# Patient Record
Sex: MALE | Race: WHITE | Employment: OTHER | URBAN - METROPOLITAN AREA
[De-identification: names, ages, dates, MRNs, and addresses within clinical notes are randomized per-mention and may not be internally consistent; named-entity substitution may affect disease eponyms.]

---

## 2017-01-09 RX ORDER — VALSARTAN 320 MG/1
TABLET ORAL
Qty: 90 TAB | Refills: 1 | Status: SHIPPED | OUTPATIENT
Start: 2017-01-09 | End: 2017-08-24 | Stop reason: SDUPTHER

## 2017-01-23 ENCOUNTER — OFFICE VISIT (OUTPATIENT)
Dept: FAMILY MEDICINE CLINIC | Age: 62
End: 2017-01-23

## 2017-01-23 VITALS
OXYGEN SATURATION: 97 % | RESPIRATION RATE: 16 BRPM | SYSTOLIC BLOOD PRESSURE: 142 MMHG | WEIGHT: 181 LBS | HEART RATE: 63 BPM | DIASTOLIC BLOOD PRESSURE: 90 MMHG | BODY MASS INDEX: 24.52 KG/M2 | TEMPERATURE: 97.9 F | HEIGHT: 72 IN

## 2017-01-23 DIAGNOSIS — R94.31 NONSPECIFIC ABNORMAL ELECTROCARDIOGRAM (ECG): ICD-10-CM

## 2017-01-23 DIAGNOSIS — R94.6 ABNORMAL THYROID FUNCTION TEST: ICD-10-CM

## 2017-01-23 DIAGNOSIS — R00.2 HEART PALPITATIONS: Primary | ICD-10-CM

## 2017-01-23 NOTE — PROGRESS NOTES
Chief Complaint   Patient presents with    Palpitations     Heart Palpitations X 4 days      1. Have you been to the ER, urgent care clinic since your last visit? Hospitalized since your last visit? No    2. Have you seen or consulted any other health care providers outside of the [address] [place] [address] since your last visit? Include any pap smears or colon screening.  No

## 2017-01-23 NOTE — PROGRESS NOTES
Subjective:      Sumit Granado is a 64 y.o. male who presents for evaluation of intermittent heart palpitations for several days. He reports symptoms are worse with laying flat. There are not accompanied with shortness of breath or chest pain. The patient reports a thumping sensation in the chest with radiating feelings up the left anterior neck and into the left ear. No associated changes in hearing, vertigo, or headache. The patient reports a similar episodes in October of 2016 with evaluation at [facility]. He was found to be in AFIB with a heart rate of 160s. He self-converted during his evaluation and subsequent stress testing and echo were normal.  He is due for follow up with Dr. Donny Carson in April of 2017. He reports mild jaw pain at this time, which was also experienced during his episodes of AFIB last year. He is active aerobically without incident. He is drinking alcohol sparingly and notes this does worsen his symptoms. He is compliant with treatment, including Metoprolol, and denies recent medication changes. Previous carotid duplex in 2011 was normal. He denies caffeine use. He is a lifetime non-smoker. Current Outpatient Prescriptions   Medication Sig Dispense Refill    valsartan (DIOVAN) 320 mg tablet TAKE 1 TABLET DAILY 90 Tab 1    aspirin delayed-release 81 mg tablet Take  by mouth daily.  metoprolol succinate (TOPROL-XL) 25 mg XL tablet Take 1 Tab by mouth daily. 90 Tab 3    dutaseride (AVODART) 0.5 mg capsule Take 0.5 mg by mouth daily. Allergies   Allergen Reactions    Ciprofloxacin Myalgia    Codeine Other (comments)     Since allergy to hydrocodone shouldn't take codeine    Hydrocodone Shortness of Breath       ROS:   Complete review of systems was reviewed with pertinent information listed in HPI.     Objective:     Visit Vitals    /90 (BP 1 Location: Left arm, BP Patient Position: Sitting)    Pulse 63    Temp 97.9 °F (36.6 °C) (Oral)    Resp 16    Ht 5' 11.5\" (1.816 m)    Wt 181 lb (82.1 kg)    SpO2 97%    BMI 24.89 kg/m2       Vitals and Nurse Documentation reviewed. General:  alert, cooperative, no distress   Ears: normal TM's and external ear canals AU   Mouth:  Lips, mucosa, and tongue normal. Teeth and gums normal   Neck: supple, symmetrical, trachea midline, no adenopathy, thyroid: not enlarged, symmetric, no tenderness/mass/nodules, no carotid bruit and no JVD. CV S1,S2. No murmur, gallop, or rub. RRR. No lower extremity edema. Lungs: clear to auscultation bilaterally       Assessment/Plan:     Cindy Rock was seen today for palpitations. Diagnoses and all orders for this visit:    Heart palpitations  -     AMB POC EKG ROUTINE W/ 12 LEADS, INTER & REP  Unclear etiology at this time. He is currently asymptomatic. Follow up with Dr. Litzy Ferraro as directed. Return for evaluation prn. ER if symptoms worsen. Continue Metoprolol as directed. Abnormal thyroid function test  -     TSH 3RD GENERATION  -     T4, FREE  -     T3 TOTAL  Repeat studies today. If normal, no additional follow up is required at this time. Nonspecific abnormal electrocardiogram (ECG)  Nonspecific changes in V3 as compared to prior ECGs. Will forward to Dr. Litzy Ferraro for evaluation. Discussed expected course/resolution/complications of diagnosis in detail with patient.    Medication risks/benefits/costs/interactions/alternatives discussed with patient.    Pt was given an after visit summary which includes diagnoses, current medications & vitals.    Pt expressed understanding with the diagnosis and plan    Follow-up Disposition:  Return if symptoms worsen or fail to improve.

## 2017-01-23 NOTE — MR AVS SNAPSHOT
Visit Information Date & Time Provider Department Dept. Phone Encounter #  
 1/23/2017  1:00 PM Naya Wood NP [facility] [phone] [id number] Follow-up Instructions Return if symptoms worsen or fail to improve. Your Appointments 4/18/2017  3:00 PM  
ESTABLISHED PATIENT with Kelvin Tobin MD  
[facility] ([facility]) Appt Note: 6 month follow up  
 [address]  
[address] [address] Upcoming Health Maintenance Date Due COLONOSCOPY 6/5/2020 DTaP/Tdap/Td series (2 - Td) 7/14/2021 Allergies as of 1/23/2017  Review Complete On: 1/23/2017 By: Naya Wood NP Severity Noted Reaction Type Reactions Ciprofloxacin Medium 10/28/2016   Systemic Myalgia Codeine  03/26/2010    Other (comments) Since allergy to hydrocodone shouldn't take codeine Hydrocodone  03/26/2010    Shortness of Breath Current Immunizations  Reviewed on 11/29/2016 Name Date Hep A Vaccine (Adult) 11/29/2016, 5/12/2016 Influenza Vaccine 10/24/2016, 10/21/2014 Influenza Vaccine Split 10/18/2010 Influenza Vaccine Whole 10/30/2012 Pneumococcal Vaccine (Unspecified Type) 10/16/2015 TDAP Vaccine 7/14/2011 Varicella Virus Vaccine 10/18/2013 Not reviewed this visit You Were Diagnosed With   
  
 Codes Comments Heart palpitations    -  Primary ICD-10-CM: R00.2 ICD-9-CM: 785.1 Abnormal thyroid function test     ICD-10-CM: R94.6 ICD-9-CM: 794.5 Nonspecific abnormal electrocardiogram (ECG)     ICD-10-CM: R94.31 
ICD-9-CM: 794.31 Vitals BP Pulse Temp Resp Height(growth percentile) Weight(growth percentile) 142/90 (BP 1 Location: Left arm, BP Patient Position: Sitting) 63 97.9 °F (36.6 °C) (Oral) 16 5' 11.5\" (1.816 m) 181 lb (82.1 kg) SpO2 BMI Smoking Status 97% 24.89 kg/m2 Never Smoker Vitals History BMI and BSA Data Body Mass Index Body Surface Area  
 24.89 kg/m 2 2.04 m 2 Preferred Pharmacy Pharmacy Name Phone [facility] [phone] Your Updated Medication List  
  
   
This list is accurate as of: 1/23/17  1:33 PM.  Always use your most recent med list.  
  
  
  
  
 aspirin delayed-release 81 mg tablet Take  by mouth daily. AVODART 0.5 mg capsule Generic drug:  dutasteride Take 0.5 mg by mouth daily. metoprolol succinate 25 mg XL tablet Commonly known as:  TOPROL-XL Take 1 Tab by mouth daily. valsartan 320 mg tablet Commonly known as:  DIOVAN  
TAKE 1 TABLET DAILY We Performed the Following AMB POC EKG ROUTINE W/ 12 LEADS, INTER & REP [83100 CPT(R)]   
 T3 TOTAL [99422 CPT(R)] T4, FREE [id number] CPT(R)] TSH 3RD GENERATION [76492 CPT(R)] Follow-up Instructions Return if symptoms worsen or fail to improve. Patient Instructions Palpitations: Care Instructions Your Care Instructions Heart palpitations are the uncomfortable sensation that your heart is beating fast or irregularly. You might feel pounding or fluttering in your chest. It might feel like your heart is skipping a beat. Although palpitations may be caused by a heart problem, they also occur because of stress, fatigue, or use of alcohol, caffeine, or nicotine. Many medicines, including diet pills, antihistamines, decongestants, and some herbal products, can cause heart palpitations. Nearly everyone has palpitations from time to time. Depending on your symptoms, your doctor may need to do more tests to try to find the cause of your palpitations. Follow-up care is a key part of your treatment and safety.  Be sure to make and go to all appointments, and call your doctor if you are having problems. It's also a good idea to know your test results and keep a list of the medicines you take. How can you care for yourself at home? · Avoid caffeine, nicotine, and excess alcohol. · Do not take illegal drugs, such as methamphetamines and cocaine. · Do not take weight loss or diet medicines unless you talk with your doctor first. 
· Get plenty of sleep. · Do not overeat. · If you have palpitations again, take deep breaths and try to relax. This may slow a racing heart. · If you start to feel lightheaded, lie down to avoid injuries that might result if you pass out and fall down. · Keep a record of your palpitations and bring it to your next doctor's appointment. Write down: ¨ The date and time. ¨ Your pulse. (If your heart is beating fast, it may be hard to count your pulse.) ¨ What you were doing when the palpitations started. ¨ How long the palpitations lasted. ¨ Any other symptoms. · If an activity causes palpitations, slow down or stop. Talk to your doctor before you do that activity again. · Take your medicines exactly as prescribed. Call your doctor if you think you are having a problem with your medicine. When should you call for help? Call 911 anytime you think you may need emergency care. For example, call if: 
· You passed out (lost consciousness). · You have symptoms of a heart attack. These may include: ¨ Chest pain or pressure, or a strange feeling in the chest. 
¨ Sweating. ¨ Shortness of breath. ¨ Pain, pressure, or a strange feeling in the back, neck, jaw, or upper belly or in one or both shoulders or arms. ¨ Lightheadedness or sudden weakness. ¨ A fast or irregular heartbeat. After you call 911, the  may tell you to chew 1 adult-strength or 2 to 4 low-dose aspirin. Wait for an ambulance. Do not try to drive yourself. · You have symptoms of a stroke.  These may include: 
¨ Sudden numbness, tingling, weakness, or loss of movement in your face, arm, or leg, especially on only one side of your body. ¨ Sudden vision changes. ¨ Sudden trouble speaking. ¨ Sudden confusion or trouble understanding simple statements. ¨ Sudden problems with walking or balance. ¨ A sudden, severe headache that is different from past headaches. Call your doctor now or seek immediate medical care if: 
· You have heart palpitations and: ¨ Are dizzy or lightheaded, or you feel like you may faint. ¨ Have new or increased shortness of breath. Watch closely for changes in your health, and be sure to contact your doctor if: 
· You continue to have heart palpitations. Where can you learn more? Go to http://bambi-krishna.info/. Enter R508 in the search box to learn more about \"Palpitations: Care Instructions. \" Current as of: January 27, 2016 Content Version: 11.1 © 5151-6814 [employer]. Care instructions adapted under license by [employer] (which disclaims liability or warranty for this information). If you have questions about a medical condition or this instruction, always ask your healthcare professional. [address] any warranty or liability for your use of this information. Introducing [facility] & HEALTH SERVICES! Dear Elicia Saxena: Thank you for requesting a [employer] account. Our records indicate that you already have an active [employer] account. You can access your account anytime at [email or web address]. Did you know that you can access your hospital and ER discharge instructions at any time in [employer]? You can also review all of your test results from your hospital stay or ER visit. Additional Information If you have questions, please visit the Frequently Asked Questions section of the [employer] website at [email or web address]. Remember, [employer] is NOT to be used for urgent needs. For medical emergencies, dial 911. Now available from your iPhone and Android! Please provide this summary of care documentation to your next provider. Your primary care clinician is listed as Camille Maxwell. If you have any questions after today's visit, please call [phone].

## 2017-01-23 NOTE — PATIENT INSTRUCTIONS
Palpitations: Care Instructions  Your Care Instructions    Heart palpitations are the uncomfortable sensation that your heart is beating fast or irregularly. You might feel pounding or fluttering in your chest. It might feel like your heart is skipping a beat. Although palpitations may be caused by a heart problem, they also occur because of stress, fatigue, or use of alcohol, caffeine, or nicotine. Many medicines, including diet pills, antihistamines, decongestants, and some herbal products, can cause heart palpitations. Nearly everyone has palpitations from time to time. Depending on your symptoms, your doctor may need to do more tests to try to find the cause of your palpitations. Follow-up care is a key part of your treatment and safety. Be sure to make and go to all appointments, and call your doctor if you are having problems. It's also a good idea to know your test results and keep a list of the medicines you take. How can you care for yourself at home? · Avoid caffeine, nicotine, and excess alcohol. · Do not take illegal drugs, such as methamphetamines and cocaine. · Do not take weight loss or diet medicines unless you talk with your doctor first.  · Get plenty of sleep. · Do not overeat. · If you have palpitations again, take deep breaths and try to relax. This may slow a racing heart. · If you start to feel lightheaded, lie down to avoid injuries that might result if you pass out and fall down. · Keep a record of your palpitations and bring it to your next doctor's appointment. Write down:  ¨ The date and time. ¨ Your pulse. (If your heart is beating fast, it may be hard to count your pulse.)  ¨ What you were doing when the palpitations started. ¨ How long the palpitations lasted. ¨ Any other symptoms. · If an activity causes palpitations, slow down or stop. Talk to your doctor before you do that activity again. · Take your medicines exactly as prescribed.  Call your doctor if you think you are having a problem with your medicine. When should you call for help? Call 911 anytime you think you may need emergency care. For example, call if:  · You passed out (lost consciousness). · You have symptoms of a heart attack. These may include:  ¨ Chest pain or pressure, or a strange feeling in the chest.  ¨ Sweating. ¨ Shortness of breath. ¨ Pain, pressure, or a strange feeling in the back, neck, jaw, or upper belly or in one or both shoulders or arms. ¨ Lightheadedness or sudden weakness. ¨ A fast or irregular heartbeat. After you call 911, the  may tell you to chew 1 adult-strength or 2 to 4 low-dose aspirin. Wait for an ambulance. Do not try to drive yourself. · You have symptoms of a stroke. These may include:  ¨ Sudden numbness, tingling, weakness, or loss of movement in your face, arm, or leg, especially on only one side of your body. ¨ Sudden vision changes. ¨ Sudden trouble speaking. ¨ Sudden confusion or trouble understanding simple statements. ¨ Sudden problems with walking or balance. ¨ A sudden, severe headache that is different from past headaches. Call your doctor now or seek immediate medical care if:  · You have heart palpitations and:  ¨ Are dizzy or lightheaded, or you feel like you may faint. ¨ Have new or increased shortness of breath. Watch closely for changes in your health, and be sure to contact your doctor if:  · You continue to have heart palpitations. Where can you learn more? Go to http://bambi-krishna.info/. Enter R508 in the search box to learn more about \"Palpitations: Care Instructions. \"  Current as of: January 27, 2016  Content Version: 11.1  © 1579-0702 [employer]. Care instructions adapted under license by [employer] (which disclaims liability or warranty for this information).  If you have questions about a medical condition or this instruction, always ask your healthcare professional. [employer] disclaims any warranty or liability for your use of this information.

## 2017-01-24 ENCOUNTER — TELEPHONE (OUTPATIENT)
Dept: FAMILY MEDICINE CLINIC | Age: 62
End: 2017-01-24

## 2017-01-24 LAB
T3 SERPL-MCNC: 102 NG/DL (ref 71–180)
T4 FREE SERPL-MCNC: 1.19 NG/DL (ref 0.82–1.77)
TSH SERPL DL<=0.005 MIU/L-ACNC: 4.12 UIU/ML (ref 0.45–4.5)

## 2017-01-24 NOTE — TELEPHONE ENCOUNTER
Let patient know Dr. Wilburt Bence reviewed EKG and feels the change was in relation to the cord placement difference from one EKG to the other (so basically it was normal and no change from prior EKG). He is happy to wait until April to see patient.

## 2017-01-26 NOTE — TELEPHONE ENCOUNTER
Patient was contacted and notified of note below. Patient stated understanding and will contact the office if needed.

## 2017-04-18 ENCOUNTER — OFFICE VISIT (OUTPATIENT)
Dept: CARDIOLOGY CLINIC | Age: 62
End: 2017-04-18

## 2017-04-18 VITALS
HEART RATE: 60 BPM | SYSTOLIC BLOOD PRESSURE: 100 MMHG | HEIGHT: 72 IN | BODY MASS INDEX: 24.38 KG/M2 | RESPIRATION RATE: 16 BRPM | OXYGEN SATURATION: 99 % | DIASTOLIC BLOOD PRESSURE: 60 MMHG | WEIGHT: 180 LBS

## 2017-04-18 DIAGNOSIS — E78.2 MIXED HYPERLIPIDEMIA: ICD-10-CM

## 2017-04-18 DIAGNOSIS — I10 BENIGN ESSENTIAL HTN: ICD-10-CM

## 2017-04-18 DIAGNOSIS — I48.0 PAROXYSMAL A-FIB (HCC): Primary | ICD-10-CM

## 2017-04-18 NOTE — PROGRESS NOTES
[place] [place]: Camila Adams  [phone]    History of Present Illness:   Mr. Divina Swift is a 65 yo M with hypertension, borderline hyperlipidemia seen in past for an abnormal EKG with Q waves in V1 and V2 with question of old anteroseptal infarct. Echo 2015 was normal with no infarct. Since his last visit, he has been doing well. No significant palpitations, chest pain or shortness of breath. He had a stress test in 10/2016 that was normal.  He has been compliant with his medications. He is compensated on exam with clear lungs and no lower extremity edema. He is in a normal rhythm here. Soc hx. No tobacco.  Fam hx. No early CAD. Assessment and Plan:   1. Paroxysmal atrial fibrillation. Well controlled on beta-blocker. If needed, could always titrate up his Toprol and decrease his Diovan. His echocardiogram and stress test last year were normal.  He can follow back here in one year. 2. Essential hypertension. Blood pressure is controlled and no changes made. 3. Chronic anticoagulation. His CHADS VASC score is low and for now would just continue aspirin. As he gets closer to 71 yo, would consider anticoagulation. 4. Mixed hyperlipidemia. Working on lifestyle modification. He  has a past medical history of Acoustic neuroma ([address] (2012); Colonic polyps (2006); Diverticulosis (2006); HTN, goal below 150/90; Hypertrophy of prostate without urinary obstruction and other lower urinary tract symptoms (LUTS); IBS (irritable bowel syndrome); Lumbar stenosis; Other and unspecified hyperlipidemia (7/11); Paroxysmal atrial fibrillation ([address] and Unstable angina ([address] All other systems negative except as above. PE  Vitals:    04/18/17 1513   BP: 100/60   Pulse: 60   Resp: 16   SpO2: 99%   Weight: 180 lb (81.6 kg)   Height: 5' 11.5\" (1.816 m)    Body mass index is 24.76 kg/(m^2).    General appearance - alert, well appearing, and in no distress  Mental status - affect appropriate to mood  Eyes - sclera anicteric, moist mucous membranes  Neck - supple, no significant adenopathy, no JVD  Chest - clear to auscultation, no wheezes, rales or rhonchi  Heart - normal rate, regular rhythm, normal S1, S2, no murmurs, rubs, clicks or gallops  Abdomen - soft, nontender, nondistended, no masses or organomegaly  Neurological - no focal deficit  Extremities - peripheral pulses normal, no pedal edema      Recent Labs:  Lab Results   Component Value Date/Time    Cholesterol, total 189 10/10/2016 08:19 AM    HDL Cholesterol 58 10/10/2016 08:19 AM    LDL, calculated 103 10/10/2016 08:19 AM    Triglyceride 141 10/10/2016 08:19 AM    CHOL/HDL Ratio 3.6 03/29/2010 10:01 AM     Lab Results   Component Value Date/Time    Creatinine (POC) 0.9 03/24/2012 09:20 AM    Creatinine 0.86 10/10/2016 08:19 AM     Lab Results   Component Value Date/Time    BUN 17 10/10/2016 08:19 AM     Lab Results   Component Value Date/Time    Potassium 4.6 10/10/2016 08:19 AM     Lab Results   Component Value Date/Time    Hemoglobin A1c 5.5 10/10/2016 08:19 AM     Lab Results   Component Value Date/Time    HGB 16.7 10/10/2016 08:19 AM     Lab Results   Component Value Date/Time    PLATELET 577 82/98/3558 08:19 AM       Reviewed:  Past Medical History:   Diagnosis Date    Acoustic [address]a [address] 2012    R  Marlon/ns    Colonic polyps 2006    Osorio/gi    Diverticulosis 2006    HTN, goal below 150/90     Hypertrophy of prostate without urinary obstruction and other lower urinary tract symptoms (LUTS)     Dorado/uro    IBS (irritable bowel syndrome)     Lumbar stenosis     severe  Van/os    Other and unspecified hyperlipidemia 7/11    Paroxysmal atrial fibrillation (HCC)     ASA for anticoagulation. Beta blocker.     Unstable angina (HCC)     negative stress test 10/2016     History   Smoking Status    Never Smoker   Smokeless Tobacco    Never Used     History   Alcohol Use    0.0 oz/week    0 Standard drinks or equivalent per week Comment: occasional     Allergies   Allergen Reactions    Ciprofloxacin Myalgia    Codeine Other (comments)     Since allergy to hydrocodone shouldn't take codeine    Hydrocodone Shortness of Breath       Current Outpatient Prescriptions   Medication Sig    valsartan (DIOVAN) 320 mg tablet TAKE 1 TABLET DAILY    aspirin delayed-release 81 mg tablet Take  by mouth daily.  metoprolol succinate (TOPROL-XL) 25 mg XL tablet Take 1 Tab by mouth daily.  dutaseride (AVODART) 0.5 mg capsule Take 0.5 mg by mouth daily. No current facility-administered medications for this visit.         Maria Del Carmen Gan MD  [facility] [place]  [address] [facility]  [facility], [address]

## 2017-04-18 NOTE — MR AVS SNAPSHOT
Visit Information Date & Time Provider Department Dept. Phone Encounter #  
 4/18/2017  3:00 PM Javier Garcia MD [facility]enni Corey [phone] [id number] Your Appointments 4/17/2018  3:00 PM  
ESTABLISHED PATIENT with Javier Garcia MD  
[facility] ([facility]) Appt Note: 1 yr f/u  
 [address] [facility] [address] [place] [address]  
[address] [facility] [address] [address] Upcoming Health Maintenance Date Due COLONOSCOPY 6/5/2020 DTaP/Tdap/Td series (2 - Td) 7/14/2021 Allergies as of 4/18/2017  Review Complete On: 4/18/2017 By: Javier Garcia MD  
  
 Severity Noted Reaction Type Reactions Ciprofloxacin Medium 10/28/2016   Systemic Myalgia Codeine  03/26/2010    Other (comments) Since allergy to hydrocodone shouldn't take codeine Hydrocodone  03/26/2010    Shortness of Breath Current Immunizations  Reviewed on 11/29/2016 Name Date Hep A Vaccine (Adult) 11/29/2016, 5/12/2016 Influenza Vaccine 10/24/2016, 10/21/2014 Influenza Vaccine Split 10/18/2010 Influenza Vaccine Whole 10/30/2012 Pneumococcal Vaccine (Unspecified Type) 10/16/2015 TDAP Vaccine 7/14/2011 Varicella Virus Vaccine 10/18/2013 Not reviewed this visit Vitals BP Pulse Resp Height(growth percentile) Weight(growth percentile) SpO2  
 100/60 (BP 1 Location: Left arm, BP Patient Position: Sitting) 60 16 5' 11.5\" (1.816 m) 180 lb (81.6 kg) 99% BMI Smoking Status 24.76 kg/m2 Never Smoker BMI and BSA Data Body Mass Index Body Surface Area 24.76 kg/m 2 2.03 m 2 Preferred Pharmacy Pharmacy Name Phone [address] [facility] [phone] Your Updated Medication List  
  
   
This list is accurate as of: 4/18/17  3:46 PM.  Always use your most recent med list.  
  
  
  
  
 aspirin delayed-release 81 mg tablet Take  by mouth daily. AVODART 0.5 mg capsule Generic drug:  dutasteride Take 0.5 mg by mouth daily. metoprolol succinate 25 mg XL tablet Commonly known as:  TOPROL-XL Take 1 Tab by mouth daily. valsartan 320 mg tablet Commonly known as:  DIOVAN  
TAKE 1 TABLET DAILY [facility] SERVICES! Dear Raquel Rae: Thank you for requesting a [employer] account. Our records indicate that you already have an active [employer] account. You can access your account anytime at [email or web address] Did you know that you can access your hospital and ER discharge instructions at any time in [employer]? You can also review all of your test results from your hospital stay or ER visit. Additional Information If you have questions, please visit the Frequently Asked Questions section of the [employer] website at [email or web address] Remember, [employer] is NOT to be used for urgent needs. For medical emergencies, dial 911. Now available from your iPhone and Android! Please provide this summary of care documentation to your next provider. Your primary care clinician is listed as Camille Maxwell. If you have any questions after today's visit, please call [phone].

## 2017-04-19 PROBLEM — I48.0 PAROXYSMAL A-FIB (HCC): Status: ACTIVE | Noted: 2017-04-19

## 2017-04-19 PROBLEM — E78.2 MIXED HYPERLIPIDEMIA: Status: ACTIVE | Noted: 2017-04-19

## 2017-04-19 PROBLEM — I10 BENIGN ESSENTIAL HTN: Status: ACTIVE | Noted: 2017-04-19

## 2017-08-24 RX ORDER — VALSARTAN 320 MG/1
TABLET ORAL
Qty: 90 TAB | Refills: 0 | Status: SHIPPED | OUTPATIENT
Start: 2017-08-24 | End: 2017-11-22 | Stop reason: SDUPTHER

## 2017-10-17 RX ORDER — METOPROLOL SUCCINATE 25 MG/1
TABLET, EXTENDED RELEASE ORAL
Qty: 90 TAB | Refills: 3 | Status: SHIPPED | OUTPATIENT
Start: 2017-10-17 | End: 2018-10-14 | Stop reason: SDUPTHER

## 2017-11-22 RX ORDER — VALSARTAN 320 MG/1
TABLET ORAL
Qty: 90 TAB | Refills: 0 | Status: SHIPPED | OUTPATIENT
Start: 2017-11-22 | End: 2018-02-22 | Stop reason: SDUPTHER

## 2017-12-18 ENCOUNTER — TELEPHONE (OUTPATIENT)
Dept: FAMILY MEDICINE CLINIC | Age: 62
End: 2017-12-18

## 2017-12-18 DIAGNOSIS — E78.2 MIXED HYPERLIPIDEMIA: Primary | ICD-10-CM

## 2017-12-18 DIAGNOSIS — Z12.5 SCREENING FOR PROSTATE CANCER: ICD-10-CM

## 2017-12-18 DIAGNOSIS — Z13.1 SCREENING FOR DIABETES MELLITUS: ICD-10-CM

## 2017-12-18 NOTE — TELEPHONE ENCOUNTER
Demond [place] [phone]        Patient scheduled for CPE  2-5-2018 -  Requesting lab work prior to visit-  Contact patient when lab order is placed

## 2018-01-06 LAB
ALBUMIN SERPL-MCNC: 4.4 G/DL (ref 3.6–4.8)
ALBUMIN/GLOB SERPL: 1.9 {RATIO} (ref 1.2–2.2)
ALP SERPL-CCNC: 87 IU/L (ref 39–117)
ALT SERPL-CCNC: 29 IU/L (ref 0–44)
AST SERPL-CCNC: 19 IU/L (ref 0–40)
BILIRUB SERPL-MCNC: 0.7 MG/DL (ref 0–1.2)
BUN SERPL-MCNC: 26 MG/DL (ref 8–27)
BUN/CREAT SERPL: 26 (ref 10–24)
CALCIUM SERPL-MCNC: 9.6 MG/DL (ref 8.6–10.2)
CHLORIDE SERPL-SCNC: 102 MMOL/L (ref 96–106)
CHOLEST SERPL-MCNC: 196 MG/DL (ref 100–199)
CO2 SERPL-SCNC: 24 MMOL/L (ref 18–29)
CREAT SERPL-MCNC: 1.01 MG/DL (ref 0.76–1.27)
EST. AVERAGE GLUCOSE BLD GHB EST-MCNC: 108 MG/DL
GLOBULIN SER CALC-MCNC: 2.3 G/DL (ref 1.5–4.5)
GLUCOSE SERPL-MCNC: 97 MG/DL (ref 65–99)
HBA1C MFR BLD: 5.4 % (ref 4.8–5.6)
HDLC SERPL-MCNC: 46 MG/DL
INTERPRETATION, 910389: NORMAL
LDLC SERPL CALC-MCNC: 123 MG/DL (ref 0–99)
POTASSIUM SERPL-SCNC: 4.9 MMOL/L (ref 3.5–5.2)
PROT SERPL-MCNC: 6.7 G/DL (ref 6–8.5)
PSA SERPL-MCNC: 2.7 NG/ML (ref 0–4)
SODIUM SERPL-SCNC: 139 MMOL/L (ref 134–144)
TRIGL SERPL-MCNC: 134 MG/DL (ref 0–149)
VLDLC SERPL CALC-MCNC: 27 MG/DL (ref 5–40)

## 2018-02-05 ENCOUNTER — OFFICE VISIT (OUTPATIENT)
Dept: FAMILY MEDICINE CLINIC | Age: 63
End: 2018-02-05

## 2018-02-05 VITALS
SYSTOLIC BLOOD PRESSURE: 114 MMHG | RESPIRATION RATE: 18 BRPM | HEIGHT: 71 IN | WEIGHT: 180 LBS | OXYGEN SATURATION: 96 % | DIASTOLIC BLOOD PRESSURE: 68 MMHG | HEART RATE: 61 BPM | BODY MASS INDEX: 25.2 KG/M2 | TEMPERATURE: 97.9 F

## 2018-02-05 DIAGNOSIS — I48.0 PAROXYSMAL A-FIB (HCC): ICD-10-CM

## 2018-02-05 DIAGNOSIS — F41.8 SITUATIONAL ANXIETY: ICD-10-CM

## 2018-02-05 DIAGNOSIS — Z00.00 ROUTINE GENERAL MEDICAL EXAMINATION AT HEALTH CARE FACILITY: Primary | ICD-10-CM

## 2018-02-05 DIAGNOSIS — E78.2 MIXED HYPERLIPIDEMIA: ICD-10-CM

## 2018-02-05 RX ORDER — ALPRAZOLAM 0.25 MG/1
TABLET ORAL
Qty: 5 TAB | Refills: 0 | Status: SHIPPED | OUTPATIENT
Start: 2018-02-05 | End: 2019-04-25 | Stop reason: SDUPTHER

## 2018-02-05 NOTE — PROGRESS NOTES
Chief Complaint   Patient presents with    Complete Physical     1. Have you been to the ER, urgent care clinic since your last visit? Hospitalized since your last visit? No    2. Have you seen or consulted any other health care providers outside of the [address] [place] [address] since your last visit? Include any pap smears or colon screening.  No

## 2018-02-05 NOTE — ASSESSMENT & PLAN NOTE
This condition is managed by Specialist.  Key CAD CHF Meds             valsartan (DIOVAN) 320 mg tablet  (Taking) TAKE 1 TABLET DAILY    metoprolol succinate (TOPROL-XL) 25 mg XL tablet  (Taking) TAKE 1 TABLET DAILY    aspirin delayed-release 81 mg tablet  (Taking) Take 81 mg by mouth daily. Key Antihyperlipidemia Meds     The patient is on no antihyperlipidemia meds.         Lab Results   Component Value Date/Time    Sodium 139 01/05/2018 08:26 AM    Potassium 4.9 01/05/2018 08:26 AM    Cholesterol, total 196 01/05/2018 08:26 AM    HDL Cholesterol 46 01/05/2018 08:26 AM    LDL, calculated 123 01/05/2018 08:26 AM    Triglyceride 134 01/05/2018 08:26 AM

## 2018-02-05 NOTE — PATIENT INSTRUCTIONS

## 2018-02-05 NOTE — PROGRESS NOTES
Patient Name: Asha Crandall. MRN: [id number]    Silvia Guerin is a 58 y.o. male who presents with the following:     Colon Cancer Screening: not up to date - pt will contact GI. Hep C: declined  PSA:  Lab Results   Component Value Date/Time    Prostate Specific Ag 2.7 01/05/2018 08:26 AM    Prostate Specific Ag 2.5 10/10/2016 08:19 AM    Prostate Specific Ag 2.3 08/17/2015 10:14 AM     CAD risk factors:  HTN: on meds  Lipid: not on statin; pt would like to discuss with his cardiology re: starting statin. Lab Results   Component Value Date/Time    Cholesterol, total 196 01/05/2018 08:26 AM    HDL Cholesterol 46 01/05/2018 08:26 AM    LDL, calculated 123 01/05/2018 08:26 AM    VLDL, calculated 27 01/05/2018 08:26 AM    Triglyceride 134 01/05/2018 08:26 AM    CHOL/HDL Ratio 3.6 03/29/2010 10:01 AM     DM: none  Lab Results   Component Value Date/Time    Hemoglobin A1c 5.4 01/05/2018 08:26 AM     Requests medication for mild flight anxiety/sleep aid. Has upcoming international flight in June to \[facility]\"" and would like to try a short acting medication to help sleep on plane. Has not tried anything in the past.  is appropriate. Review of Systems   Constitutional: Negative for fever, malaise/fatigue and weight loss. Respiratory: Negative for cough, hemoptysis, shortness of breath and wheezing. Cardiovascular: Negative for chest pain, palpitations, leg swelling and PND. Gastrointestinal: Negative for abdominal pain, constipation, diarrhea, nausea and vomiting. Psychiatric/Behavioral: Negative for depression and suicidal ideas. The patient is nervous/anxious. The patient does not have insomnia. The patient's medications, allergies, past medical history, surgical history, family history and social history were reviewed and updated where appropriate. Prior to Admission medications    Medication Sig Start Date End Date Taking?  Authorizing Provider   valsartan (DIOVAN) 320 mg tablet TAKE 1 TABLET DAILY 11/22/17  Yes Jabier Cortes MD   metoprolol succinate (TOPROL-XL) 25 mg XL tablet TAKE 1 TABLET DAILY 10/17/17  Yes Jabier Cortes MD   aspirin delayed-release 81 mg tablet Take 81 mg by mouth daily. Yes Historical Provider   dutaseride (AVODART) 0.5 mg capsule Take 0.5 mg by mouth daily. Yes Historical Provider       Allergies   Allergen Reactions    Ciprofloxacin Myalgia    Codeine Other (comments)     Since allergy to hydrocodone shouldn't take codeine    Hydrocodone Shortness of Breath           OBJECTIVE    Visit Vitals    /68 (BP 1 Location: Left arm, BP Patient Position: Sitting)    Pulse 61    Temp 97.9 °F (36.6 °C) (Oral)    Resp 18    Ht 5' 11\" (1.803 m)    Wt 180 lb (81.6 kg)    SpO2 96%    BMI 25.1 kg/m2       Physical Exam   Constitutional: He is well-developed, well-nourished, and in no distress. No distress. Eyes: Conjunctivae and EOM are normal. Pupils are equal, round, and reactive to light. Cardiovascular: Normal rate, regular rhythm and normal heart sounds. Exam reveals no gallop and no friction rub. No murmur heard. Pulmonary/Chest: Effort normal and breath sounds normal. No respiratory distress. He has no wheezes. Skin: He is not diaphoretic. Psychiatric: Mood, memory, affect and judgment normal.   Nursing note and vitals reviewed. ASSESSMENT AND PLAN  Lisa Artis is a 58 y.o. male who presents today for:    1. Routine general medical examination at health care facility  Reviewed age appropriate screening tests as recommended by the [place]PSTF Preventive Services Database with patient today. 2. Mixed hyperlipidemia  Given pt's 10-year risk for incident ASCVD risk score and other risk factors, pt would benefit from a moderate-intensity statin. Pt to discuss with his cardiology prior to statin initiation. I have reviewed/discussed the above normal BMI with the patient.   I have recommended the following interventions: dietary management education, guidance, and counseling, encourage exercise, monitor weight and prescribed dietary intake. 3. Situational anxiety   appropriate. Will trial short acting benzodiazepines; advised not to take with alcohol or before driving.  - ALPRAZolam (XANAX) 0.25 mg tablet; Take 1 tab by mouth once before flight for anxiety  Dispense: 5 Tab; Refill: 0     4. Paroxysmal a-fib (HCC)  Stable, continue current treatment. There are no discontinued medications. Follow-up Disposition:  Return in about 1 year (around 2/5/2019) for CPE (30 min). Medication risks/benefits/costs/interactions/alternatives discussed with patient. Advised patient to call back or return to office if symptoms worsen/change/persist. If patient cannot reach us or should anything more severe/urgent arise he/she should proceed directly to the nearest emergency department. Discussed expected course/resolution/complications of diagnosis in detail with patient. Patient given a written after visit summary which includes his/her diagnoses, current medications and vitals. Patient expressed understanding with the diagnosis and plan. Hezekiah Claude M.D. Diagnoses and all orders for this visit:    1. Routine general medical examination at health care facility    2. Mixed hyperlipidemia    3. Situational anxiety  -     ALPRAZolam (XANAX) 0.25 mg tablet; Take 1 tab by mouth once before flight for anxiety    4. Paroxysmal a-fib [facility])  Assessment & Plan: This condition is managed by Specialist.  Key CAD CHF Meds             valsartan (DIOVAN) 320 mg tablet  (Taking) TAKE 1 TABLET DAILY    metoprolol succinate (TOPROL-XL) 25 mg XL tablet  (Taking) TAKE 1 TABLET DAILY    aspirin delayed-release 81 mg tablet  (Taking) Take 81 mg by mouth daily. Key Antihyperlipidemia Meds     The patient is on no antihyperlipidemia meds.         Lab Results   Component Value Date/Time    Sodium 139 01/05/2018 08:26 AM    Potassium 4.9 01/05/2018 08:26 AM    Cholesterol, total 196 01/05/2018 08:26 AM    HDL Cholesterol 46 01/05/2018 08:26 AM    LDL, calculated 123 01/05/2018 08:26 AM    Triglyceride 134 01/05/2018 08:26 AM

## 2018-02-05 NOTE — MR AVS SNAPSHOT
[facility] 
 
 
 [address] 
[phone] Patient: Renard Omalley. MRN: [id number] SYS:[id number] Visit Information Date & Time Provider Department Dept. Phone Encounter #  
 2/5/2018  2:30 PM Anya Argueta  [facility] [phone] [id number] Follow-up Instructions Return in about 1 year (around 2/5/2019) for CPE (30 min). Your Appointments 4/17/2018  3:00 PM  
ESTABLISHED PATIENT with Chris Luevano MD  
[facility] Appt Note: 1 yr f/u  
 [address] [place] [address]  
[address] [address] Upcoming Health Maintenance Date Due COLONOSCOPY 6/5/2020 DTaP/Tdap/Td series (2 - Td) 7/14/2021 Allergies as of 2/5/2018  Review Complete On: 2/5/2018 By: Genesis Hudson Severity Noted Reaction Type Reactions Ciprofloxacin Medium 10/28/2016   Systemic Myalgia Codeine  03/26/2010    Other (comments) Since allergy to hydrocodone shouldn't take codeine Hydrocodone  03/26/2010    Shortness of Breath Current Immunizations  Reviewed on 11/29/2016 Name Date Hep A Vaccine (Adult) 11/29/2016, 5/12/2016 Influenza Vaccine 10/23/2017, 10/24/2016, 10/21/2014 Influenza Vaccine Split 10/18/2010 Influenza Vaccine Whole 10/30/2012 Pneumococcal Vaccine (Unspecified Type) 10/16/2015 TDAP Vaccine 7/14/2011 Varicella Virus Vaccine 10/18/2013 Not reviewed this visit You Were Diagnosed With   
  
 Codes Comments Routine general medical examination at health care facility    -  Primary ICD-10-CM: Z00.00 ICD-9-CM: V70.0 Vitals BP Pulse Temp Resp Height(growth percentile) Weight(growth percentile) 114/68 (BP 1 Location: Left arm, BP Patient Position: Sitting) 61 97.9 °F (36.6 °C) (Oral) 18 5' 11\" (1.803 m) 180 lb (81.6 kg) SpO2 BMI Smoking Status 96% 25.1 kg/m2 Never Smoker BMI and BSA Data Body Mass Index Body Surface Area  
 25.1 kg/m 2 2.02 m 2 Preferred Pharmacy Pharmacy Name Phone [address] [facility] [phone] Your Updated Medication List  
  
   
This list is accurate as of: 2/5/18  3:40 PM.  Always use your most recent med list.  
  
  
  
  
 aspirin delayed-release 81 mg tablet Take 81 mg by mouth daily. AVODART 0.5 mg capsule Generic drug:  dutasteride Take 0.5 mg by mouth daily. metoprolol succinate 25 mg XL tablet Commonly known as:  TOPROL-XL  
TAKE 1 TABLET DAILY  
  
 valsartan 320 mg tablet Commonly known as:  DIOVAN  
TAKE 1 TABLET DAILY Follow-up Instructions Return in about 1 year (around 2/5/2019) for CPE (30 min). Patient Instructions Well Visit, Ages 25 to 48: Care Instructions Your Care Instructions Physical exams can help you stay healthy. Your doctor has checked your overall health and may have suggested ways to take good care of yourself. He or she also may have recommended tests. At home, you can help prevent illness with healthy eating, regular exercise, and other steps. Follow-up care is a key part of your treatment and safety. Be sure to make and go to all appointments, and call your doctor if you are having problems. It's also a good idea to know your test results and keep a list of the medicines you take. How can you care for yourself at home? · Reach and stay at a healthy weight. This will lower your risk for many problems, such as obesity, diabetes, heart disease, and high blood pressure. · Get at least 30 minutes of physical activity on most days of the week. Walking is a good choice. You also may want to do other activities, such as running, swimming, cycling, or playing tennis or team sports.  Discuss any changes in your exercise program with your doctor. · Do not smoke or allow others to smoke around you. If you need help quitting, talk to your doctor about stop-smoking programs and medicines. These can increase your chances of quitting for good. · Talk to your doctor about whether you have any risk factors for sexually transmitted infections (STIs). Having one sex partner (who does not have STIs and does not have sex with anyone else) is a good way to avoid these infections. · Use birth control if you do not want to have children at this time. Talk with your doctor about the choices available and what might be best for you. · Protect your skin from too much sun. When you're outdoors from 10 a.m. to 4 p.m., stay in the shade or cover up with clothing and a hat with a wide brim. Wear sunglasses that block UV rays. Even when it's cloudy, put broad-spectrum sunscreen (SPF 30 or higher) on any exposed skin. · See a dentist one or two times a year for checkups and to have your teeth cleaned. · Wear a seat belt in the car. · Drink alcohol in moderation, if at all. That means no more than 2 drinks a day for men and 1 drink a day for women. Follow your doctor's advice about when to have certain tests. These tests can spot problems early. For everyone · Cholesterol. Have the fat (cholesterol) in your blood tested after age 21. Your doctor will tell you how often to have this done based on your age, family history, or other things that can increase your risk for heart disease. · Blood pressure. Have your blood pressure checked during a routine doctor visit. Your doctor will tell you how often to check your blood pressure based on your age, your blood pressure results, and other factors. · Vision. Talk with your doctor about how often to have a glaucoma test. 
· Diabetes. Ask your doctor whether you should have tests for diabetes. · Colon cancer. Have a test for colon cancer at age 48.  You may have one of several tests. If you are younger than 48, you may need a test earlier if you have any risk factors. Risk factors include whether you already had a precancerous polyp removed from your colon or whether your parent, brother, sister, or child has had colon cancer. For women · Breast exam and mammogram. Talk to your doctor about when you should have a clinical breast exam and a mammogram. Medical experts differ on whether and how often women under 50 should have these tests. Your doctor can help you decide what is right for you. · Pap test and pelvic exam. Begin Pap tests at age 24. A Pap test is the best way to find cervical cancer. The test often is part of a pelvic exam. Ask how often to have this test. 
· Tests for sexually transmitted infections (STIs). Ask whether you should have tests for STIs. You may be at risk if you have sex with more than one person, especially if your partners do not wear condoms. For men · Tests for sexually transmitted infections (STIs). Ask whether you should have tests for STIs. You may be at risk if you have sex with more than one person, especially if you do not wear a condom. · Testicular cancer exam. Ask your doctor whether you should check your testicles regularly. · Prostate exam. Talk to your doctor about whether you should have a blood test (called a PSA test) for prostate cancer. Experts differ on whether and when men should have this test. Some experts suggest it if you are older than 39 and are -American or have a father or brother who got prostate cancer when he was younger than 72. When should you call for help? Watch closely for changes in your health, and be sure to contact your doctor if you have any problems or symptoms that concern you. Where can you learn more? Go to http://bambi-krishna.info/. Enter P072 in the search box to learn more about \"Well Visit, Ages 25 to 48: Care Instructions. \" Current as of: May 12, 2017 Content Version: 11.4 © 3271-1750 Healthwise, [employer]. Care instructions adapted under license by [employer] (which disclaims liability or warranty for this information). If you have questions about a medical condition or this instruction, always ask your healthcare professional. [address] any warranty or liability for your use of this information. Introducing [facility]! Dear Torsten Kaufman: Thank you for requesting a [employer] account. Our records indicate that you already have an active [employer] account. You can access your account anytime at [email or web address] Did you know that you can access your hospital and ER discharge instructions at any time in [employer]? You can also review all of your test results from your hospital stay or ER visit. Additional Information If you have questions, please visit the Frequently Asked Questions section of the [employer] website at [email or web address] Remember, [employer] is NOT to be used for urgent needs. For medical emergencies, dial 911. Now available from your iPhone and Android! Please provide this summary of care documentation to your next provider. Your primary care clinician is listed as Camille Maxwell. If you have any questions after today's visit, please call [phone].

## 2018-02-22 RX ORDER — VALSARTAN 320 MG/1
TABLET ORAL
Qty: 90 TAB | Refills: 0 | Status: SHIPPED | OUTPATIENT
Start: 2018-02-22 | End: 2018-05-21 | Stop reason: SDUPTHER

## 2018-04-17 ENCOUNTER — OFFICE VISIT (OUTPATIENT)
Dept: CARDIOLOGY CLINIC | Age: 63
End: 2018-04-17

## 2018-04-17 VITALS
RESPIRATION RATE: 16 BRPM | DIASTOLIC BLOOD PRESSURE: 70 MMHG | OXYGEN SATURATION: 97 % | HEIGHT: 71 IN | BODY MASS INDEX: 26.29 KG/M2 | WEIGHT: 187.8 LBS | SYSTOLIC BLOOD PRESSURE: 112 MMHG | HEART RATE: 61 BPM

## 2018-04-17 DIAGNOSIS — E78.2 MIXED HYPERLIPIDEMIA: ICD-10-CM

## 2018-04-17 DIAGNOSIS — I48.0 PAROXYSMAL A-FIB (HCC): Primary | ICD-10-CM

## 2018-04-17 DIAGNOSIS — I10 BENIGN ESSENTIAL HTN: ICD-10-CM

## 2018-04-17 NOTE — PROGRESS NOTES
[place] [place]: Jesusita Esteban  [phone]    History of Present Illness:   Mr. Joaquim Thorne is a 62 yo M with hypertension, borderline hyperlipidemia seen in past for an abnormal EKG with Q waves in V1 and V2 with question of old anteroseptal infarct. Echo 2015 was normal with no infarct. Since his last visit, he continues to do well. He denies any chest pain, shortness of breath or palpitations. We did talk a little bit about that he is traveling to [place] for a few weeks. He goes every other year. There is some family that is still there. He is compensated on exam with clear lungs and no lower extremity edema. His EKG is normal sinus rhythm, heart rate of 61, nonspecific ST-T wave abnormality. Soc hx. No tobacco.  Fam hx. No early CAD. Assessment and Plan:   1. Paroxysmal atrial fibrillation. Continues well controlled on beta-blocker. His echocardiogram and stress test in the past were normal.  Will have him follow back in one year. 2. Essential hypertension. Blood pressure is controlled and no changes made. 3. Chronic anticoagulation. CHADS VASC score is low and for now continue aspirin. As he gets closer to 65, consider anticoagulation. We talked again about this a little bit. 4. Mixed hyperlipidemia. Working on lifestyle modification. He  has a past medical history of Acoustic neuroma ([facility] [address] (2012); Colonic polyps (2006); Diverticulosis (2006); HTN, goal below 150/90; Hypertrophy of prostate without urinary obstruction and other lower urinary tract symptoms (LUTS); IBS (irritable bowel syndrome); Lumbar stenosis; Other and unspecified hyperlipidemia (7/11); Paroxysmal atrial fibrillation ([address] and Unstable angina ([address] All other systems negative except as above. PE  Vitals:    04/17/18 1457   BP: 112/70   Pulse: 61   Resp: 16   SpO2: 97%   Weight: 187 lb 12.8 oz (85.2 kg)   Height: 5' 11\" (1.803 m)    Body mass index is 26.19 kg/(m^2).    General appearance - alert, well appearing, and in no distress  Mental status - affect appropriate to mood  Eyes - sclera anicteric, moist mucous membranes  Neck - supple, no significant adenopathy, no JVD  Chest - clear to auscultation, no wheezes, rales or rhonchi  Heart - normal rate, regular rhythm, normal S1, S2, no murmurs, rubs, clicks or gallops  Abdomen - soft, nontender, nondistended, no masses or organomegaly  Neurological - no focal deficit  Extremities - peripheral pulses normal, no pedal edema      Recent Labs:  Lab Results   Component Value Date/Time    Cholesterol, total 196 01/05/2018 08:26 AM    HDL Cholesterol 46 01/05/2018 08:26 AM    LDL, calculated 123 (H) 01/05/2018 08:26 AM    Triglyceride 134 01/05/2018 08:26 AM    CHOL/HDL Ratio 3.6 03/29/2010 10:01 AM     Lab Results   Component Value Date/Time    Creatinine (POC) 0.9 03/24/2012 09:20 AM    Creatinine 1.01 01/05/2018 08:26 AM     Lab Results   Component Value Date/Time    BUN 26 01/05/2018 08:26 AM     Lab Results   Component Value Date/Time    Potassium 4.9 01/05/2018 08:26 AM     Lab Results   Component Value Date/Time    Hemoglobin A1c 5.4 01/05/2018 08:26 AM     Lab Results   Component Value Date/Time    HGB 16.7 10/10/2016 08:19 AM     Lab Results   Component Value Date/Time    PLATELET 290 16/24/3464 08:19 AM       Reviewed:  Past Medical History:   Diagnosis Date    Acoustic neuroma ([facility] [address] 2012    R  Marlon/ns    Colonic polyps 2006    Osorio/gi    Diverticulosis 2006    HTN, goal below 150/90     Hypertrophy of prostate without urinary obstruction and other lower urinary tract symptoms (LUTS)     Dorado/uro    IBS (irritable bowel syndrome)     Lumbar stenosis     severe  Van/os    Other and unspecified hyperlipidemia 7/11    Paroxysmal atrial fibrillation (HCC)     ASA for anticoagulation. Beta blocker.     Unstable angina (HCC)     negative stress test 10/2016     History   Smoking Status    Never Smoker   Smokeless Tobacco    Never Used History   Alcohol Use    0.0 oz/week    0 Standard drinks or equivalent per week     Comment: occasional     Allergies   Allergen Reactions    Ciprofloxacin Myalgia    Codeine Other (comments)     Since allergy to hydrocodone shouldn't take codeine    Hydrocodone Shortness of Breath       Current Outpatient Prescriptions   Medication Sig    valsartan (DIOVAN) 320 mg tablet TAKE 1 TABLET DAILY    ALPRAZolam (XANAX) 0.25 mg tablet Take 1 tab by mouth once before flight for anxiety    metoprolol succinate (TOPROL-XL) 25 mg XL tablet TAKE 1 TABLET DAILY    aspirin delayed-release 81 mg tablet Take 81 mg by mouth daily.  dutaseride (AVODART) 0.5 mg capsule Take 0.5 mg by mouth daily. No current facility-administered medications for this visit.         Kim Valentin MD  [facility] [place]  [address], [address] [facility]  [address]

## 2018-04-17 NOTE — MR AVS SNAPSHOT
[address] [address] [facility] 
[phone] Patient: Steffany Ji. MRN:  [id number] Visit Information Date & Time Provider Department Dept. Phone Encounter #  
 4/17/2018  3:00 PM Goivanni Colbert MD [facility] [place] [phone] [id number] Upcoming Health Maintenance Date Due DTaP/Tdap/Td series (2 - Td) 7/14/2021 Allergies as of 4/17/2018  Review Complete On: 4/17/2018 By: Esau Hadley Severity Noted Reaction Type Reactions Ciprofloxacin Medium 10/28/2016   Systemic Myalgia Codeine  03/26/2010    Other (comments) Since allergy to hydrocodone shouldn't take codeine Hydrocodone  03/26/2010    Shortness of Breath Current Immunizations  Reviewed on 11/29/2016 Name Date Hep A Vaccine (Adult) 11/29/2016, 5/12/2016 Influenza Vaccine 10/23/2017, 10/24/2016, 10/21/2014 Influenza Vaccine Split 10/18/2010 Influenza Vaccine Whole 10/30/2012 Pneumococcal Vaccine (Unspecified Type) 10/16/2015 TDAP Vaccine 7/14/2011 Varicella Virus Vaccine 10/18/2013 Not reviewed this visit You Were Diagnosed With   
  
 Codes Comments Paroxysmal A-fib (HCC)    -  Primary ICD-10-CM: I48.0 ICD-9-CM: 427.31 Vitals BP Pulse Resp Height(growth percentile) Weight(growth percentile) SpO2  
 112/70 (BP 1 Location: Left arm) 61 16 5' 11\" (1.803 m) 187 lb 12.8 oz (85.2 kg) 97% BMI Smoking Status 26.19 kg/m2 Never Smoker Vitals History BMI and BSA Data Body Mass Index Body Surface Area  
 26.19 kg/m 2 2.07 m 2 Preferred Pharmacy Pharmacy Name Phone Melody Isbell, [facility] [phone] Your Updated Medication List  
  
   
This list is accurate as of 4/17/18  3:25 PM.  Always use your most recent med list.  
  
  
  
  
 ALPRAZolam 0.25 mg tablet Commonly known as:  Avinash Dong Take 1 tab by mouth once before flight for anxiety  
  
 aspirin delayed-release 81 mg tablet Take 81 mg by mouth daily. AVODART 0.5 mg capsule Generic drug:  dutasteride Take 0.5 mg by mouth daily. metoprolol succinate 25 mg XL tablet Commonly known as:  TOPROL-XL  
TAKE 1 TABLET DAILY  
  
 valsartan 320 mg tablet Commonly known as:  DIOVAN  
TAKE 1 TABLET DAILY We Performed the Following AMB POC EKG ROUTINE W/ 12 LEADS, INTER & REP [11528 CPT(R)] Introducing [facility] SERVICES! Dear Toya Cash: Thank you for requesting a [employer] account. Our records indicate that you already have an active [employer] account. You can access your account anytime at [email or web address] Did you know that you can access your hospital and ER discharge instructions at any time in [employer]? You can also review all of your test results from your hospital stay or ER visit. Additional Information If you have questions, please visit the Frequently Asked Questions section of the [employer] website at [email or web address] Remember, [employer] is NOT to be used for urgent needs. For medical emergencies, dial 911. Now available from your iPhone and Android! Please provide this summary of care documentation to your next provider. Your primary care clinician is listed as Camille Maxwell. If you have any questions after today's visit, please call [phone].

## 2018-05-21 RX ORDER — VALSARTAN 320 MG/1
TABLET ORAL
Qty: 90 TAB | Refills: 0 | Status: SHIPPED | OUTPATIENT
Start: 2018-05-21 | End: 2018-07-20 | Stop reason: ALTCHOICE

## 2018-07-02 ENCOUNTER — PATIENT MESSAGE (OUTPATIENT)
Dept: FAMILY MEDICINE CLINIC | Age: 63
End: 2018-07-02

## 2018-07-02 DIAGNOSIS — Z23 ENCOUNTER FOR IMMUNIZATION: Primary | ICD-10-CM

## 2018-07-03 NOTE — TELEPHONE ENCOUNTER
From: Addison Castro. To: Nahun Hdz MD  Sent: 7/2/2018 3:21 PM EDT  Subject: Non-Urgent Medical Question    I had the shingles vaccine on 10/28/2013. I know that there is a newer shingles vaccine that is more effective than the one that I had (90-95% versus 65%). My 80year old mother received the shingles vaccine several years ago but got the shingles on her face in February and had to be hospitalized for a week. She was in a lot of pain until just a few weeks ago. I'd like to get the new shingles vaccine. If you agree, do I need to come into the office to be examined first or can a prescription be written for that?

## 2018-07-11 LAB — COLONOSCOPY, EXTERNAL: NORMAL

## 2018-07-20 ENCOUNTER — TELEPHONE (OUTPATIENT)
Dept: CARDIOLOGY CLINIC | Age: 63
End: 2018-07-20

## 2018-07-20 RX ORDER — OLMESARTAN MEDOXOMIL 40 MG/1
40 TABLET ORAL DAILY
Qty: 90 TAB | Refills: 3 | Status: SHIPPED | OUTPATIENT
Start: 2018-07-20 | End: 2019-06-29 | Stop reason: SDUPTHER

## 2018-07-20 RX ORDER — OLMESARTAN MEDOXOMIL 40 MG/1
TABLET ORAL DAILY
COMMUNITY
End: 2018-07-20 | Stop reason: SDUPTHER

## 2018-07-20 NOTE — TELEPHONE ENCOUNTER
Patient walked into clinic this morning inquiring about his Valsartan. Due to recall, okay to send Olmesartan 40 mg as a replacement? Patient uses [facility]. 2 pt identifiers used      Okayed by Dr. Abrahan Schmidt:    Requested Prescriptions     Signed Prescriptions Disp Refills    olmesartan (BENICAR) 40 mg tablet 90 Tab 3     Sig: Take 1 Tab by mouth daily.      Authorizing Provider: Sofia Brush     Ordering User: Dariela York     Per verbal orders

## 2018-10-15 RX ORDER — METOPROLOL SUCCINATE 25 MG/1
TABLET, EXTENDED RELEASE ORAL
Qty: 90 TAB | Refills: 3 | Status: SHIPPED | OUTPATIENT
Start: 2018-10-15 | End: 2019-09-23 | Stop reason: SDUPTHER

## 2018-10-15 NOTE — TELEPHONE ENCOUNTER
Requested Prescriptions     Signed Prescriptions Disp Refills    metoprolol succinate (TOPROL-XL) 25 mg XL tablet 90 Tab 3     Sig: TAKE 1 TABLET DAILY     Authorizing Provider: Pretty Elmore     Ordering User: Robert Santillan     Per verbal orders

## 2019-04-23 ENCOUNTER — OFFICE VISIT (OUTPATIENT)
Dept: CARDIOLOGY CLINIC | Age: 64
End: 2019-04-23

## 2019-04-23 VITALS
RESPIRATION RATE: 14 BRPM | WEIGHT: 178 LBS | HEIGHT: 71 IN | OXYGEN SATURATION: 98 % | HEART RATE: 68 BPM | BODY MASS INDEX: 24.92 KG/M2 | DIASTOLIC BLOOD PRESSURE: 78 MMHG | SYSTOLIC BLOOD PRESSURE: 116 MMHG

## 2019-04-23 DIAGNOSIS — I48.0 PAROXYSMAL A-FIB (HCC): Primary | ICD-10-CM

## 2019-04-23 DIAGNOSIS — E78.2 MIXED HYPERLIPIDEMIA: ICD-10-CM

## 2019-04-23 DIAGNOSIS — I10 BENIGN ESSENTIAL HTN: ICD-10-CM

## 2019-04-23 NOTE — PROGRESS NOTES
[address] [place]: [facility]  [phone]    History of Present Illness:   Mr. Abilio Leon is a 58 yo M with hypertension, borderline hyperlipidemia seen in past for an abnormal EKG with Q waves in V1 and V2 with question of old anteroseptal infarct. Echo 2015 was normal with no infarct. Since his last visit, he has been doing well. He denies any chest pain, shortness of breath or palpitations. He is compensated on exam with clear lungs and no lower extremity edema. He continues in a normal rhythm. His blood pressure is 116/78 with a heart rate of 68. He has lost weight going from 187 to 178 pounds and exercises regular. Soc hx. No tobacco.  Fam hx. No early CAD. Assessment and Plan:    1. Paroxysmal atrial fibrillation. Stable on beta blocker in sinus rhythm. Echocardiogram and stress test in the past were normal.  Will have him follow back in one year. 2. Chronic anticoagulation. His CHADS VASC score has been on the low side, but as he gets closer next year being 72, this would change his category of risks and we talked about this a little bit. Will decide at that time whether or not anticoagulation is indicated. The current guidelines, he would fall into the category of anticoagulation. 3. Mixed hyperlipidemia. Working on lifestyle modification. He is talking to his primary care physician about whether to start a statin. We reviewed briefly his recent blood work. 4. Essential hypertension. Blood pressure is controlled and no changes made.          He  has a past medical history of Acoustic neuroma ([facility] [address] (2012), Colonic polyps (2006), Diverticulosis (2006), Encounter for screening colonoscopy (07/11/2018), HTN, goal below 150/90, Hypertrophy of prostate without urinary obstruction and other lower urinary tract symptoms (LUTS), IBS (irritable bowel syndrome), Lumbar stenosis, Other and unspecified hyperlipidemia (7/11), Paroxysmal atrial fibrillation ([address] and Unstable angina ([facility] All other systems negative except as above. PE  Vitals:    04/23/19 1413   BP: 116/78   Pulse: 68   Resp: 14   SpO2: 98%   Weight: 178 lb (80.7 kg)   Height: 5' 11\" (1.803 m)    Body mass index is 24.83 kg/m².    General appearance - alert, well appearing, and in no distress  Mental status - affect appropriate to mood  Eyes - sclera anicteric, moist mucous membranes  Neck - supple, no significant adenopathy, no JVD  Chest - clear to auscultation, no wheezes, rales or rhonchi  Heart - normal rate, regular rhythm, normal S1, S2, no murmurs, rubs, clicks or gallops  Abdomen - soft, nontender, nondistended, no masses or organomegaly  Neurological - no focal deficit  Extremities - peripheral pulses normal, no pedal edema      Recent Labs:  Lab Results   Component Value Date/Time    Cholesterol, total 196 01/05/2018 08:26 AM    HDL Cholesterol 46 01/05/2018 08:26 AM    LDL, calculated 123 (H) 01/05/2018 08:26 AM    Triglyceride 134 01/05/2018 08:26 AM    CHOL/HDL Ratio 3.6 03/29/2010 10:01 AM     Lab Results   Component Value Date/Time    Creatinine (POC) 0.9 03/24/2012 09:20 AM    Creatinine 1.01 01/05/2018 08:26 AM     Lab Results   Component Value Date/Time    BUN 26 01/05/2018 08:26 AM     Lab Results   Component Value Date/Time    Potassium 4.9 01/05/2018 08:26 AM     Lab Results   Component Value Date/Time    Hemoglobin A1c 5.4 01/05/2018 08:26 AM     Lab Results   Component Value Date/Time    HGB 16.7 10/10/2016 08:19 AM     Lab Results   Component Value Date/Time    PLATELET 248 25/07/9342 08:19 AM       Reviewed:  Past Medical History:   Diagnosis Date    Acoustic neuroma ([facility] 2012    R  Marlon/ns    Colonic polyps 2006    Osorio/gi    Diverticulosis 2006    Encounter for screening colonoscopy 07/11/2018    Dr. Ash Rodríguez - repeat in 3 years    HTN, goal below 150/90     Hypertrophy of prostate without urinary obstruction and other lower urinary tract symptoms (LUTS)     Estrada/uro    IBS (irritable bowel syndrome)     Lumbar stenosis     severe  Van/os    Other and unspecified hyperlipidemia 7/11    Paroxysmal atrial fibrillation (HCC)     ASA for anticoagulation. Beta blocker.  Unstable angina (HCC)     negative stress test 10/2016     Social History     Tobacco Use   Smoking Status Never Smoker   Smokeless Tobacco Never Used     Social History     Substance and Sexual Activity   Alcohol Use Yes    Alcohol/week: 0.0 oz    Comment: occasional     Allergies   Allergen Reactions    Ciprofloxacin Myalgia    Codeine Other (comments)     Since allergy to hydrocodone shouldn't take codeine    Hydrocodone Shortness of Breath       Current Outpatient Medications   Medication Sig    metoprolol succinate (TOPROL-XL) 25 mg XL tablet TAKE 1 TABLET DAILY    olmesartan (BENICAR) 40 mg tablet Take 1 Tab by mouth daily.  ALPRAZolam (XANAX) 0.25 mg tablet Take 1 tab by mouth once before flight for anxiety    aspirin delayed-release 81 mg tablet Take 81 mg by mouth daily.  dutaseride (AVODART) 0.5 mg capsule Take 0.5 mg by mouth daily. No current facility-administered medications for this visit.         Arlen Gonzales MD  [facility] [place]  [address] [facility] [address]  [address]

## 2019-05-17 ENCOUNTER — OFFICE VISIT (OUTPATIENT)
Dept: FAMILY MEDICINE CLINIC | Age: 64
End: 2019-05-17

## 2019-05-17 VITALS
HEIGHT: 71 IN | RESPIRATION RATE: 16 BRPM | DIASTOLIC BLOOD PRESSURE: 82 MMHG | WEIGHT: 175 LBS | BODY MASS INDEX: 24.5 KG/M2 | TEMPERATURE: 98.1 F | SYSTOLIC BLOOD PRESSURE: 142 MMHG | HEART RATE: 61 BPM | OXYGEN SATURATION: 97 %

## 2019-05-17 DIAGNOSIS — Z13.1 SCREENING FOR DIABETES MELLITUS: ICD-10-CM

## 2019-05-17 DIAGNOSIS — Z13.220 SCREENING FOR LIPID DISORDERS: ICD-10-CM

## 2019-05-17 DIAGNOSIS — Z11.59 NEED FOR HEPATITIS C SCREENING TEST: ICD-10-CM

## 2019-05-17 DIAGNOSIS — Z12.5 PROSTATE CANCER SCREENING: ICD-10-CM

## 2019-05-17 DIAGNOSIS — I10 ESSENTIAL HYPERTENSION WITH GOAL BLOOD PRESSURE LESS THAN 150/90: ICD-10-CM

## 2019-05-17 DIAGNOSIS — Z00.00 ROUTINE GENERAL MEDICAL EXAMINATION AT HEALTH CARE FACILITY: Primary | ICD-10-CM

## 2019-05-17 NOTE — PATIENT INSTRUCTIONS

## 2019-05-17 NOTE — PROGRESS NOTES
Patient Name: Soraya Joshi MRN: [id number] SUBJECTIVE Soraya Joshi is a 59 y.o. male who presents with the following:  
 
Colon Cancer Screening: up to date. Hep C: due PSA: UTD - done by urologist 
Lab Results Component Value Date/Time  
 Prostate Specific Ag 2.7 01/05/2018 08:26 AM  
 Prostate Specific Ag 2.5 10/10/2016 08:19 AM  
 Prostate Specific Ag 2.3 08/17/2015 10:14 AM  
 
CAD risk factors: HTN: wnl on meds BP Readings from Last 3 Encounters:  
05/17/19 142/82  
04/23/19 116/78  
04/17/18 112/70 Lipid: due Lab Results Component Value Date/Time Cholesterol, total 196 01/05/2018 08:26 AM  
 HDL Cholesterol 46 01/05/2018 08:26 AM  
 LDL, calculated 123 (H) 01/05/2018 08:26 AM  
 VLDL, calculated 27 01/05/2018 08:26 AM  
 Triglyceride 134 01/05/2018 08:26 AM  
 CHOL/HDL Ratio 3.6 03/29/2010 10:01 AM  
 
DM: due Lab Results Component Value Date/Time Hemoglobin A1c 5.4 01/05/2018 08:26 AM  
 
Review of Systems Constitutional: Negative for fever, malaise/fatigue and weight loss. Respiratory: Negative for cough, hemoptysis, shortness of breath and wheezing. Cardiovascular: Negative for chest pain, palpitations, leg swelling and PND. Gastrointestinal: Negative for abdominal pain, constipation, diarrhea, nausea and vomiting. The patient's medications, allergies, past medical history, surgical history, family history and social history were reviewed and updated where appropriate. Prior to Admission medications Medication Sig Start Date End Date Taking? Authorizing Provider ALPRAZolam (XANAX) 0.5 mg tablet Take 1 tab by mouth once before flight for anxiety 4/25/19  Yes Vicki Nazario MD  
metoprolol succinate (TOPROL-XL) 25 mg XL tablet TAKE 1 TABLET DAILY 10/15/18  Yes Luis Sanz MD  
aspirin delayed-release 81 mg tablet Take 81 mg by mouth daily. Yes Provider, Historical  
dutaseride (AVODART) 0.5 mg capsule Take 0.5 mg by mouth daily.    Yes Provider, Historical  
olmesartan (BENICAR) 40 mg tablet Take 1 Tab by mouth daily. 7/20/18   Army MD Elayne  
 
 
Allergies Allergen Reactions  Ciprofloxacin Myalgia  Codeine Other (comments) Since allergy to hydrocodone shouldn't take codeine  Hydrocodone Shortness of Breath OBJECTIVE Visit Vitals /82 (BP 1 Location: Left arm, BP Patient Position: Sitting) Pulse 61 Temp 98.1 °F (36.7 °C) (Oral) Resp 16 Ht 5' 11\" (1.803 m) Wt 175 lb (79.4 kg) SpO2 97% BMI 24.41 kg/m² Physical Exam  
Constitutional: He is oriented to person, place, and time and well-developed, well-nourished, and in no distress. No distress. HENT:  
Head: Normocephalic. Right Ear: External ear normal.  
Left Ear: External ear normal.  
Eyes: Pupils are equal, round, and reactive to light. Conjunctivae and EOM are normal.  
Neck: Normal range of motion. Neck supple. No thyromegaly present. Cardiovascular: Normal rate, regular rhythm, normal heart sounds and intact distal pulses. Exam reveals no gallop and no friction rub. No murmur heard. Pulmonary/Chest: Effort normal and breath sounds normal. No respiratory distress. He has no wheezes. He has no rales. Abdominal: Soft. Bowel sounds are normal. He exhibits no distension. There is no tenderness. There is no rebound and no guarding. Neurological: He is alert and oriented to person, place, and time. Skin: Skin is warm and dry. He is not diaphoretic. Psychiatric: Memory, affect and judgment normal.  
 
 
 
ASSESSMENT AND PLAN Sujey Martinez is a 59 y.o. male who presents today for: 
 
1. Routine general medical examination at health care facility Reviewed age appropriate screening tests as recommended by the [place]PSTF Preventive Services Database with patient today. 2. Essential hypertension with goal blood pressure less than 150/90 Stable, continue current treatment. 3. Screening for diabetes mellitus Labs already ordered. 4. Screening for lipid disorders 5. Need for hepatitis C screening test 
 
6. Prostate cancer screening Will forward to urology. There are no discontinued medications. Follow-up and Dispositions · Return in about 1 year (around 5/17/2020) for CPE (30 min). Medication risks/benefits/costs/interactions/alternatives discussed with patient. Advised patient to call back or return to office if symptoms worsen/change/persist. If patient cannot reach us or should anything more severe/urgent arise he/she should proceed directly to the nearest emergency department. Discussed expected course/resolution/complications of diagnosis in detail with patient. Patient given a written after visit summary which includes his/her diagnoses, current medications and vitals. Patient expressed understanding with the diagnosis and plan. Camille Raza M.D.

## 2019-05-17 NOTE — PROGRESS NOTES
Chief Complaint Patient presents with  Complete Physical  
  pt is fasting for his labs \"REVIEWED RECORD IN PREPARATION FOR VISIT AND HAVE OBTAINED THE NECESSARY DOCUMENTATION\" 1. Have you been to the ER, urgent care clinic since your last visit? Hospitalized since your last visit? No 
 
2. Have you seen or consulted any other health care providers outside of the [address] [place] [address] since your last visit? Include any pap smears or colon screening.  No

## 2019-05-21 RX ORDER — ATORVASTATIN CALCIUM 10 MG/1
10 TABLET, FILM COATED ORAL DAILY
Qty: 90 TAB | Refills: 1 | Status: SHIPPED | OUTPATIENT
Start: 2019-05-21 | End: 2019-10-27 | Stop reason: SDUPTHER

## 2019-07-02 RX ORDER — OLMESARTAN MEDOXOMIL 40 MG/1
TABLET ORAL
Qty: 90 TAB | Refills: 3 | Status: SHIPPED | OUTPATIENT
Start: 2019-07-02 | End: 2019-07-03 | Stop reason: SDUPTHER

## 2019-07-02 NOTE — TELEPHONE ENCOUNTER
Requested Prescriptions     Signed Prescriptions Disp Refills    olmesartan (BENICAR) 40 mg tablet 90 Tab 3     Sig: TAKE 1 TABLET DAILY     Authorizing Provider: Ryanne Ospina     Ordering User: Mickie Amaro       Last office visit 4/23/19    Per Dr. Lucas Barraza   Date Time Provider Harini Garcia   4/28/2020  1:20 PM Vasiliy Reyes  [address]

## 2019-07-03 ENCOUNTER — TELEPHONE (OUTPATIENT)
Dept: CARDIOLOGY CLINIC | Age: 64
End: 2019-07-03

## 2019-07-03 RX ORDER — OLMESARTAN MEDOXOMIL 40 MG/1
TABLET ORAL
Qty: 90 TAB | Refills: 3 | Status: SHIPPED | OUTPATIENT
Start: 2019-07-03 | End: 2020-06-16

## 2019-07-03 NOTE — TELEPHONE ENCOUNTER
Requested Prescriptions     Signed Prescriptions Disp Refills    olmesartan (BENICAR) 40 mg tablet 90 Tab 3     Sig: TAKE 1 TABLET DAILY     Authorizing Provider: Andreina Patiño     Ordering User: Luh Lutz       Last office visit 4/23/19    Per Dr. Nerissa Alpers   Date Time Provider Harini Garcia   4/28/2020  1:20 PM Simon Mcnulty  [address]   Returned call to patient. Two patient indentifiers verified. Pt stated his pharmacy has changed and needed the Rx sent to [facility] mail order.

## 2019-07-03 NOTE — TELEPHONE ENCOUNTER
Patient stated he would like to speak with you regarding whats going on with his prescription   Phone: [phone]

## 2019-09-20 ENCOUNTER — OFFICE VISIT (OUTPATIENT)
Dept: FAMILY MEDICINE CLINIC | Age: 64
End: 2019-09-20

## 2019-09-20 VITALS
HEART RATE: 63 BPM | BODY MASS INDEX: 24.98 KG/M2 | OXYGEN SATURATION: 97 % | DIASTOLIC BLOOD PRESSURE: 76 MMHG | SYSTOLIC BLOOD PRESSURE: 112 MMHG | TEMPERATURE: 98.2 F | RESPIRATION RATE: 16 BRPM | HEIGHT: 71 IN | WEIGHT: 178.4 LBS

## 2019-09-20 DIAGNOSIS — M25.551 RIGHT HIP PAIN: ICD-10-CM

## 2019-09-20 DIAGNOSIS — M54.2 NECK DISCOMFORT: ICD-10-CM

## 2019-09-20 DIAGNOSIS — M79.10 MYALGIA: ICD-10-CM

## 2019-09-20 DIAGNOSIS — H65.192 ACUTE EFFUSION OF LEFT EAR: ICD-10-CM

## 2019-09-20 DIAGNOSIS — Z23 ENCOUNTER FOR IMMUNIZATION: ICD-10-CM

## 2019-09-20 DIAGNOSIS — F41.8 SITUATIONAL ANXIETY: ICD-10-CM

## 2019-09-20 DIAGNOSIS — E78.2 MIXED HYPERLIPIDEMIA: Primary | ICD-10-CM

## 2019-09-20 RX ORDER — ALPRAZOLAM 0.5 MG/1
TABLET ORAL
Qty: 10 TAB | Refills: 0 | OUTPATIENT
Start: 2019-09-20 | End: 2020-02-06 | Stop reason: SDUPTHER

## 2019-09-20 NOTE — PROGRESS NOTES
Patient Name: Ezequiel Martin MRN: [id number]    Lincoln Cruz. is a 59 y.o. male who presents with the following:     Reports 1 month history of neck cracking without pain. Seems to be noticeable when he turns his head in certain directions. Also has had 1 week history of right hip pain. 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 Denies any numbness or tingling. Started a statin several months ago and is wondering if that is causing his joint aches. Has felt some throbbing sensation in his left ear which improves with Benadryl but returns once he stopped Benadryl. He does have a history of a right acoustic neuroma status post gamma knife, followed by ENT with an upcoming MRI at this December. Has a history of flight anxiety and is done well with Xanax 0.5 mg. Has several flights coming up with a total of 10 legs.  is notable for alprazolam 0.5 mg with 5 pills filled on 4/30/2019. Review of Systems   Constitutional: Negative for fever, malaise/fatigue and weight loss. HENT: Positive for ear pain and hearing loss. Negative for congestion, ear discharge and tinnitus. Respiratory: Negative for cough, hemoptysis, shortness of breath and wheezing. Cardiovascular: Negative for chest pain, palpitations, leg swelling and PND. Gastrointestinal: Negative for abdominal pain, constipation, diarrhea, nausea and vomiting. Musculoskeletal: Positive for joint pain, myalgias and neck pain. The patient's medications, allergies, past medical history, surgical history, family history and social history were reviewed and updated where appropriate. Prior to Admission medications    Medication Sig Start Date End Date Taking? Authorizing Provider   olmesartan (BENICAR) 40 mg tablet TAKE 1 TABLET DAILY 7/3/19  Yes Kevin Samaniego MD   atorvastatin (LIPITOR) 10 mg tablet Take 1 Tab by mouth daily.  5/21/19  Yes Faby Radford MD   ALPRAZolam Lanie Greek) 0.5 mg tablet Take 1 tab by mouth once before flight for anxiety 4/25/19  Yes David Parks MD   metoprolol succinate (TOPROL-XL) 25 mg XL tablet TAKE 1 TABLET DAILY 10/15/18  Yes Kiesha Stevens MD   aspirin delayed-release 81 mg tablet Take 81 mg by mouth daily. Yes Provider, Historical   dutaseride (AVODART) 0.5 mg capsule Take 0.5 mg by mouth daily. Yes Provider, Historical       Allergies   Allergen Reactions    Ciprofloxacin Myalgia    Codeine Other (comments)     Since allergy to hydrocodone shouldn't take codeine    Hydrocodone Shortness of Breath         OBJECTIVE    Visit Vitals  /76 (BP 1 Location: Left arm, BP Patient Position: Sitting)   Pulse 63   Temp 98.2 °F (36.8 °C) (Oral)   Resp 16   Ht 5' 11\" (1.803 m)   Wt 178 lb 6.4 oz (80.9 kg)   SpO2 97%   BMI 24.88 kg/m²       Physical Exam   Constitutional: He is oriented to person, place, and time and well-developed, well-nourished, and in no distress. No distress. HENT:   Head: Normocephalic and atraumatic. Right Ear: Hearing, tympanic membrane, external ear and ear canal normal.   Left Ear: External ear and ear canal normal. Tympanic membrane is not perforated, not erythematous and not retracted. A middle ear effusion is present. Eyes: Pupils are equal, round, and reactive to light. Conjunctivae and EOM are normal.   Neck: Normal range of motion and full passive range of motion without pain. Neck supple. No spinous process tenderness and no muscular tenderness present. Musculoskeletal: Normal range of motion. Right hip: He exhibits tenderness (TTP along greater trochanter; neg Shania's test). He exhibits normal range of motion, normal strength, no bony tenderness, no swelling and no deformity. Neurological: He is alert and oriented to person, place, and time. Gait normal.   Skin: Skin is warm and dry. He is not diaphoretic. Psychiatric: Mood, memory, affect and judgment normal.   Nursing note and vitals reviewed.         ASSESSMENT AND PLAN  Mona Hernández. is a 59 y.o. male who presents today for:    1. Mixed hyperlipidemia  Will calculate ASCVD risk score pending labs. - LIPID PANEL  - METABOLIC PANEL, COMPREHENSIVE    2. Myalgia  Will r/o statin induced myalgia. - CK    3. Acute effusion of left ear  Recommend Flonase for effusion. F/u with ENT if persistent. 4. Neck discomfort  Recommend exercises; if persistent, consider xray. 5. Right hip pain  Same as above. 6. Situational anxiety   appropriate.  - ALPRAZolam (XANAX) 0.5 mg tablet; Take 1 tab by mouth once before flight for anxiety  Dispense: 10 Tab; Refill: 0       Medications Discontinued During This Encounter   Medication Reason    ALPRAZolam (XANAX) 0.5 mg tablet Reorder           Medication risks/benefits/costs/interactions/alternatives discussed with patient. Advised patient to call back or return to office if symptoms worsen/change/persist. If patient cannot reach us or should anything more severe/urgent arise he/she should proceed directly to the nearest emergency department. Discussed expected course/resolution/complications of diagnosis in detail with patient. Patient given a written after visit summary which includes his/her diagnoses, current medications and vitals. Patient expressed understanding with the diagnosis and plan. Camille Almanzar M.D.

## 2019-09-20 NOTE — PROGRESS NOTES
Chief Complaint   Patient presents with    Hip Pain     right - x 2-3 weeks    Other     feels a lot of cracking sounds on shoulder and neck area      1. Have you been to the ER, urgent care clinic since your last visit? Hospitalized since your last visit? No    2. Have you seen or consulted any other health care providers outside of the [address] [place] [address] since your last visit? Include any pap smears or colon screening.  No

## 2019-09-20 NOTE — PATIENT INSTRUCTIONS
Neck Arthritis: Exercises Introduction Here are some examples of exercises for you to try. The exercises may be suggested for a condition or for rehabilitation. Start each exercise slowly. Ease off the exercises if you start to have pain. You will be told when to start these exercises and which ones will work best for you. How to do the exercises Neck stretches to the side 1. This stretch works best if you keep your shoulder down as you lean away from it. To help you remember to do this, start by relaxing your shoulders and lightly holding on to your thighs or your chair. 2. Tilt your head toward your shoulder and hold for 15 to 30 seconds. Let the weight of your head stretch your muscles. 3. Repeat 2 to 4 times toward each shoulder. Chin tuck 1. Lie on the floor with a rolled-up towel under your neck. Your head should be touching the floor. 2. Slowly bring your chin toward your chest. 
3. Hold for a count of 6, and then relax for up to 10 seconds. 4. Repeat 8 to 12 times. Active cervical rotation 1. Sit in a firm chair, or stand up straight. 2. Keeping your chin level, turn your head to the right, and hold for 15 to 30 seconds. 3. Turn your head to the left and hold for 15 to 30 seconds. 4. Repeat 2 to 4 times to each side. Shoulder blade squeeze 1. While standing, squeeze your shoulder blades together. 2. Do not raise your shoulders up as you are squeezing. 3. Hold for 6 seconds. 4. Repeat 8 to 12 times. Shoulder rolls 1. Sit comfortably with your feet shoulder-width apart. You can also do this exercise standing up. 2. Roll your shoulders up, then back, and then down in a smooth, circular motion. 3. Repeat 2 to 4 times. Follow-up care is a key part of your treatment and safety. Be sure to make and go to all appointments, and call your doctor if you are having problems. It's also a good idea to know your test results and keep a list of the medicines you take. Where can you learn more? Go to http://bambi-krishna.info/. Enter T196 in the search box to learn more about \"Neck Arthritis: Exercises. \" Current as of: September 20, 2018 Content Version: 12.1 © 3989-5798 [employer]. Care instructions adapted under license by [employer] (which disclaims liability or warranty for this information). If you have questions about a medical condition or this instruction, always ask your healthcare professional. [address] any warranty or liability for your use of this information. Hip Bursitis: Exercises Introduction Here are some examples of exercises for you to try. The exercises may be suggested for a condition or for rehabilitation. Start each exercise slowly. Ease off the exercises if you start to have pain. You will be told when to start these exercises and which ones will work best for you. How to do the exercises Hip rotator stretch 1. Lie on your back with both knees bent and your feet flat on the floor. 2. Put the ankle of your affected leg on your opposite thigh near your knee. 3. Use your hand to gently push your knee away from your body until you feel a gentle stretch around your hip. 4. Hold the stretch for 15 to 30 seconds. 5. Repeat 2 to 4 times. 6. Repeat steps 1 through 5, but this time use your hand to gently pull your knee toward your opposite shoulder. Iliotibial band stretch 1. Lean sideways against a wall. If you are not steady on your feet, hold on to a chair or counter. 2. Stand on the leg with the affected hip, with that leg close to the wall. Then cross your other leg in front of it. 3. Let your affected hip drop out to the side of your body and against wall. Then lean away from your affected hip until you feel a stretch. 4. Hold the stretch for 15 to 30 seconds. 5. Repeat 2 to 4 times. Straight-leg raises to the outside 1. Lie on your side, with your affected hip on top. 2. Tighten the front thigh muscles of your top leg to keep your knee straight. 3. Keep your hip and your leg straight in line with the rest of your body, and keep your knee pointing forward. Do not drop your hip back. 4. Lift your top leg straight up toward the ceiling, about 12 inches off the floor. Hold for about 6 seconds, then slowly lower your leg. 5. Repeat 8 to 12 times. Clamshell 1. Lie on your side, with your affected hip on top and your head propped on a pillow. Keep your feet and knees together and your knees bent. 2. Raise your top knee, but keep your feet together. Do not let your hips roll back. Your legs should open up like a clamshell. 3. Hold for 6 seconds. 4. Slowly lower your knee back down. Rest for 10 seconds. 5. Repeat 8 to 12 times. Follow-up care is a key part of your treatment and safety. Be sure to make and go to all appointments, and call your doctor if you are having problems. It's also a good idea to know your test results and keep a list of the medicines you take. Where can you learn more? Go to http://bambi-krishna.info/. Enter I561 in the search box to learn more about \"Hip Bursitis: Exercises. \" Current as of: June 26, 2019 Content Version: 12.2 © 1763-8435 [employer]. Care instructions adapted under license by [employer] (which disclaims liability or warranty for this information). If you have questions about a medical condition or this instruction, always ask your healthcare professional. [address] any warranty or liability for your use of this information.

## 2019-09-22 LAB
ALBUMIN SERPL-MCNC: 4.3 G/DL (ref 3.6–4.8)
ALBUMIN/GLOB SERPL: 2.5 {RATIO} (ref 1.2–2.2)
ALP SERPL-CCNC: 67 IU/L (ref 39–117)
ALT SERPL-CCNC: 24 IU/L (ref 0–44)
AST SERPL-CCNC: 18 IU/L (ref 0–40)
BILIRUB SERPL-MCNC: 0.5 MG/DL (ref 0–1.2)
BUN SERPL-MCNC: 31 MG/DL (ref 8–27)
BUN/CREAT SERPL: 39 (ref 10–24)
CALCIUM SERPL-MCNC: 8.9 MG/DL (ref 8.6–10.2)
CHLORIDE SERPL-SCNC: 103 MMOL/L (ref 96–106)
CHOLEST SERPL-MCNC: 137 MG/DL (ref 100–199)
CK SERPL-CCNC: 174 U/L (ref 24–204)
CO2 SERPL-SCNC: 23 MMOL/L (ref 20–29)
CREAT SERPL-MCNC: 0.79 MG/DL (ref 0.76–1.27)
GLOBULIN SER CALC-MCNC: 1.7 G/DL (ref 1.5–4.5)
GLUCOSE SERPL-MCNC: 103 MG/DL (ref 65–99)
HDLC SERPL-MCNC: 64 MG/DL
INTERPRETATION, 910389: NORMAL
LDLC SERPL CALC-MCNC: 60 MG/DL (ref 0–99)
POTASSIUM SERPL-SCNC: 4.5 MMOL/L (ref 3.5–5.2)
PROT SERPL-MCNC: 6 G/DL (ref 6–8.5)
SODIUM SERPL-SCNC: 140 MMOL/L (ref 134–144)
TRIGL SERPL-MCNC: 64 MG/DL (ref 0–149)
VLDLC SERPL CALC-MCNC: 13 MG/DL (ref 5–40)

## 2019-09-23 RX ORDER — METOPROLOL SUCCINATE 25 MG/1
TABLET, EXTENDED RELEASE ORAL
Qty: 90 TAB | Refills: 1 | Status: SHIPPED | OUTPATIENT
Start: 2019-09-23 | End: 2019-09-24 | Stop reason: SDUPTHER

## 2019-09-23 NOTE — TELEPHONE ENCOUNTER
Requested Prescriptions     Signed Prescriptions Disp Refills    metoprolol succinate (TOPROL-XL) 25 mg XL tablet 90 Tab 1     Sig: TAKE 1 TABLET DAILY     Authorizing Provider: Andreina Patiño     Ordering User: Luh Lutz       Last office visit 4/23/19    Per Dr. Nerissa Alpers   Date Time Provider Harini Garcia   4/28/2020  1:20 PM Simon Mcnulty  [address]

## 2019-09-23 NOTE — PROGRESS NOTES
Dear  Lizzy Anastasia,    I wanted to follow up on your recent test results: Your labs are normal. It is unlikely that your statin is causing your muscle aches. Continue the exercises and keep me updated on your symptoms. Please let me know if you have any questions.

## 2019-09-24 RX ORDER — METOPROLOL SUCCINATE 25 MG/1
TABLET, EXTENDED RELEASE ORAL
Qty: 90 TAB | Refills: 1 | Status: SHIPPED | OUTPATIENT
Start: 2019-09-24 | End: 2020-03-19

## 2019-09-24 NOTE — TELEPHONE ENCOUNTER
Patient stated that the prescription was sent to [facility] but he would rather it go to [facility] (on file) for a 90 day supply.

## 2019-09-24 NOTE — TELEPHONE ENCOUNTER
Patient has recently been diagnosed with cancer and need to know what test did   [facility] take to confirm that she was allergic to steroids. She will need to provide this information to the specialist as soon as possible. Patient has an appointment at [facility] today and can come up to the office to discuss. Thanks. Requested Prescriptions     Signed Prescriptions Disp Refills    metoprolol succinate (TOPROL-XL) 25 mg XL tablet 90 Tab 1     Sig: TAKE 1 TABLET DAILY     Authorizing Provider: Blaise Burch     Ordering User: Yanet Davila       Last office visit 4/23/19    Per Dr. Sandy Hernandez   Date Time Provider [facility]   4/28/2020  1:20 PM Daniel Newman  [address]

## 2019-10-28 RX ORDER — ATORVASTATIN CALCIUM 10 MG/1
TABLET, FILM COATED ORAL
Qty: 90 TAB | Refills: 1 | Status: SHIPPED | OUTPATIENT
Start: 2019-10-28 | End: 2020-04-24

## 2020-03-19 RX ORDER — METOPROLOL SUCCINATE 25 MG/1
TABLET, EXTENDED RELEASE ORAL
Qty: 90 TAB | Refills: 1 | Status: SHIPPED | OUTPATIENT
Start: 2020-03-19 | End: 2020-09-11

## 2020-03-19 NOTE — TELEPHONE ENCOUNTER
Requested Prescriptions     Signed Prescriptions Disp Refills    metoprolol succinate (TOPROL-XL) 25 mg XL tablet 90 Tab 1     Sig: TAKE 1 TABLET DAILY     Authorizing Provider: Roxie Browning     Ordering User: Lee Li       Last office visit 4/23/19    Per Dr. Caron Mijares   Date Time Provider [facility]   5/6/2020  2:00 PM Flor Rae  [address]

## 2020-04-24 RX ORDER — ATORVASTATIN CALCIUM 10 MG/1
TABLET, FILM COATED ORAL
Qty: 90 TAB | Refills: 1 | Status: SHIPPED | OUTPATIENT
Start: 2020-04-24 | End: 2020-10-12

## 2020-05-06 ENCOUNTER — VIRTUAL VISIT (OUTPATIENT)
Dept: CARDIOLOGY CLINIC | Age: 65
End: 2020-05-06

## 2020-05-06 ENCOUNTER — TELEPHONE (OUTPATIENT)
Dept: CARDIOLOGY CLINIC | Age: 65
End: 2020-05-06

## 2020-05-06 VITALS — SYSTOLIC BLOOD PRESSURE: 124 MMHG | DIASTOLIC BLOOD PRESSURE: 77 MMHG | HEART RATE: 58 BPM

## 2020-05-06 DIAGNOSIS — I10 BENIGN ESSENTIAL HTN: ICD-10-CM

## 2020-05-06 DIAGNOSIS — I48.0 PAROXYSMAL A-FIB (HCC): Primary | ICD-10-CM

## 2020-05-06 DIAGNOSIS — Z79.01 CHRONIC ANTICOAGULATION: ICD-10-CM

## 2020-05-06 DIAGNOSIS — E78.2 MIXED HYPERLIPIDEMIA: ICD-10-CM

## 2020-05-06 NOTE — TELEPHONE ENCOUNTER
----- Message from Carlos Kumar MD sent at 5/6/2020  1:52 PM EDT -----  Follow up in 3 months. Please stop aspirin and give script for 5 mg eliquis bid to price out; coumadin if too expensive.  thx

## 2020-05-06 NOTE — TELEPHONE ENCOUNTER
Scheduled patient for a follow up appointment. Pt to call insurance company to find out how much Eliquis will cost. Pt to call back and let me know what he plans to do. Pt verbalized understanding and denies any further questions at this time.

## 2020-05-06 NOTE — PROGRESS NOTES
VIRTUAL VISIT DOCUMENTATION     Pursuant to the emergency declaration under the [facility], [facility] waiver authority and the [employer] and [employer] Act, this Virtual  Visit was conducted, with patient's consent, to reduce the patient's risk of exposure to COVID-19 and provide continuity of care for an established patient. Services were provided through a video synchronous discussion virtually to substitute for in-person clinic visit. CHIEF COMPLAINT      Hafsa Platt is a 72 y.o. male who was seen by synchronous (real-time) audio-video technology on 5/6/2020. HISTORY OF PRESENTING Elder Olivera. is a 72 y.o. male     Mr. Vonda Quiroz is a 71 yo M with hypertension, borderline hyperlipidemia seen in past for an abnormal EKG with Q waves in V1 and V2 with question of old anteroseptal infarct. Echo 2015 was normal with no infarct. Since his last visit, he continues to do well. He denies any chest pain, shortness of breath, palpitations, lightheadedness or dizziness. He has been compliant with his medications. His blood pressure and heart rates have been well controlled. Soc hx. No tobacco.  Fam hx. No early CAD. Assessment and Plan:    1. Paroxysmal atrial fibrillation. Stable on beta blocker. Echocardiogram and stress test in the past were normal.    2. Chronic anticoagulation. His CHADS VASC score is significant now with his age and hypertension. Discussed the benefits and risks of oral anticoagulation and would recommend anticoagulation with Eliquis 5 mg twice a day if it is not cost prohibitive. He will stop aspirin. If it is cost prohibitive, would have him start Coumadin. Will tentatively have him follow back in three to six months. 3. Mixed hyperlipidemia. Working on lifestyle management. 4. Essential hypertension. Blood pressure is controlled and no changes made. ASSESSMENT/PLAN:         We discussed the expected course, resolution and complications of the diagnosis(es) in detail. Medication risks, benefits, costs, interactions, and alternatives were discussed as indicated. I advised him to contact the office if his condition worsens, changes or fails to improve as anticipated. He expressed understanding with the diagnosis(es) and plan       ACTIVE PROBLEM LIST     Patient Active Problem List    Diagnosis Date Noted    Paroxysmal A-fib ([address] 04/19/2017    Benign essential HTN 04/19/2017    Mixed hyperlipidemia 04/19/2017    Lumbar stenosis     History of BPH     IBS (irritable bowel syndrome)     History of acoustic neuroma 01/01/2012    Diverticulosis 01/01/2006    Colon polyp 01/01/2006           PAST MEDICAL HISTORY     Past Medical History:   Diagnosis Date    Acoustic neuroma [facility]) 2012    R  Marlon/ns    Colonic polyps 2006    Osorio/gi    Diverticulosis 2006    Encounter for screening colonoscopy 07/11/2018    Dr. Marty Garrett - repeat in 3 years    HTN, goal below 150/90     Hypertrophy of prostate without urinary obstruction and other lower urinary tract symptoms (LUTS)     Dorado/uro    IBS (irritable bowel syndrome)     Lumbar stenosis     severe  Van/os    Other and unspecified hyperlipidemia 7/11    Paroxysmal atrial fibrillation (HCC)     ASA for anticoagulation. Beta blocker.     Unstable angina (HCC)     negative stress test 10/2016           PAST SURGICAL HISTORY     Past Surgical History:   Procedure Laterality Date    BIOPSY PROSTATE  2006    HX CHOLECYSTECTOMY  2004    HX HERNIA REPAIR  1996          ALLERGIES     Allergies   Allergen Reactions    Ciprofloxacin Myalgia    Codeine Other (comments)     Since allergy to hydrocodone shouldn't take codeine    Hydrocodone Shortness of Breath          FAMILY HISTORY     Family History   Problem Relation Age of Onset    Diabetes Mother     Hypertension Mother     Cancer Mother esophageal    Diabetes Father     Hypertension Father     Diabetes Brother     Hypertension Brother     Stroke Maternal Grandfather     negative for cardiac disease       SOCIAL HISTORY     Social History     Socioeconomic History    Marital status: SINGLE     Spouse name: Not on file    Number of children: Not on file    Years of education: Not on file    Highest education level: Not on file   Occupational History    Occupation:     Tobacco Use    Smoking status: Never Smoker    Smokeless tobacco: Never Used   Substance and Sexual Activity    Alcohol use: Yes     Alcohol/week: 0.0 standard drinks     Comment: occasional    Drug use: No    Sexual activity: Not Currently   Other Topics Concern    Special Diet Yes     Comment: healthy    Exercise Yes     Comment: walks daily         MEDICATIONS     Current Outpatient Medications   Medication Sig    atorvastatin (LIPITOR) 10 mg tablet TAKE 1 TABLET DAILY    metoprolol succinate (TOPROL-XL) 25 mg XL tablet TAKE 1 TABLET DAILY    ALPRAZolam (XANAX) 0.5 mg tablet Take 1 tab by mouth once before flight for anxiety    olmesartan (BENICAR) 40 mg tablet TAKE 1 TABLET DAILY    aspirin delayed-release 81 mg tablet Take 81 mg by mouth daily.  dutaseride (AVODART) 0.5 mg capsule Take 0.5 mg by mouth daily. No current facility-administered medications for this visit. I have reviewed the nurses notes, vitals, problem list, allergy list, medical history, family, social history and medications. REVIEW OF SYMPTOMS     Constitutional: Negative for fever, chills, malaise/fatigue and diaphoresis. Respiratory: Negative for cough, hemoptysis, sputum production, shortness of breath and wheezing. Cardiovascular: Negative for chest pain, palpitations, orthopnea, claudication, leg swelling and PND. Gastrointestinal: Negative for heartburn, nausea, vomiting, blood in stool and melena.   Genitourinary: Negative for dysuria and flank pain.  Musculoskeletal: Negative for joint pain and back pain. Skin: Negative for rash. Neurological: Negative for focal weakness, seizures, loss of consciousness, weakness and headaches. Endo/Heme/Allergies: Negative for abnormal bleeding. Psychiatric/Behavioral: Negative for memory loss. PHYSICAL EXAMINATION      Due to this being a TeleHealth evaluation, many elements of the physical examination are unable to be assessed. General: Well developed, in no acute distress, cooperative and alert  HEENT: Pupils equal/round. No marked JVD visible on video. Respiratory: No audible wheezing, no signs of respiratory distress, lips non cyanotic  Extremities:  No edema  Neuro: A&Ox3, speech clear, no facial droop, answering questions appropriately  Skin: Skin color is normal. No rashes or lesions. Non diaphoretic on visible skin during exam       DIAGNOSTIC DATA      No specialty comments available. LABORATORY DATA      Lab Results   Component Value Date/Time    WBC 5.5 10/10/2016 08:19 AM    HGB 16.7 10/10/2016 08:19 AM    HCT 51.2 (H) 10/10/2016 08:19 AM    PLATELET 891 06/44/1403 08:19 AM    MCV 89 10/10/2016 08:19 AM      Lab Results   Component Value Date/Time    Sodium 140 09/21/2019 09:15 AM    Potassium 4.5 09/21/2019 09:15 AM    Chloride 103 09/21/2019 09:15 AM    CO2 23 09/21/2019 09:15 AM    Anion gap 11 03/29/2010 10:01 AM    Glucose 103 (H) 09/21/2019 09:15 AM    BUN 31 (H) 09/21/2019 09:15 AM    Creatinine 0.79 09/21/2019 09:15 AM    BUN/Creatinine ratio 39 (H) 09/21/2019 09:15 AM    GFR est  09/21/2019 09:15 AM    GFR est non-AA 95 09/21/2019 09:15 AM    Calcium 8.9 09/21/2019 09:15 AM    Bilirubin, total 0.5 09/21/2019 09:15 AM    AST (SGOT) 18 09/21/2019 09:15 AM    Alk.  phosphatase 67 09/21/2019 09:15 AM    Protein, total 6.0 09/21/2019 09:15 AM    Albumin 4.3 09/21/2019 09:15 AM    Globulin 2.9 03/29/2010 10:01 AM    A-G Ratio 2.5 (H) 09/21/2019 09:15 AM    ALT (SGPT) 24 09/21/2019 09:15 AM            Patient was made aware and verbalized understanding that an appointment will be scheduled for them for a virtual visit and/or office visit within the above time frame. Patient understanding his/her responsibility to call and change time/date if he/she so chooses. Greater than 20 minutes was spent in direct video patient care, planning and chart review. This visit was conducted using [employer] [facility] telemedicine services.        David Levine MD    [address], [address] [place] [address],[address]  Stella Ly  [phone] / [phone] Fax

## 2020-05-21 ENCOUNTER — VIRTUAL VISIT (OUTPATIENT)
Dept: CARDIOLOGY CLINIC | Age: 65
End: 2020-05-21

## 2020-05-21 VITALS
HEIGHT: 71 IN | SYSTOLIC BLOOD PRESSURE: 123 MMHG | BODY MASS INDEX: 24.78 KG/M2 | HEART RATE: 61 BPM | WEIGHT: 177 LBS | DIASTOLIC BLOOD PRESSURE: 73 MMHG

## 2020-05-21 DIAGNOSIS — Z79.01 CHRONIC ANTICOAGULATION: ICD-10-CM

## 2020-05-21 DIAGNOSIS — E78.2 MIXED HYPERLIPIDEMIA: ICD-10-CM

## 2020-05-21 DIAGNOSIS — I10 BENIGN ESSENTIAL HTN: ICD-10-CM

## 2020-05-21 DIAGNOSIS — I48.0 PAROXYSMAL A-FIB (HCC): Primary | ICD-10-CM

## 2020-05-21 NOTE — PROGRESS NOTES
VIRTUAL VISIT DOCUMENTATION     Pursuant to the emergency declaration under the [facility], [facility] waiver authority and the [employer] Act, this Virtual  Visit was conducted, with patient's consent, to reduce the patient's risk of exposure to COVID-19 and provide continuity of care for an established patient. Services were provided through a video synchronous discussion virtually to substitute for in-person clinic visit. CHIEF COMPLAINT      Priti Pace is a 72 y.o. male who was seen by synchronous (real-time) audio-video technology on 5/21/2020. HISTORY OF PRESENTING ILLNESS     Mr. Shay Garcia is a 73 yo M with hypertension, borderline hyperlipidemia seen in past for an abnormal EKG with Q waves in V1 and V2 with question of old anteroseptal infarct. Echo 2015 was normal with no infarct. On his last visit, due to his DEANDRE VASC score being significant, talked about [address] [place] [address] Road with eliquis. He still has a lot of reservations about starting anticoagulation. Much of his concern comes from having fear of the sight of blood. He has never been able to give blood, because the sight of it makes him feel like he could pass out. Even discussing blood, he feels faint. If he does have this sensation, he sits/lays down it takes time for him to recover. From a symptom standpoint, he has been doing well. He denies any chest pain, shortness of breath, palpitations, lightheadedness or dizziness. He has been compliant with his medications. His blood pressure and heart rates have been well controlled. Soc hx. No tobacco.  Fam hx. No early CAD. Assessment and Plan:    1. Paroxysmal atrial fibrillation. Stable on beta blocker. Echocardiogram and stress test in the past were normal.    2. Chronic anticoagulation. His CHADS VASC score is now significant with his age and hypertension.   Discussed the benefits and risks of oral anticoagulation at length. I do think it is reasonable for him to get more information and think about this more. He is retiring in August and thinks he may be ready to consider this at that time. Follow up in August and reassess. 3. Mixed hyperlipidemia. Working on lifestyle management. 4. Essential hypertension. Blood pressure is controlled and no changes made. ASSESSMENT/PLAN:         We discussed the expected course, resolution and complications of the diagnosis(es) in detail. Medication risks, benefits, costs, interactions, and alternatives were discussed as indicated. I advised him to contact the office if his condition worsens, changes or fails to improve as anticipated. He expressed understanding with the diagnosis(es) and plan       ACTIVE PROBLEM LIST     Patient Active Problem List    Diagnosis Date Noted    Paroxysmal A-fib ([address] 04/19/2017    Benign essential HTN 04/19/2017    Mixed hyperlipidemia 04/19/2017    Lumbar stenosis     History of BPH     IBS (irritable bowel syndrome)     History of acoustic neuroma 01/01/2012    Diverticulosis 01/01/2006    Colon polyp 01/01/2006           PAST MEDICAL HISTORY     Past Medical History:   Diagnosis Date    Acoustic neuroma [facility]) 2012    R  Marlon/ns    Colonic polyps 2006    Osorio/gi    Diverticulosis 2006    Encounter for screening colonoscopy 07/11/2018    Dr. Wilcox Less - repeat in 3 years    HTN, goal below 150/90     Hypertrophy of prostate without urinary obstruction and other lower urinary tract symptoms (LUTS)     Dorado/uro    IBS (irritable bowel syndrome)     Lumbar stenosis     severe  Van/os    Other and unspecified hyperlipidemia 7/11    Paroxysmal atrial fibrillation (HCC)     ASA for anticoagulation. Beta blocker.     Unstable angina ([address]     negative stress test 10/2016           PAST SURGICAL HISTORY     Past Surgical History:   Procedure Laterality Date    BIOPSY PROSTATE  2006    HX CHOLECYSTECTOMY 2004    HX HERNIA REPAIR  1996          ALLERGIES     Allergies   Allergen Reactions    Ciprofloxacin Myalgia    Codeine Other (comments)     Since allergy to hydrocodone shouldn't take codeine    Hydrocodone Shortness of Breath          FAMILY HISTORY     Family History   Problem Relation Age of Onset    Diabetes Mother     Hypertension Mother     Cancer Mother         esophageal    Diabetes Father     Hypertension Father     Diabetes Brother     Hypertension Brother     Stroke Maternal Grandfather     negative for cardiac disease       SOCIAL HISTORY     Social History     Socioeconomic History    Marital status: SINGLE     Spouse name: Not on file    Number of children: Not on file    Years of education: Not on file    Highest education level: Not on file   Occupational History    Occupation:     Tobacco Use    Smoking status: Never Smoker    Smokeless tobacco: Never Used   Substance and Sexual Activity    Alcohol use: Yes     Alcohol/week: 0.0 standard drinks     Comment: occasional    Drug use: No    Sexual activity: Not Currently   Other Topics Concern    Special Diet Yes     Comment: healthy    Exercise Yes     Comment: walks daily         MEDICATIONS     Current Outpatient Medications   Medication Sig    apixaban (ELIQUIS) 5 mg tablet Take 1 Tab by mouth two (2) times a day.  atorvastatin (LIPITOR) 10 mg tablet TAKE 1 TABLET DAILY    metoprolol succinate (TOPROL-XL) 25 mg XL tablet TAKE 1 TABLET DAILY    ALPRAZolam (XANAX) 0.5 mg tablet Take 1 tab by mouth once before flight for anxiety    olmesartan (BENICAR) 40 mg tablet TAKE 1 TABLET DAILY    aspirin delayed-release 81 mg tablet Take 81 mg by mouth daily.  dutaseride (AVODART) 0.5 mg capsule Take 0.5 mg by mouth daily. No current facility-administered medications for this visit.         I have reviewed the nurses notes, vitals, problem list, allergy list, medical history, family, social history and medications. REVIEW OF SYMPTOMS     Constitutional: Negative for fever, chills, malaise/fatigue and diaphoresis. Respiratory: Negative for cough, hemoptysis, sputum production, shortness of breath and wheezing. Cardiovascular: Negative for chest pain, palpitations, orthopnea, claudication, leg swelling and PND. Gastrointestinal: Negative for heartburn, nausea, vomiting, blood in stool and melena. Genitourinary: Negative for dysuria and flank pain. Musculoskeletal: Negative for joint pain and back pain. Skin: Negative for rash. Neurological: Negative for focal weakness, seizures, loss of consciousness, weakness and headaches. Endo/Heme/Allergies: Negative for abnormal bleeding. Psychiatric/Behavioral: Negative for memory loss. PHYSICAL EXAMINATION      Due to this being a TeleHealth evaluation, many elements of the physical examination are unable to be assessed. General: Well developed, in no acute distress, cooperative and alert  HEENT: Pupils equal/round. No marked JVD visible on video. Respiratory: No audible wheezing, no signs of respiratory distress, lips non cyanotic  Extremities:  No edema  Neuro: A&Ox3, speech clear, no facial droop, answering questions appropriately  Skin: Skin color is normal. No rashes or lesions. Non diaphoretic on visible skin during exam       DIAGNOSTIC DATA      No specialty comments available.        LABORATORY DATA      Lab Results   Component Value Date/Time    WBC 5.5 10/10/2016 08:19 AM    HGB 16.7 10/10/2016 08:19 AM    HCT 51.2 (H) 10/10/2016 08:19 AM    PLATELET 276 32/96/5635 08:19 AM    MCV 89 10/10/2016 08:19 AM      Lab Results   Component Value Date/Time    Sodium 140 09/21/2019 09:15 AM    Potassium 4.5 09/21/2019 09:15 AM    Chloride 103 09/21/2019 09:15 AM    CO2 23 09/21/2019 09:15 AM    Anion gap 11 03/29/2010 10:01 AM    Glucose 103 (H) 09/21/2019 09:15 AM    BUN 31 (H) 09/21/2019 09:15 AM    Creatinine 0.79 09/21/2019 09:15 AM BUN/Creatinine ratio 39 (H) 09/21/2019 09:15 AM    GFR est  09/21/2019 09:15 AM    GFR est non-AA 95 09/21/2019 09:15 AM    Calcium 8.9 09/21/2019 09:15 AM    Bilirubin, total 0.5 09/21/2019 09:15 AM    AST (SGOT) 18 09/21/2019 09:15 AM    Alk. phosphatase 67 09/21/2019 09:15 AM    Protein, total 6.0 09/21/2019 09:15 AM    Albumin 4.3 09/21/2019 09:15 AM    Globulin 2.9 03/29/2010 10:01 AM    A-G Ratio 2.5 (H) 09/21/2019 09:15 AM    ALT (SGPT) 24 09/21/2019 09:15 AM            Patient was made aware and verbalized understanding that an appointment will be scheduled for them for a virtual visit and/or office visit within the above time frame. Patient understanding his/her responsibility to call and change time/date if he/she so chooses. Greater than 20 minutes was spent in direct video patient care, planning and chart review. This visit was conducted using [employer] telemedicine services.        Deyvi Weems MD    [address] [facility]  [address] [place] , [address] [address],[address]  [facility], [facility]  [phone] / [phone] Fax

## 2020-06-16 RX ORDER — OLMESARTAN MEDOXOMIL 40 MG/1
TABLET ORAL
Qty: 90 TAB | Refills: 3 | Status: SHIPPED | OUTPATIENT
Start: 2020-06-16 | End: 2021-02-02 | Stop reason: SDUPTHER

## 2020-06-16 NOTE — TELEPHONE ENCOUNTER
Requested Prescriptions     Signed Prescriptions Disp Refills    olmesartan (BENICAR) 40 mg tablet 90 Tab 3     Sig: TAKE 1 TABLET DAILY     Authorizing Provider: Ashlee Raphael     Ordering User: Quinton Melendez       Last office visit 5/21/20    Per Dr. Phillip Smiley   Date Time Provider Harini Garcia   8/5/2020  2:40 PM Valdemar Varner  [address]

## 2020-09-11 RX ORDER — METOPROLOL SUCCINATE 25 MG/1
TABLET, EXTENDED RELEASE ORAL
Qty: 90 TAB | Refills: 0 | Status: SHIPPED | OUTPATIENT
Start: 2020-09-11 | End: 2020-12-01

## 2020-10-08 ENCOUNTER — OFFICE VISIT (OUTPATIENT)
Dept: FAMILY MEDICINE CLINIC | Age: 65
End: 2020-10-08
Payer: MEDICARE

## 2020-10-08 VITALS
BODY MASS INDEX: 25.62 KG/M2 | HEIGHT: 71 IN | WEIGHT: 183 LBS | OXYGEN SATURATION: 98 % | DIASTOLIC BLOOD PRESSURE: 71 MMHG | HEART RATE: 68 BPM | RESPIRATION RATE: 18 BRPM | TEMPERATURE: 98.6 F | SYSTOLIC BLOOD PRESSURE: 135 MMHG

## 2020-10-08 DIAGNOSIS — I10 ESSENTIAL HYPERTENSION WITH GOAL BLOOD PRESSURE LESS THAN 150/90: ICD-10-CM

## 2020-10-08 DIAGNOSIS — F41.8 SITUATIONAL ANXIETY: ICD-10-CM

## 2020-10-08 DIAGNOSIS — E78.2 MIXED HYPERLIPIDEMIA: ICD-10-CM

## 2020-10-08 DIAGNOSIS — M48.00 SPINAL STENOSIS, UNSPECIFIED SPINAL REGION: ICD-10-CM

## 2020-10-08 DIAGNOSIS — Z00.00 ENCOUNTER FOR MEDICARE ANNUAL WELLNESS EXAM: Primary | ICD-10-CM

## 2020-10-08 DIAGNOSIS — Z23 ENCOUNTER FOR IMMUNIZATION: ICD-10-CM

## 2020-10-08 PROCEDURE — 90670 PCV13 VACCINE IM: CPT

## 2020-10-08 PROCEDURE — 99214 OFFICE O/P EST MOD 30 MIN: CPT | Performed by: FAMILY MEDICINE

## 2020-10-08 PROCEDURE — G0439 PPPS, SUBSEQ VISIT: HCPCS | Performed by: FAMILY MEDICINE

## 2020-10-08 PROCEDURE — 90471 IMMUNIZATION ADMIN: CPT

## 2020-10-08 RX ORDER — ALPRAZOLAM 0.5 MG/1
TABLET ORAL
Qty: 10 TAB | Refills: 0 | Status: SHIPPED | OUTPATIENT
Start: 2020-10-08

## 2020-10-08 NOTE — PROGRESS NOTES
Chief Complaint   Patient presents with   [address] [place] [address] Annual Wellness Visit     1. Have you been to the ER, urgent care clinic since your last visit? Hospitalized since your last visit? No    2. Have you seen or consulted any other health care providers outside of the [address] [place] [address] since your last visit? Include any pap smears or colon screening. No     Patient given Prevnar 13 in left deltoid. Tolerated well.

## 2020-10-08 NOTE — PROGRESS NOTES
Patient Name: Carmelita Guajardo MRN: [id number]    Anaya Noyola is a 72 y.o. male who presents with the following:     Colon Cancer Screening: up to date. Hep C: neg   PSA: up to date  Lab Results   Component Value Date/Time    Prostate Specific Ag 1.8 09/16/2020 07:54 AM    Prostate Specific Ag 1.9 05/17/2019 08:59 AM    Prostate Specific Ag 2.7 01/05/2018 08:26 AM     CAD risk factors:  HTN: wnl on meds  BP Readings from Last 3 Encounters:   10/08/20 135/71   05/21/20 123/73   05/06/20 124/77     Lipid: on statin  Lab Results   Component Value Date/Time    Cholesterol, total 146 09/16/2020 07:54 AM    HDL Cholesterol 54 09/16/2020 07:54 AM    LDL, calculated 60 09/21/2019 09:15 AM    LDL Chol Calc ([facility]) 78 09/16/2020 07:54 AM    VLDL, calculated 13 09/21/2019 09:15 AM    VLDL Cholesterol Juan Ramon 14 09/16/2020 07:54 AM    Triglyceride 71 09/16/2020 07:54 AM    CHOL/HDL Ratio 3.6 03/29/2010 10:01 AM     DM: normal  Lab Results   Component Value Date/Time    Hemoglobin A1c 5.6 09/16/2020 07:54 AM     Has occasional lower back pain with cramping in bilateral thighs. Last flare up was in August which lasted for several weeks. He does have a hx of severe spinal stenosis per MRI in 2011 which he has managed with weight loss and exercise. Has a flight planned for next January to [place]; request refill of Xanax prn flight anxiety. Review of Systems   Constitutional: Negative for fever, malaise/fatigue and weight loss. Respiratory: Negative for cough, hemoptysis, shortness of breath and wheezing. Cardiovascular: Negative for chest pain, palpitations, leg swelling and PND. Gastrointestinal: Negative for abdominal pain, constipation, diarrhea, nausea and vomiting. Musculoskeletal: Positive for back pain. The patient's medications, allergies, past medical history, surgical history, family history and social history were reviewed and updated where appropriate.       Prior to Admission medications    Medication Sig Start Date End Date Taking? Authorizing Provider   metoprolol succinate (TOPROL-XL) 25 mg XL tablet TAKE 1 TABLET DAILY 9/11/20  Yes Taya Cook MD   olmesartan (BENICAR) 40 mg tablet TAKE 1 TABLET DAILY 6/16/20  Yes Taya Cook MD   atorvastatin (LIPITOR) 10 mg tablet TAKE 1 TABLET DAILY 4/24/20  Yes Kimani Todd, NP   ALPRAZolam Leta Buck) 0.5 mg tablet Take 1 tab by mouth once before flight for anxiety 2/7/20  Yes Alison Bill MD   aspirin delayed-release 81 mg tablet Take 81 mg by mouth daily. Yes Provider, Historical   dutaseride (AVODART) 0.5 mg capsule Take 0.5 mg by mouth daily. Yes Provider, Historical   apixaban (ELIQUIS) 5 mg tablet Take 1 Tab by mouth two (2) times a day. 5/6/20   Taya Cook MD       Allergies   Allergen Reactions    Ciprofloxacin Myalgia    Codeine Other (comments)     Since allergy to hydrocodone shouldn't take codeine    Hydrocodone Shortness of Breath         OBJECTIVE    Visit Vitals  /71 (BP 1 Location: Right arm, BP Patient Position: Sitting)   Pulse 68   Temp 98.6 °F (37 °C) (Temporal)   Resp 18   Ht 5' 11\" (1.803 m)   Wt 183 lb (83 kg)   SpO2 98%   BMI 25.52 kg/m²       Physical Exam  Vitals signs and nursing note reviewed. Constitutional:       General: He is not in acute distress. Appearance: He is not diaphoretic. Eyes:      Conjunctiva/sclera: Conjunctivae normal.      Pupils: Pupils are equal, round, and reactive to light. Cardiovascular:      Rate and Rhythm: Normal rate and regular rhythm. Heart sounds: Normal heart sounds. No murmur. No friction rub. No gallop. Pulmonary:      Effort: Pulmonary effort is normal. No respiratory distress. Breath sounds: Normal breath sounds. No wheezing. Skin:     General: Skin is warm and dry. Neurological:      Mental Status: He is alert. ASSESSMENT AND PLAN  Christian Backer. is a 72 y.o. male who presents today for:    1.  Encounter for Medicare annual wellness exam    2. Spinal stenosis, unspecified spinal region  - XR SPINE LUMB 2 OR 3 V; Future    3. Situational anxiety  - ALPRAZolam (XANAX) 0.5 mg tablet; Take 1 tab by mouth once before flight for anxiety  Dispense: 10 Tab; Refill: 0    4. Essential hypertension with goal blood pressure less than 150/90  Stable, continue current treatment. 5. Mixed hyperlipidemia  Stable, continue current treatment. 6. Encounter for immunization  - PNEUMOCOCCAL CONJ VACCINE 13 VALENT IM  - ADMIN PNEUMOCOCCAL VACCINE       Medications Discontinued During This Encounter   Medication Reason    apixaban (ELIQUIS) 5 mg tablet     ALPRAZolam (XANAX) 0.5 mg tablet Reorder       Follow-up and Dispositions    · Return in about 6 months (around 4/8/2021) for HTN follow up. Treatment risks/benefits/costs/interactions/alternatives discussed with patient. Advised patient to call back or return to office if symptoms worsen/change/persist. If patient cannot reach us or should anything more severe/urgent arise he/she should proceed directly to the nearest emergency department. Discussed expected course/resolution/complications of diagnosis in detail with patient. Patient expressed understanding with the diagnosis and plan. Camille Magana M.D.

## 2020-10-08 NOTE — PROGRESS NOTES
This is the Subsequent Medicare Annual Wellness Exam, performed 12 months or more after the Initial AWV or the last Subsequent AWV    I have reviewed the patient's medical history in detail and updated the computerized patient record. History     Patient Active Problem List   Diagnosis Code    Lumbar stenosis M48.061    History of BPH Z87.438    IBS (irritable bowel syndrome) K58.9    Diverticulosis K57.90    Colon polyp K63.5    History of acoustic neuroma Z86.018    Paroxysmal A-fib ([facility] I48.0    Benign essential HTN I10    Mixed hyperlipidemia E78.2     Past Medical History:   Diagnosis Date    Acoustic neuroma ([facility] 2012    R  Marlon/ns    Colonic polyps 2006    Osorio/gi    Diverticulosis 2006    Encounter for screening colonoscopy 07/11/2018    Dr. Wilbur Wang - repeat in 3 years    HTN, goal below 150/90     Hypertrophy of prostate without urinary obstruction and other lower urinary tract symptoms (LUTS)     Dorado/uro    IBS (irritable bowel syndrome)     Lumbar stenosis     severe  Van/os    Other and unspecified hyperlipidemia 7/11    Paroxysmal atrial fibrillation (HCC)     ASA for anticoagulation. Beta blocker.  Unstable angina (HCC)     negative stress test 10/2016      Past Surgical History:   Procedure Laterality Date    BIOPSY PROSTATE  2006    HX CHOLECYSTECTOMY  2004    HX HERNIA REPAIR  1996     Current Outpatient Medications   Medication Sig Dispense Refill    ALPRAZolam (XANAX) 0.5 mg tablet Take 1 tab by mouth once before flight for anxiety 10 Tab 0    metoprolol succinate (TOPROL-XL) 25 mg XL tablet TAKE 1 TABLET DAILY 90 Tab 0    olmesartan (BENICAR) 40 mg tablet TAKE 1 TABLET DAILY 90 Tab 3    atorvastatin (LIPITOR) 10 mg tablet TAKE 1 TABLET DAILY 90 Tab 1    aspirin delayed-release 81 mg tablet Take 81 mg by mouth daily.  dutaseride (AVODART) 0.5 mg capsule Take 0.5 mg by mouth daily.        Allergies   Allergen Reactions    Ciprofloxacin Myalgia    Codeine Other (comments)     Since allergy to hydrocodone shouldn't take codeine    Hydrocodone Shortness of Breath       Family History   Problem Relation Age of Onset    Diabetes Mother     Hypertension Mother     Cancer Mother         esophageal    Diabetes Father     Hypertension Father     Diabetes Brother     Hypertension Brother     Stroke Maternal Grandfather      Social History     Tobacco Use    Smoking status: Never Smoker    Smokeless tobacco: Never Used   Substance Use Topics    Alcohol use: Yes     Alcohol/week: 0.0 standard drinks     Comment: occasional       Depression Risk Factor Screening:     3 most recent PHQ Screens 10/8/2020   Little interest or pleasure in doing things Not at all   Feeling down, depressed, irritable, or hopeless Not at all   Total Score PHQ 2 0       Alcohol Risk Screen   Do you average more than 1 drink per night or more than 7 drinks a week: No    In the past three months have you have had more than 4 drinks containing alcohol on one occasion: No        Functional Ability and Level of Safety:   Hearing: Hearing is good. Activities of Daily Living: The home contains: no safety equipment. Patient does total self care     Ambulation: with no difficulty     Fall Risk:  Fall Risk Assessment, last 12 mths 10/8/2020   Able to walk? Yes   Fall in past 12 months? No     Abuse Screen:  Patient is not abused       Cognitive Screening   Has your family/caregiver stated any concerns about your memory: no         Patient Care Team   Patient Care Team:  Juwan Virgen MD as PCP - General (Family Medicine)  Juwan Virgen MD as PCP - [address] [place] [address] Provider  Iraida Hayward MD (Cardiology)  Dean Ozuna MD as Physician (Urology)    Assessment/Plan   Education and counseling provided:  Are appropriate based on today's review and evaluation    Diagnoses and all orders for this visit:    1. Encounter for Medicare annual wellness exam    2.  Spinal stenosis, unspecified spinal region  -     XR SPINE LUMB 2 OR 3 V; Future    3. Situational anxiety  -     ALPRAZolam (XANAX) 0.5 mg tablet; Take 1 tab by mouth once before flight for anxiety    4. Essential hypertension with goal blood pressure less than 150/90    5. Mixed hyperlipidemia    6. Encounter for immunization  -     PNEUMOCOCCAL CONJ VACCINE 13 VALENT IM  -     ADMIN PNEUMOCOCCAL VACCINE        There are no preventive care reminders to display for this patient.

## 2020-10-08 NOTE — PATIENT INSTRUCTIONS
Lumbar Spinal Stenosis: Care Instructions  Your Care Instructions     Stenosis in the spine is a narrowing of the canal that is around the spinal cord and nerve roots in your back. It can happen as part of aging. Sometimes bone and other tissue grow into this canal and press on the nerves that branch out from the spinal cord. This can cause pain, numbness, and weakness. When it happens in the lower part of your back, it is called lumbar spinal stenosis. It can cause problems in the legs, feet, and rear end (buttocks). You may be able to get relief from the symptoms of spinal stenosis by taking pain medicine. Your doctor may suggest physical therapy and exercises to keep your spine strong and flexible. Some people try steroid shots to reduce swelling. If pain and numbness in your legs are still so bad that you cannot do your normal activities, you may need surgery. Follow-up care is a key part of your treatment and safety. Be sure to make and go to all appointments, and call your doctor if you are having problems. It's also a good idea to know your test results and keep a list of the medicines you take. How can you care for yourself at home? · Take an over-the-counter pain medicine. Nonsteroidal anti-inflammatory drugs (NSAIDs) such as ibuprofen or naproxen seem to work best. But if you can't take NSAIDs, you can try acetaminophen. Be safe with medicines. Read and follow all instructions on the label. · Do not take two or more pain medicines at the same time unless the doctor told you to. Many pain medicines have acetaminophen, which is Tylenol. Too much acetaminophen (Tylenol) can be harmful. · Stay at a healthy weight. Being overweight puts extra strain on your spine. · Change positions often when you sit or stand. This can ease pain. It may also reduce pressure on the spinal cord and its nerves. · Avoid doing things that make your symptoms worse.  Walking downhill and standing for a long time may cause pain.  · Stretch and strengthen your back muscles as your doctor or physical therapist recommends. If your doctor says it is okay to do them, these exercises may help. ? Lie on your back with your knees bent. Gently pull one bent knee to your chest. Put that foot back on the floor, and then pull the other knee to your chest.  ? Do pelvic tilts. Lie on your back with your knees bent. Tighten your stomach muscles. Pull your belly button (navel) in and up toward your ribs. You should feel like your back is pressing to the floor and your hips and pelvis are slightly lifting off the floor. Hold for 6 seconds while breathing smoothly. ? Stand with your back flat against a wall. Slowly slide down until your knees are slightly bent. Hold for 10 seconds, then slide back up the wall. · Remove or change anything in your house that may cause you to fall. Keep walkways clear of clutter, electrical cords, and throw rugs. When should you call for help? Call 911 anytime you think you may need emergency care. For example, call if:    · You are unable to move a leg at all. Call your doctor now or seek immediate medical care if:    · You have new or worse symptoms in your legs, belly, or buttocks. Symptoms may include:  ? Numbness or tingling. ? Weakness. ? Pain.     · You lose bladder or bowel control. Watch closely for changes in your health, and be sure to contact your doctor if:    · You have a fever, lose weight, or don't feel well.     · You are not getting better as expected. Where can you learn more? Go to http://www.gray.com/  Enter X327 in the search box to learn more about \"Lumbar Spinal Stenosis: Care Instructions. \"  Current as of: March 2, 2020               Content Version: 12.6  © 5488-5256 [employer]. Care instructions adapted under license by [employer] (which disclaims liability or warranty for this information).  If you have questions about a medical condition or this instruction, always ask your healthcare professional. [place] [address] any warranty or liability for your use of this information.

## 2020-10-09 ENCOUNTER — HOSPITAL ENCOUNTER (OUTPATIENT)
Dept: GENERAL RADIOLOGY | Age: 65
Discharge: HOME | End: 2020-10-09
Payer: COMMERCIAL

## 2020-10-09 DIAGNOSIS — M48.00 SPINAL STENOSIS, UNSPECIFIED SPINAL REGION: ICD-10-CM

## 2020-10-09 PROCEDURE — 72100 X-RAY EXAM L-S SPINE 2/3 VWS: CPT | Performed by: FAMILY MEDICINE

## 2020-10-13 NOTE — PROGRESS NOTES
Dear Mr. Andrea Campos,    I wanted to follow up on your recent test results: Your back xray does confirm severe age related arthritis which has gotten worse compared to the last xray on file.  If your symptoms persist, I recommend seeing a spine orthopedic doctor; let me know if you would like a referral.

## 2020-11-02 NOTE — TELEPHONE ENCOUNTER
Requested Prescriptions     Signed Prescriptions Disp Refills    apixaban (ELIQUIS) 5 mg tablet 60 Tab 5     Sig: Take 1 Tab by mouth two (2) times a day.      Authorizing Provider: Antonietta Quintero     Ordering User: Emely Silva       Per Dr. Narda Armenta   Date Time Provider Harini Garcia   5/12/2021  9:00 AM MD TRINH Sanchez AMB

## 2020-11-04 ENCOUNTER — HOSPITAL ENCOUNTER (EMERGENCY)
Age: 65
Discharge: HOME | End: 2020-11-04
Attending: STUDENT IN AN ORGANIZED HEALTH CARE EDUCATION/TRAINING PROGRAM
Payer: COMMERCIAL

## 2020-11-04 VITALS
OXYGEN SATURATION: 98 % | HEART RATE: 73 BPM | SYSTOLIC BLOOD PRESSURE: 147 MMHG | WEIGHT: 182.76 LBS | BODY MASS INDEX: 24.75 KG/M2 | TEMPERATURE: 98.6 F | DIASTOLIC BLOOD PRESSURE: 88 MMHG | RESPIRATION RATE: 16 BRPM | HEIGHT: 72 IN

## 2020-11-04 DIAGNOSIS — R00.2 PALPITATIONS: Primary | ICD-10-CM

## 2020-11-04 LAB
ANION GAP SERPL CALC-SCNC: 11 MMOL/L (ref 5–15)
ATRIAL RATE: 67 BPM
BASOPHILS # BLD: 0 K/UL (ref 0–0.1)
BASOPHILS NFR BLD: 0 % (ref 0–1)
BUN SERPL-MCNC: 17 MG/DL (ref 6–20)
BUN/CREAT SERPL: 19 (ref 12–20)
CALCIUM SERPL-MCNC: 9.3 MG/DL (ref 8.5–10.1)
CALCULATED P AXIS, ECG09: 53 DEGREES
CALCULATED R AXIS, ECG10: -7 DEGREES
CALCULATED T AXIS, ECG11: 131 DEGREES
CHLORIDE SERPL-SCNC: 103 MMOL/L (ref 97–108)
CO2 SERPL-SCNC: 26 MMOL/L (ref 21–32)
COMMENT, HOLDF: NORMAL
CREAT SERPL-MCNC: 0.9 MG/DL (ref 0.7–1.3)
DIAGNOSIS, 93000: NORMAL
DIFFERENTIAL METHOD BLD: ABNORMAL
EOSINOPHIL # BLD: 0 K/UL (ref 0–0.4)
EOSINOPHIL NFR BLD: 1 % (ref 0–7)
ERYTHROCYTE [DISTWIDTH] IN BLOOD BY AUTOMATED COUNT: 14.1 % (ref 11.5–14.5)
GLUCOSE SERPL-MCNC: 108 MG/DL (ref 65–100)
HCT VFR BLD AUTO: 50 % (ref 36.6–50.3)
HGB BLD-MCNC: 16.5 G/DL (ref 12.1–17)
IMM GRANULOCYTES # BLD AUTO: 0 K/UL (ref 0–0.04)
IMM GRANULOCYTES NFR BLD AUTO: 0 % (ref 0–0.5)
LYMPHOCYTES # BLD: 0.9 K/UL (ref 0.8–3.5)
LYMPHOCYTES NFR BLD: 13 % (ref 12–49)
MCH RBC QN AUTO: 29.6 PG (ref 26–34)
MCHC RBC AUTO-ENTMCNC: 33 G/DL (ref 30–36.5)
MCV RBC AUTO: 89.6 FL (ref 80–99)
MONOCYTES # BLD: 0.5 K/UL (ref 0–1)
MONOCYTES NFR BLD: 8 % (ref 5–13)
NEUTS SEG # BLD: 5.4 K/UL (ref 1.8–8)
NEUTS SEG NFR BLD: 78 % (ref 32–75)
NRBC # BLD: 0 K/UL (ref 0–0.01)
NRBC BLD-RTO: 0 PER 100 WBC
P-R INTERVAL, ECG05: 158 MS
PLATELET # BLD AUTO: 241 K/UL (ref 150–400)
PMV BLD AUTO: 10.9 FL (ref 8.9–12.9)
POTASSIUM SERPL-SCNC: 4.1 MMOL/L (ref 3.5–5.1)
Q-T INTERVAL, ECG07: 376 MS
QRS DURATION, ECG06: 78 MS
QTC CALCULATION (BEZET), ECG08: 397 MS
RBC # BLD AUTO: 5.58 M/UL (ref 4.1–5.7)
SAMPLES BEING HELD,HOLD: NORMAL
SODIUM SERPL-SCNC: 140 MMOL/L (ref 136–145)
TROPONIN I SERPL-MCNC: <0.05 NG/ML
VENTRICULAR RATE, ECG03: 67 BPM
WBC # BLD AUTO: 6.9 K/UL (ref 4.1–11.1)

## 2020-11-04 PROCEDURE — 93005 ELECTROCARDIOGRAM TRACING: CPT

## 2020-11-04 PROCEDURE — 80048 BASIC METABOLIC PNL TOTAL CA: CPT

## 2020-11-04 PROCEDURE — 85025 COMPLETE CBC W/AUTO DIFF WBC: CPT

## 2020-11-04 PROCEDURE — 99284 EMERGENCY DEPT VISIT MOD MDM: CPT

## 2020-11-04 PROCEDURE — 84484 ASSAY OF TROPONIN QUANT: CPT

## 2020-11-04 PROCEDURE — 36415 COLL VENOUS BLD VENIPUNCTURE: CPT

## 2020-11-04 NOTE — ED TRIAGE NOTES
Intermittent palpitations x 1 week, last night had more consistent palpitations. Denies chest pain, has mild left arm pain. Pt started on Eliquis yesterday for hx of afib.

## 2020-11-04 NOTE — DISCHARGE INSTRUCTIONS

## 2020-11-04 NOTE — ED PROVIDER NOTES
The history is provided by the patient. Palpitations    This is a recurrent problem. The current episode started yesterday. The problem has been resolved (intermitten, none currently). The problem occurs daily. On average, each episode lasts 1 hour. The problem is associated with beta blockers (started Eliquis yesterday). Associated symptoms include irregular heartbeat. Pertinent negatives include no diaphoresis, no fever, no chest pain, no abdominal pain, no vomiting, no headaches, no leg pain, no dizziness, no cough, no hemoptysis and no shortness of breath. Associated symptoms comments: L arm pain which patient attributes to doing lawn work two days ago, thinks he strained it. . He has tried nothing for the symptoms. The treatment provided significant relief. His past medical history is significant for atrial fibrillation (just started Eliquis). Past Medical History:   Diagnosis Date    Acoustic neuroma [facility]) 2012    R  Marlon/ns    Colonic polyps 2006    Osorio/gi    Diverticulosis 2006    Encounter for screening colonoscopy 07/11/2018    Dr. Fidelina Grissom - repeat in 3 years    HTN, goal below 150/90     Hypertrophy of prostate without urinary obstruction and other lower urinary tract symptoms (LUTS)     Dorado/uro    IBS (irritable bowel syndrome)     Lumbar stenosis     severe  Van/os    Other and unspecified hyperlipidemia 7/11    Paroxysmal atrial fibrillation (HCC)     ASA for anticoagulation. Beta blocker.     Unstable angina (HCC)     negative stress test 10/2016       Past Surgical History:   Procedure Laterality Date    BIOPSY PROSTATE  2006    HX CHOLECYSTECTOMY  2004    HX HERNIA REPAIR  1996         Family History:   Problem Relation Age of Onset    Diabetes Mother     Hypertension Mother     Cancer Mother         esophageal    Diabetes Father     Hypertension Father     Diabetes Brother     Hypertension Brother     Stroke Maternal Grandfather        Social History Socioeconomic History    Marital status: SINGLE     Spouse name: Not on file    Number of children: Not on file    Years of education: Not on file    Highest education level: Not on file   Occupational History    Occupation:     Social Needs    Financial resource strain: Not on file    Food insecurity     Worry: Not on file     Inability: Not on file    Transportation needs     Medical: Not on file     Non-medical: Not on file   Tobacco Use    Smoking status: Never Smoker    Smokeless tobacco: Never Used   Substance and Sexual Activity    Alcohol use: Yes     Alcohol/week: 0.0 standard drinks     Comment: occasional    Drug use: No    Sexual activity: Not Currently   Lifestyle    Physical activity     Days per week: Not on file     Minutes per session: Not on file    Stress: Not on file   Relationships    Social connections     Talks on phone: Not on file     Gets together: Not on file     Attends Yarsanism service: Not on file     Active member of club or organization: Not on file     Attends meetings of clubs or organizations: Not on file     Relationship status: Not on file    Intimate partner violence     Fear of current or ex partner: Not on file     Emotionally abused: Not on file     Physically abused: Not on file     Forced sexual activity: Not on file   Other Topics Concern     Service Not Asked    Blood Transfusions Not Asked    Caffeine Concern Not Asked    Occupational Exposure Not Asked   Link Mando Hazards Not Asked    Sleep Concern Not Asked    Stress Concern Not Asked    Weight Concern Not Asked    Special Diet Yes     Comment: healthy    Back Care Not Asked    Exercise Yes     Comment: walks daily    Bike Helmet Not Asked    Seat Belt Not Asked    Self-Exams Not Asked   Social History Narrative    Not on file         ALLERGIES: Ciprofloxacin; Codeine; and Hydrocodone    Review of Systems   Constitutional: Negative for diaphoresis and fever. Respiratory: Negative for cough, hemoptysis and shortness of breath. Cardiovascular: Positive for palpitations. Negative for chest pain and leg swelling. Gastrointestinal: Negative for abdominal pain, blood in stool and vomiting. Neurological: Negative for dizziness, syncope and headaches. All other systems reviewed and are negative. Vitals:    11/04/20 1348   BP: (!) 147/88   Pulse: 73   Resp: 16   Temp: 98.6 °F (37 °C)   SpO2: 98%   Weight: 82.9 kg (182 lb 12.2 oz)   Height: 5' 11.5\" (1.816 m)            Physical Exam  Vitals signs and nursing note reviewed. Constitutional:       General: He is not in acute distress. Appearance: He is well-developed. HENT:      Head: Normocephalic and atraumatic. Eyes:      Conjunctiva/sclera: Conjunctivae normal.   Neck:      Musculoskeletal: Normal range of motion and neck supple. Cardiovascular:      Rate and Rhythm: Normal rate and regular rhythm. Comments: Occasional ectopy on monitor. Pulmonary:      Effort: Pulmonary effort is normal. No respiratory distress. Breath sounds: Normal breath sounds. Abdominal:      Palpations: Abdomen is soft. Tenderness: There is no abdominal tenderness. There is no guarding. Musculoskeletal:         General: No tenderness. Right lower leg: No edema. Left lower leg: No edema. Skin:     General: Skin is warm and dry. Neurological:      Mental Status: He is alert and oriented to person, place, and time. Motor: No abnormal muscle tone. MDM       Procedures      EKG interpretation: 13:49  Rhythm: normal sinus rhythm; and regular .  Rate (approx.): 67; Axis: normal; Intervals: normal ; ST/T wave: non-specific changes, no STEMI, no acute changes c/w EKG on 4/17/2018; EKG documented and interpreted by Clarence Vela MD, ED MD.    A/P: This is a 60-year-old male with history of paroxysmal A. fib started on Eliquis yesterday and also on a beta-blocker, who presents with intermittent palpitations for the past 12-14 hours. EKG here shows sinus rhythm, however, on the monitor he has frequent ectopy that appears to be PACs. Possible patient has been having short episodes of paroxysmal A. fib. We will keep him on monitor here while we obtain labs to screen for anemia or electrolyte abnormalities which could be contributing. Anticipate discharge home if labs reassuring and patient remains in sinus rhythm.

## 2020-11-16 ENCOUNTER — TELEPHONE (OUTPATIENT)
Dept: CARDIOLOGY CLINIC | Age: 65
End: 2020-11-16

## 2020-11-16 NOTE — TELEPHONE ENCOUNTER
Returned call to patient. Two patient indentifiers verified. Pt stated that he has had an increase in episodes. Pt stated that his heart feels like it pounding. Pt stated his heart rate has been between 45-80 per his apple watch. He stated the episodes can last 20-30 min up to a couple hours. Pt went to ER for an episode. Pt is scheduled for an appointment next week but wants to know if he needs to do anything now or be seen sooner.

## 2020-11-16 NOTE — TELEPHONE ENCOUNTER
Patient is requesting to speak with Sol Melendez. Patient did not wish to give any further details just that he wished to speak with you. Please advise.      Phone: [phone]

## 2020-11-16 NOTE — TELEPHONE ENCOUNTER
Returned call to patient. Two patient indentifiers verified. Pt was informed of message. Pt appointment moved up to tomorrow. Pt denies having any symptoms.

## 2020-11-16 NOTE — TELEPHONE ENCOUNTER
MD Afshan Serrnao, BENI    Caller: Unspecified (Today,  8:07 AM)               If his heart rate is controlled, no changes. If he is getting lightheaded/dizzy or symptoms when his heart rate is low, would cut toprol in half. Can move follow up appt to tomorrow if he would like.  darius

## 2020-11-17 ENCOUNTER — OFFICE VISIT (OUTPATIENT)
Dept: CARDIOLOGY CLINIC | Age: 65
End: 2020-11-17
Payer: MEDICARE

## 2020-11-17 VITALS
HEIGHT: 72 IN | SYSTOLIC BLOOD PRESSURE: 138 MMHG | DIASTOLIC BLOOD PRESSURE: 78 MMHG | OXYGEN SATURATION: 98 % | BODY MASS INDEX: 24.65 KG/M2 | WEIGHT: 182 LBS | HEART RATE: 71 BPM

## 2020-11-17 DIAGNOSIS — I10 BENIGN ESSENTIAL HTN: ICD-10-CM

## 2020-11-17 DIAGNOSIS — Z79.01 CHRONIC ANTICOAGULATION: ICD-10-CM

## 2020-11-17 DIAGNOSIS — E78.2 MIXED HYPERLIPIDEMIA: ICD-10-CM

## 2020-11-17 DIAGNOSIS — I48.0 PAROXYSMAL A-FIB (HCC): ICD-10-CM

## 2020-11-17 DIAGNOSIS — I20.0 UNSTABLE ANGINA (HCC): Primary | ICD-10-CM

## 2020-11-17 PROCEDURE — 99214 OFFICE O/P EST MOD 30 MIN: CPT | Performed by: INTERNAL MEDICINE

## 2020-11-17 RX ORDER — CLOBETASOL PROPIONATE 0.5 MG/G
1 OINTMENT TOPICAL AS NEEDED
COMMUNITY
Start: 2020-11-06

## 2020-11-17 NOTE — PATIENT INSTRUCTIONS
You will be scheduled for a Stress Echo after your appointment today. Please wear comfortable clothing (shorts or pants with a shirt or blouse) and walking/athletic shoes.  
 
Do not eat or drink anything, except water, for at least 2 hours prior to your test. 
 
Do not take your scheduled medications prior to your test. 
 
Hold Toprol the day of the test

## 2020-11-17 NOTE — PROGRESS NOTES
[facility]: [facility] of [facility] in [place]  [phone]    History of Present Illness:  Mr. Mery Luu is a 71 yo M with hypertension, borderline hyperlipidemia seen in past for an abnormal EKG with Q waves in V1 and V2 with question of old anteroseptal infarct. Echo 2015 was normal with no infarct. He is here now due to palpitations. They have been occurring over the past week or two where he will feel his heart go irregular for 20-30 minutes at a time, can happen when he is at rest, sometimes more so after dinner. This did start a few days before starting Eliquis. So far, he has not had any bleeding issues on Eliquis. He did go to the emergency room a few days ago on 11/04/2020 due to these symptoms and workup there was overall unrevealing. Separately, he has been having some jaw, as well as back pain that he is concerned might be heart related. This can last for several minutes at a time, often times can happen with rest or exertion. He is compensated on exam with clear lungs and no lower extremity edema. His blood pressure here is 138/78 with heart rate of 71. Soc hx. No tobacco.  Fam hx. No early CAD. Assessment and Plan:    1. Unstable angina. Neck and jaw pain, possible anginal equivalent with multiple risk factors; will proceed with a treadmill stress echocardiogram for further evaluation. 2. Paroxysmal atrial fibrillation. He is stable on beta blocker for rate control, but he is having a lot of breakthrough palpitations. When he is in atrial fibrillation, his heart rate is controlled between 60 and 80 bpm per his watch, but he does find the symptoms very bothersome. We talked to him about the options going forward with regard to medical therapy versus ablation and will at least set him up with an appointment to discuss that option with Dr. Bart Forde. 3. Chronic anticoagulation. No bleeding issues on Eliquis. 4. Mixed hyperlipidemia. Working on lifestyle changes. 5. Essential hypertension.   Blood pressure is controlled. He  has a past medical history of Acoustic neuroma ([facility] [address] (2012), Colonic polyps (2006), Diverticulosis (2006), Encounter for screening colonoscopy (07/11/2018), HTN, goal below 150/90, Hypertrophy of prostate without urinary obstruction and other lower urinary tract symptoms (LUTS), IBS (irritable bowel syndrome), Lumbar stenosis, Other and unspecified hyperlipidemia (7/11), Paroxysmal atrial fibrillation ([facility] [address] and Unstable angina ([facility] [address] All other systems negative except as above. PE  Vitals:    11/17/20 1317   BP: 138/78   Pulse: 71   SpO2: 98%   Weight: 182 lb (82.6 kg)   Height: 5' 11.5\" (1.816 m)    Body mass index is 25.03 kg/m².    General appearance - alert, well appearing, and in no distress  Mental status - affect appropriate to mood  Eyes - sclera anicteric, moist mucous membranes  Neck - supple, no significant adenopathy, no JVD  Chest - clear to auscultation, no wheezes, rales or rhonchi  Heart - normal rate, regular rhythm, normal S1, S2, no murmurs, rubs, clicks or gallops  Abdomen - soft, nontender, nondistended, no masses or organomegaly  Neurological - no focal deficit  Extremities - peripheral pulses normal, no pedal edema      Recent Labs:  Lab Results   Component Value Date/Time    Cholesterol, total 146 09/16/2020 07:54 AM    HDL Cholesterol 54 09/16/2020 07:54 AM    LDL, calculated 60 09/21/2019 09:15 AM    LDL Chol Calc (NIH) 78 09/16/2020 07:54 AM    Triglyceride 71 09/16/2020 07:54 AM    CHOL/HDL Ratio 3.6 03/29/2010 10:01 AM     Lab Results   Component Value Date/Time    Creatinine (POC) 0.9 03/24/2012 09:20 AM    Creatinine 0.90 11/04/2020 01:57 PM     Lab Results   Component Value Date/Time    BUN 17 11/04/2020 01:57 PM     Lab Results   Component Value Date/Time    Potassium 4.1 11/04/2020 01:57 PM     Lab Results   Component Value Date/Time    Hemoglobin A1c 5.6 09/16/2020 07:54 AM     Lab Results   Component Value Date/Time    HGB 16.5 11/04/2020 01:57 PM     Lab Results   Component Value Date/Time    PLATELET 050 68/22/9055 01:57 PM       Reviewed:  Past Medical History:   Diagnosis Date    Acoustic neuroma ([address] 2012    R  Marlon/ns    Colonic polyps 2006    Osorio/gi    Diverticulosis 2006    Encounter for screening colonoscopy 07/11/2018    Dr. Cherise Banerjee - repeat in 3 years    HTN, goal below 150/90     Hypertrophy of prostate without urinary obstruction and other lower urinary tract symptoms (LUTS)     Dorado/uro    IBS (irritable bowel syndrome)     Lumbar stenosis     severe  Van/os    Other and unspecified hyperlipidemia 7/11    Paroxysmal atrial fibrillation (HCC)     ASA for anticoagulation. Beta blocker.  Unstable angina (HCC)     negative stress test 10/2016     Social History     Tobacco Use   Smoking Status Never Smoker   Smokeless Tobacco Never Used     Social History     Substance and Sexual Activity   Alcohol Use Yes    Alcohol/week: 0.0 standard drinks    Comment: occasional     Allergies   Allergen Reactions    Ciprofloxacin Myalgia    Codeine Other (comments)     Since allergy to hydrocodone shouldn't take codeine    Hydrocodone Shortness of Breath       Current Outpatient Medications   Medication Sig    clobetasoL (TEMOVATE) 0.05 % ointment Apply 1 Bottle to affected area as needed.  apixaban (ELIQUIS) 5 mg tablet Take 1 Tab by mouth two (2) times a day.  atorvastatin (LIPITOR) 10 mg tablet TAKE 1 TABLET DAILY    ALPRAZolam (XANAX) 0.5 mg tablet Take 1 tab by mouth once before flight for anxiety    metoprolol succinate (TOPROL-XL) 25 mg XL tablet TAKE 1 TABLET DAILY    olmesartan (BENICAR) 40 mg tablet TAKE 1 TABLET DAILY    dutaseride (AVODART) 0.5 mg capsule Take 0.5 mg by mouth daily. No current facility-administered medications for this visit.         Joann Calderón MD  [facility] [place]  [address] [facility] [address]  [address]

## 2020-12-01 RX ORDER — METOPROLOL SUCCINATE 25 MG/1
TABLET, EXTENDED RELEASE ORAL
Qty: 90 TAB | Refills: 3 | Status: SHIPPED | OUTPATIENT
Start: 2020-12-01 | End: 2021-02-02 | Stop reason: SDUPTHER

## 2020-12-01 NOTE — TELEPHONE ENCOUNTER
Requested Prescriptions     Signed Prescriptions Disp Refills    metoprolol succinate (TOPROL-XL) 25 mg XL tablet 90 Tab 3     Sig: TAKE 1 TABLET DAILY     Authorizing Provider: Olivia Garcia     Ordering User: Ginna Frey       Last office visit 11/17/2020    Per Dr. Jared Parra   Date Time Provider Harini Radha   12/8/2020 10:00 AM LORI SANTIAGO BS AMB   12/29/2020  3:40 PM MD TRINH Mercedes BS AMB   5/12/2021  9:00 AM MD TRINH Galicia BS AMB

## 2020-12-08 ENCOUNTER — ANCILLARY PROCEDURE (OUTPATIENT)
Dept: CARDIOLOGY CLINIC | Age: 65
End: 2020-12-08
Payer: COMMERCIAL

## 2020-12-08 VITALS
WEIGHT: 182 LBS | DIASTOLIC BLOOD PRESSURE: 80 MMHG | HEIGHT: 71 IN | SYSTOLIC BLOOD PRESSURE: 146 MMHG | BODY MASS INDEX: 25.48 KG/M2

## 2020-12-08 DIAGNOSIS — I20.0 UNSTABLE ANGINA (HCC): ICD-10-CM

## 2020-12-08 DIAGNOSIS — I10 BENIGN ESSENTIAL HTN: ICD-10-CM

## 2020-12-08 DIAGNOSIS — Z79.01 CHRONIC ANTICOAGULATION: ICD-10-CM

## 2020-12-08 DIAGNOSIS — E78.2 MIXED HYPERLIPIDEMIA: ICD-10-CM

## 2020-12-08 DIAGNOSIS — I48.0 PAROXYSMAL A-FIB (HCC): ICD-10-CM

## 2020-12-08 PROCEDURE — 93351 STRESS TTE COMPLETE: CPT | Performed by: INTERNAL MEDICINE

## 2020-12-16 LAB
STRESS ANGINA INDEX: 0
STRESS BASELINE DIAS BP: 80 MMHG
STRESS BASELINE HR: 74 BPM
STRESS BASELINE SYS BP: 146 MMHG
STRESS EXERCISE DUR MIN: NORMAL MIN:SEC
STRESS O2 SAT PEAK: 98 %
STRESS O2 SAT REST: 98 %
STRESS PEAK DIAS BP: 70 MMHG
STRESS PEAK SYS BP: 164 MMHG
STRESS PERCENT HR ACHIEVED: 91 %
STRESS POST PEAK HR: 141 BPM
STRESS RATE PRESSURE PRODUCT: NORMAL BPM*MMHG
STRESS SR DUKE TREADMILL SCORE: 7
STRESS ST DEPRESSION: 0 MM
STRESS ST ELEVATION: 0 MM
STRESS TARGET HR: 155 BPM

## 2021-01-08 RX ORDER — ATORVASTATIN CALCIUM 10 MG/1
TABLET, FILM COATED ORAL
Qty: 90 TAB | Refills: 3 | Status: SHIPPED | OUTPATIENT
Start: 2021-01-08

## 2021-02-02 RX ORDER — OLMESARTAN MEDOXOMIL 40 MG/1
40 TABLET ORAL DAILY
Qty: 90 TAB | Refills: 3 | Status: SHIPPED | OUTPATIENT
Start: 2021-02-02 | End: 2022-01-06

## 2021-02-02 RX ORDER — METOPROLOL SUCCINATE 25 MG/1
25 TABLET, EXTENDED RELEASE ORAL DAILY
Qty: 90 TAB | Refills: 3 | Status: SHIPPED | OUTPATIENT
Start: 2021-02-02 | End: 2022-01-06

## 2021-02-02 NOTE — TELEPHONE ENCOUNTER
Requested Prescriptions     Signed Prescriptions Disp Refills    metoprolol succinate (TOPROL-XL) 25 mg XL tablet 90 Tab 3     Sig: Take 1 Tab by mouth daily. Authorizing Provider: Mitchell Lucero     Ordering User: Teddy Clause    apixaban (ELIQUIS) 5 mg tablet 180 Tab 3     Sig: Take 1 Tab by mouth two (2) times a day. Authorizing Provider: Mitchell Lucero     Ordering User: Teddy Clause    olmesartan (BENICAR) 40 mg tablet 90 Tab 3     Sig: Take 1 Tab by mouth daily.      Authorizing Provider: Mitchell Lucero     Ordering User: Teddy Clause       Last office visit 11/20/20    Per Dr. Yves Devine   Date Time Provider [facility]   3/9/2021  9:00 AM MD TRINH Estrella BS AMB   5/12/2021  9:00 AM MD TRINH Fuentes BS AMB

## 2021-03-09 ENCOUNTER — OFFICE VISIT (OUTPATIENT)
Dept: CARDIOLOGY CLINIC | Age: 66
End: 2021-03-09
Payer: MEDICARE

## 2021-03-09 VITALS
HEIGHT: 71 IN | SYSTOLIC BLOOD PRESSURE: 130 MMHG | HEART RATE: 80 BPM | WEIGHT: 184 LBS | BODY MASS INDEX: 25.76 KG/M2 | DIASTOLIC BLOOD PRESSURE: 84 MMHG | RESPIRATION RATE: 16 BRPM

## 2021-03-09 DIAGNOSIS — I48.0 PAROXYSMAL A-FIB (HCC): Primary | ICD-10-CM

## 2021-03-09 DIAGNOSIS — I10 BENIGN ESSENTIAL HTN: ICD-10-CM

## 2021-03-09 DIAGNOSIS — Z79.01 CHRONIC ANTICOAGULATION: ICD-10-CM

## 2021-03-09 PROCEDURE — 1101F PT FALLS ASSESS-DOCD LE1/YR: CPT | Performed by: INTERNAL MEDICINE

## 2021-03-09 PROCEDURE — G0463 HOSPITAL OUTPT CLINIC VISIT: HCPCS | Performed by: INTERNAL MEDICINE

## 2021-03-09 PROCEDURE — G8536 NO DOC ELDER MAL SCRN: HCPCS | Performed by: INTERNAL MEDICINE

## 2021-03-09 PROCEDURE — G8754 DIAS BP LESS 90: HCPCS | Performed by: INTERNAL MEDICINE

## 2021-03-09 PROCEDURE — 99204 OFFICE O/P NEW MOD 45 MIN: CPT | Performed by: INTERNAL MEDICINE

## 2021-03-09 PROCEDURE — G8419 CALC BMI OUT NRM PARAM NOF/U: HCPCS | Performed by: INTERNAL MEDICINE

## 2021-03-09 PROCEDURE — 3017F COLORECTAL CA SCREEN DOC REV: CPT | Performed by: INTERNAL MEDICINE

## 2021-03-09 PROCEDURE — G8432 DEP SCR NOT DOC, RNG: HCPCS | Performed by: INTERNAL MEDICINE

## 2021-03-09 PROCEDURE — G8427 DOCREV CUR MEDS BY ELIG CLIN: HCPCS | Performed by: INTERNAL MEDICINE

## 2021-03-09 PROCEDURE — G8752 SYS BP LESS 140: HCPCS | Performed by: INTERNAL MEDICINE

## 2021-03-09 NOTE — PROGRESS NOTES
Cardiac Electrophysiology OFFICE Consultation Note     Subjective:      Cheryll Osler. is a 72 y.o. patient who is seen for evaluation of PAF and palpitation   He has no chest pain or stroke  He had stresses going into detention, election and felt more palpitation in NOv or Dec of 2020 but last 3 months has been asymptomatic  He bought an iwatch and he has no symptoms     Stress echo was negative for ischemia  He is referred by Dr Marcie Askew    Patient Active Problem List   Diagnosis Code    Lumbar stenosis M48.061    History of BPH Z87.438    IBS (irritable bowel syndrome) K58.9    Diverticulosis K57.90    Colon polyp K63.5    History of acoustic neuroma Z86.018    Paroxysmal A-fib (HCC) I48.0    Benign essential HTN I10    Mixed hyperlipidemia E78.2     Current Outpatient Medications   Medication Sig Dispense Refill    metoprolol succinate (TOPROL-XL) 25 mg XL tablet Take 1 Tab by mouth daily. 90 Tab 3    apixaban (ELIQUIS) 5 mg tablet Take 1 Tab by mouth two (2) times a day. 180 Tab 3    olmesartan (BENICAR) 40 mg tablet Take 1 Tab by mouth daily. 90 Tab 3    atorvastatin (LIPITOR) 10 mg tablet TAKE 1 TABLET DAILY 90 Tab 3    clobetasoL (TEMOVATE) 0.05 % ointment Apply 1 Bottle to affected area as needed.  ALPRAZolam (XANAX) 0.5 mg tablet Take 1 tab by mouth once before flight for anxiety 10 Tab 0    dutaseride (AVODART) 0.5 mg capsule Take 0.5 mg by mouth daily.        Allergies   Allergen Reactions    Ciprofloxacin Myalgia    Codeine Other (comments)     Since allergy to hydrocodone shouldn't take codeine    Hydrocodone Shortness of Breath     Past Medical History:   Diagnosis Date    Acoustic neuroma ([facility] [address] 2012    R  Marlon/ns    Colonic polyps 2006    Osorio/gi    Diverticulosis 2006    Encounter for screening colonoscopy 07/11/2018    Dr. Radha Ram - repeat in 3 years    HTN, goal below 150/90     Hypertrophy of prostate without urinary obstruction and other lower urinary tract symptoms (LUTS)     Dorado/uro    IBS (irritable bowel syndrome)     Lumbar stenosis     severe  Van/os    Other and unspecified hyperlipidemia 7/11    Paroxysmal atrial fibrillation (HCC)     ASA for anticoagulation. Beta blocker.  Unstable angina ([address]     negative stress test 10/2016     Past Surgical History:   Procedure Laterality Date    BIOPSY PROSTATE  2006    HX CHOLECYSTECTOMY  2004    HX HERNIA REPAIR  1996     Family History   Problem Relation Age of Onset    Diabetes Mother     Hypertension Mother     Cancer Mother         esophageal    Diabetes Father     Hypertension Father     Diabetes Brother     Hypertension Brother     Stroke Maternal Grandfather      Social History     Tobacco Use    Smoking status: Never Smoker    Smokeless tobacco: Never Used   Substance Use Topics    Alcohol use: Yes     Alcohol/week: 0.0 standard drinks     Comment: occasional        Review of Systems:   Constitutional: Negative for fever, chills, weight loss, malaise/fatigue. HEENT: Negative for nosebleeds, vision changes. Respiratory: Negative for cough, hemoptysis  Cardiovascular: Negative for chest pain, palpitations, orthopnea, claudication, leg swelling, syncope, and PND. Gastrointestinal: Negative for nausea, vomiting, diarrhea, blood in stool and melena. Genitourinary: Negative for dysuria, and hematuria. Musculoskeletal: Negative for myalgias, arthralgia. Skin: Negative for rash. Heme: Does not bleed or bruise easily. Neurological: Negative for speech change and focal weakness     Objective:     Visit Vitals  /84 (BP 1 Location: Left upper arm, BP Patient Position: Sitting)   Pulse 80   Resp 16   Ht 5' 11\" (1.803 m)   Wt 184 lb (83.5 kg)   BMI 25.66 kg/m²      Physical Exam:   Constitutional: well-developed and well-nourished. No respiratory distress. Head: Normocephalic and atraumatic. Eyes: Pupils are equal, round  ENT: hearing normal  Neck: supple. No JVD present. Cardiovascular: Normal rate, regular rhythm. Exam reveals no gallop and no friction rub. No murmur heard. Pulmonary/Chest: Effort normal and breath sounds normal. No wheezes. Abdominal: Soft, no tenderness. Musculoskeletal: no edema. Neurological: alert,oriented. Skin: Skin is warm and dry  Psychiatric: normal mood and affect. Behavior is normal. Judgment and thought content normal.         Assessment/Plan:       ICD-10-CM ICD-9-CM    1. Paroxysmal A-fib (HCC)  I48.0 427.31    2. Benign essential HTN  I10 401.1    3. Chronic anticoagulation  Z79.01 V58.61       He is mainly asymptomatic now so I do not recommend ablation of PAF and he agrees  He however talked to me about risks and benefits and procedure at this time  He is aware of the procedure nature and will call back to plan for it if he has PAF and symptoms again  bp is controlled  No bleeding with DOAC and he will continue    Future Appointments   Date Time Provider Harini Garcia   5/12/2021  9:00 AM MD TRINH Arvizu AMB       Thank you for involving me in this patient's care and please call with further concerns or questions. Yonny Harper M.D.   Electrophysiology/Cardiology  [facility] [place]  [address] [place] [address]                             [phone]

## 2021-05-12 ENCOUNTER — OFFICE VISIT (OUTPATIENT)
Dept: CARDIOLOGY CLINIC | Age: 66
End: 2021-05-12
Payer: MEDICARE

## 2021-05-12 VITALS
RESPIRATION RATE: 16 BRPM | WEIGHT: 183 LBS | BODY MASS INDEX: 25.62 KG/M2 | DIASTOLIC BLOOD PRESSURE: 70 MMHG | HEART RATE: 67 BPM | OXYGEN SATURATION: 97 % | HEIGHT: 71 IN | SYSTOLIC BLOOD PRESSURE: 120 MMHG

## 2021-05-12 DIAGNOSIS — I48.0 PAROXYSMAL A-FIB (HCC): Primary | ICD-10-CM

## 2021-05-12 DIAGNOSIS — Z79.01 CHRONIC ANTICOAGULATION: ICD-10-CM

## 2021-05-12 DIAGNOSIS — E78.2 MIXED HYPERLIPIDEMIA: ICD-10-CM

## 2021-05-12 DIAGNOSIS — I10 BENIGN ESSENTIAL HTN: ICD-10-CM

## 2021-05-12 PROCEDURE — G0463 HOSPITAL OUTPT CLINIC VISIT: HCPCS | Performed by: INTERNAL MEDICINE

## 2021-05-12 PROCEDURE — G8536 NO DOC ELDER MAL SCRN: HCPCS | Performed by: INTERNAL MEDICINE

## 2021-05-12 PROCEDURE — 99214 OFFICE O/P EST MOD 30 MIN: CPT | Performed by: INTERNAL MEDICINE

## 2021-05-12 PROCEDURE — G8419 CALC BMI OUT NRM PARAM NOF/U: HCPCS | Performed by: INTERNAL MEDICINE

## 2021-05-12 PROCEDURE — G8752 SYS BP LESS 140: HCPCS | Performed by: INTERNAL MEDICINE

## 2021-05-12 PROCEDURE — 1101F PT FALLS ASSESS-DOCD LE1/YR: CPT | Performed by: INTERNAL MEDICINE

## 2021-05-12 PROCEDURE — 3017F COLORECTAL CA SCREEN DOC REV: CPT | Performed by: INTERNAL MEDICINE

## 2021-05-12 PROCEDURE — G8432 DEP SCR NOT DOC, RNG: HCPCS | Performed by: INTERNAL MEDICINE

## 2021-05-12 PROCEDURE — G8754 DIAS BP LESS 90: HCPCS | Performed by: INTERNAL MEDICINE

## 2021-05-12 PROCEDURE — G8427 DOCREV CUR MEDS BY ELIG CLIN: HCPCS | Performed by: INTERNAL MEDICINE

## 2021-05-12 NOTE — LETTER
Patient:  Yvette Hess. YOB: 1955 Date of Visit: 5/12/2021 Dear Meliton Kent MD 
[place] [address] [address] [facility] [address] [address] Fax: [phone]: 
 
  Mr. Maki Mcqueen is a 73 yo M with hypertension, borderline hyperlipidemia seen in past for an abnormal EKG with Q waves in V1 and V2 with question of old anteroseptal infarct. Echo 2015 was normal with no infarct. On his last visit due to unstable angina, some neck and jaw pain had him get a stress test that was negative for ischemia. For his atrial fibrillation and breakthrough palpitations, had him see our EP Dr. Suzie Reyes to talk about options going forward. His symptoms have gotten better so he did not recommend ablation. Overall he had been doing good until two to three weeks ago he had a few days where he was getting PVCs. This was at the time of his annual physical with his primary care. He said he had one cup of coffee, otherwise he has been avoiding caffeine. No change in activity or diet. No obvious triggers. He denies any bleeding issues on blood thinner. No chest pain or shortness of breath. He usually will travel to [facility], but with [facility] he is going to defer this year. He is compensated on exam with clear lungs and no lower extremity edema. Blood pressure is 120/70 with a heart rate of 67. Soc hx. No tobacco. 
Fam hx. No early CAD. Assessment and Plan: 1. Paroxysmal atrial fibrillation, PVCs. Overall his atrial fibrillation has been well controlled on beta blocker. He was having some benign PVCs and reassured him. He can take an extra of Toprol if he has a flare. He did see Dr. Suzie Reyes for consideration of ablation if his atrial fibrillation becomes hard to control despite medical therapy. Will have him follow back in six months. 2. Chronic anticoagulation. He is on Eliquis. 3. Mixed hyperlipidemia. Working on lifestyle changes. 4. Essential hypertension. Blood pressure is controlled.    
5. Colonoscopy. He had this in July and talked about being able to hold his blood thinner 48 hours prior. If you have questions, please do not hesitate to call me. Sincerely, Tiffanie Aj MD

## 2021-05-12 NOTE — PROGRESS NOTES
[facility]: Jacobo Sun  [phone]    History of Present Illness:  Mr. Kari Riley is a 73 yo M with hypertension, borderline hyperlipidemia seen in past for an abnormal EKG with Q waves in V1 and V2 with question of old anteroseptal infarct. Echo 2015 was normal with no infarct. On his last visit due to unstable angina, some neck and jaw pain had him get a stress test that was negative for ischemia. For his atrial fibrillation and breakthrough palpitations, had him see our EP Dr. Randee Wilcox to talk about options going forward. His symptoms have gotten better so he did not recommend ablation. Overall he had been doing good until two to three weeks ago he had a few days where he was getting PVCs. This was at the time of his annual physical with his primary care. He said he had one cup of coffee, otherwise he has been avoiding caffeine. No change in activity or diet. No obvious triggers. He denies any bleeding issues on blood thinner. No chest pain or shortness of breath. He usually will travel to [facility], but with [facility] he is going to defer this year. He is compensated on exam with clear lungs and no lower extremity edema. Blood pressure is 120/70 with a heart rate of 67. Soc hx. No tobacco.  Fam hx. No early CAD. Assessment and Plan:   1. Paroxysmal atrial fibrillation, PVCs. Overall his atrial fibrillation has been well controlled on beta blocker. He was having some benign PVCs and reassured him. He can take an extra of Toprol if he has a flare. He did see Dr. Randee Wilcox for consideration of ablation if his atrial fibrillation becomes hard to control despite medical therapy. Will have him follow back in six months. 2. Chronic anticoagulation. He is on Eliquis. 3. Mixed hyperlipidemia. Working on lifestyle changes. 4. Essential hypertension. Blood pressure is controlled. 5. Colonoscopy. He had this in July and talked about being able to hold his blood thinner 48 hours prior.         He has a past medical history of Acoustic neuroma ([facility] (2012), Colonic polyps (2006), Diverticulosis (2006), Encounter for screening colonoscopy (07/11/2018), HTN, goal below 150/90, Hypertrophy of prostate without urinary obstruction and other lower urinary tract symptoms (LUTS), IBS (irritable bowel syndrome), Lumbar stenosis, Other and unspecified hyperlipidemia (7/11), [facility]ox[facility]mal [facility] and Unstable angina ([facility] All other systems negative except as above. PE  Vitals:    05/12/21 0859   BP: 120/70   Pulse: 67   Resp: 16   SpO2: 97%   Weight: 183 lb (83 kg)   Height: 5' 11\" (1.803 m)    Body mass index is 25.52 kg/m².    General appearance - alert, well appearing, and in no distress  Mental status - affect appropriate to mood  Eyes - sclera anicteric, moist mucous membranes  Neck - supple, no significant adenopathy, no JVD  Chest - clear to auscultation, no wheezes, rales or rhonchi  Heart - normal rate, regular rhythm, normal S1, S2, no murmurs, rubs, clicks or gallops  Abdomen - soft, nontender, nondistended, no masses or organomegaly  Neurological - no focal deficit  Extremities - peripheral pulses normal, no pedal edema      Recent Labs:  Lab Results   Component Value Date/Time    Cholesterol, total 146 09/16/2020 07:54 AM    HDL Cholesterol 54 09/16/2020 07:54 AM    LDL, calculated 78 09/16/2020 07:54 AM    LDL, calculated 60 09/21/2019 09:15 AM    Triglyceride 71 09/16/2020 07:54 AM    CHOL/HDL Ratio 3.6 03/29/2010 10:01 AM     Lab Results   Component Value Date/Time    Creatinine (POC) 0.9 03/24/2012 09:20 AM    Creatinine 0.90 11/04/2020 01:57 PM     Lab Results   Component Value Date/Time    BUN 17 11/04/2020 01:57 PM     Lab Results   Component Value Date/Time    Potassium 4.1 11/04/2020 01:57 PM     Lab Results   Component Value Date/Time    Hemoglobin A1c 5.6 09/16/2020 07:54 AM     Lab Results   Component Value Date/Time    HGB 16.5 11/04/2020 01:57 PM     Lab Results Component Value Date/Time    PLATELET 045 29/52/6038 01:57 PM       Reviewed:  Past Medical History:   Diagnosis Date    Acoustic neuroma ([facility] [facility] 2012    R  Marlon/ns    Colonic polyps 2006    Osorio/gi    Diverticulosis 2006    Encounter for screening colonoscopy 07/11/2018    Dr. Jean-Paul Aleman - repeat in 3 years    HTN, goal below 150/90     Hypertrophy of prostate without urinary obstruction and other lower urinary tract symptoms (LUTS)     Dorado/uro    IBS (irritable bowel syndrome)     Lumbar stenosis     severe  Van/os    Other and unspecified hyperlipidemia 7/11    Paroxysmal atrial fibrillation (HCC)     ASA for anticoagulation. Beta blocker.  Unstable angina (HCC)     negative stress test 10/2016     Social History     Tobacco Use   Smoking Status Never Smoker   Smokeless Tobacco Never Used     Social History     Substance and Sexual Activity   Alcohol Use Yes    Alcohol/week: 0.0 standard drinks    Comment: occasional     Allergies   Allergen Reactions    Ciprofloxacin Myalgia    Codeine Other (comments)     Since allergy to hydrocodone shouldn't take codeine    Hydrocodone Shortness of Breath       Current Outpatient Medications   Medication Sig    metoprolol succinate (TOPROL-XL) 25 mg XL tablet Take 1 Tab by mouth daily.  apixaban (ELIQUIS) 5 mg tablet Take 1 Tab by mouth two (2) times a day.  olmesartan (BENICAR) 40 mg tablet Take 1 Tab by mouth daily.  atorvastatin (LIPITOR) 10 mg tablet TAKE 1 TABLET DAILY    clobetasoL (TEMOVATE) 0.05 % ointment Apply 1 Bottle to affected area as needed.  ALPRAZolam (XANAX) 0.5 mg tablet Take 1 tab by mouth once before flight for anxiety    dutaseride (AVODART) 0.5 mg capsule Take 0.5 mg by mouth daily. No current facility-administered medications for this visit.         Delphine Castro MD  [address] [facility] [place]  [address], [address]  [address]

## 2021-07-15 ENCOUNTER — ANESTHESIA EVENT (OUTPATIENT)
Dept: ENDOSCOPY | Age: 66
End: 2021-07-15
Payer: MEDICARE

## 2021-07-15 ENCOUNTER — ANESTHESIA (OUTPATIENT)
Dept: ENDOSCOPY | Age: 66
End: 2021-07-15
Payer: MEDICARE

## 2021-07-15 ENCOUNTER — HOSPITAL ENCOUNTER (OUTPATIENT)
Age: 66
Setting detail: OUTPATIENT SURGERY
Discharge: HOME | End: 2021-07-15
Attending: INTERNAL MEDICINE | Admitting: INTERNAL MEDICINE
Payer: MEDICARE

## 2021-07-15 VITALS
HEART RATE: 76 BPM | TEMPERATURE: 97.1 F | DIASTOLIC BLOOD PRESSURE: 70 MMHG | WEIGHT: 175 LBS | BODY MASS INDEX: 24.5 KG/M2 | HEIGHT: 71 IN | RESPIRATION RATE: 14 BRPM | SYSTOLIC BLOOD PRESSURE: 108 MMHG | OXYGEN SATURATION: 97 %

## 2021-07-15 PROCEDURE — 2709999900 HC NON-CHARGEABLE SUPPLY: Performed by: INTERNAL MEDICINE

## 2021-07-15 PROCEDURE — 77030010936 HC CLP LIG BSC -C: Performed by: INTERNAL MEDICINE

## 2021-07-15 PROCEDURE — 77030013992 HC SNR POLYP ENDOSC BSC -B: Performed by: INTERNAL MEDICINE

## 2021-07-15 PROCEDURE — 77030003657 HC NDL SCLER BSC -B: Performed by: INTERNAL MEDICINE

## 2021-07-15 PROCEDURE — 77030021593 HC FCPS BIOP ENDOSC BSC -A: Performed by: INTERNAL MEDICINE

## 2021-07-15 PROCEDURE — 74011000250 HC RX REV CODE- 250: Performed by: NURSE ANESTHETIST, CERTIFIED REGISTERED

## 2021-07-15 PROCEDURE — 74011250636 HC RX REV CODE- 250/636: Performed by: NURSE ANESTHETIST, CERTIFIED REGISTERED

## 2021-07-15 PROCEDURE — 76060000032 HC ANESTHESIA 0.5 TO 1 HR: Performed by: INTERNAL MEDICINE

## 2021-07-15 PROCEDURE — C1713 ANCHOR/SCREW BN/BN,TIS/BN: HCPCS | Performed by: INTERNAL MEDICINE

## 2021-07-15 PROCEDURE — 74011250636 HC RX REV CODE- 250/636: Performed by: INTERNAL MEDICINE

## 2021-07-15 PROCEDURE — 88305 TISSUE EXAM BY PATHOLOGIST: CPT

## 2021-07-15 PROCEDURE — 76040000007: Performed by: INTERNAL MEDICINE

## 2021-07-15 RX ORDER — MIDAZOLAM HYDROCHLORIDE 1 MG/ML
.25-5 INJECTION, SOLUTION INTRAMUSCULAR; INTRAVENOUS
Status: DISCONTINUED | OUTPATIENT
Start: 2021-07-15 | End: 2021-07-15 | Stop reason: HOSPADM

## 2021-07-15 RX ORDER — SODIUM CHLORIDE 9 MG/ML
50 INJECTION, SOLUTION INTRAVENOUS CONTINUOUS
Status: DISCONTINUED | OUTPATIENT
Start: 2021-07-15 | End: 2021-07-15 | Stop reason: HOSPADM

## 2021-07-15 RX ORDER — FLUMAZENIL 0.1 MG/ML
0.2 INJECTION INTRAVENOUS
Status: DISCONTINUED | OUTPATIENT
Start: 2021-07-15 | End: 2021-07-15 | Stop reason: HOSPADM

## 2021-07-15 RX ORDER — EPHEDRINE SULFATE/0.9% NACL/PF 50 MG/5 ML
SYRINGE (ML) INTRAVENOUS AS NEEDED
Status: DISCONTINUED | OUTPATIENT
Start: 2021-07-15 | End: 2021-07-15 | Stop reason: HOSPADM

## 2021-07-15 RX ORDER — SODIUM CHLORIDE 0.9 % (FLUSH) 0.9 %
5-40 SYRINGE (ML) INJECTION AS NEEDED
Status: DISCONTINUED | OUTPATIENT
Start: 2021-07-15 | End: 2021-07-15 | Stop reason: HOSPADM

## 2021-07-15 RX ORDER — SODIUM CHLORIDE 0.9 % (FLUSH) 0.9 %
5-40 SYRINGE (ML) INJECTION EVERY 8 HOURS
Status: DISCONTINUED | OUTPATIENT
Start: 2021-07-15 | End: 2021-07-15 | Stop reason: HOSPADM

## 2021-07-15 RX ORDER — PROPOFOL 10 MG/ML
INJECTION, EMULSION INTRAVENOUS AS NEEDED
Status: DISCONTINUED | OUTPATIENT
Start: 2021-07-15 | End: 2021-07-15 | Stop reason: HOSPADM

## 2021-07-15 RX ORDER — SODIUM CHLORIDE 9 MG/ML
INJECTION, SOLUTION INTRAVENOUS
Status: DISCONTINUED | OUTPATIENT
Start: 2021-07-15 | End: 2021-07-15 | Stop reason: HOSPADM

## 2021-07-15 RX ORDER — EPINEPHRINE 0.1 MG/ML
1 INJECTION INTRACARDIAC; INTRAVENOUS
Status: DISCONTINUED | OUTPATIENT
Start: 2021-07-15 | End: 2021-07-15 | Stop reason: HOSPADM

## 2021-07-15 RX ORDER — FENTANYL CITRATE 50 UG/ML
25-200 INJECTION, SOLUTION INTRAMUSCULAR; INTRAVENOUS
Status: DISCONTINUED | OUTPATIENT
Start: 2021-07-15 | End: 2021-07-15 | Stop reason: HOSPADM

## 2021-07-15 RX ORDER — NALOXONE HYDROCHLORIDE 0.4 MG/ML
0.4 INJECTION, SOLUTION INTRAMUSCULAR; INTRAVENOUS; SUBCUTANEOUS
Status: DISCONTINUED | OUTPATIENT
Start: 2021-07-15 | End: 2021-07-15 | Stop reason: HOSPADM

## 2021-07-15 RX ORDER — LIDOCAINE HYDROCHLORIDE 20 MG/ML
INJECTION, SOLUTION EPIDURAL; INFILTRATION; INTRACAUDAL; PERINEURAL AS NEEDED
Status: DISCONTINUED | OUTPATIENT
Start: 2021-07-15 | End: 2021-07-15 | Stop reason: HOSPADM

## 2021-07-15 RX ORDER — PHENYLEPHRINE HCL IN 0.9% NACL 0.4MG/10ML
SYRINGE (ML) INTRAVENOUS AS NEEDED
Status: DISCONTINUED | OUTPATIENT
Start: 2021-07-15 | End: 2021-07-15 | Stop reason: HOSPADM

## 2021-07-15 RX ORDER — EPHEDRINE SULFATE/0.9% NACL/PF 50 MG/5 ML
SYRINGE (ML) INTRAVENOUS
Status: COMPLETED
Start: 2021-07-15 | End: 2021-07-15

## 2021-07-15 RX ORDER — DEXTROMETHORPHAN/PSEUDOEPHED 2.5-7.5/.8
1.2 DROPS ORAL
Status: DISCONTINUED | OUTPATIENT
Start: 2021-07-15 | End: 2021-07-15 | Stop reason: HOSPADM

## 2021-07-15 RX ORDER — ATROPINE SULFATE 0.1 MG/ML
0.5 INJECTION INTRAVENOUS
Status: DISCONTINUED | OUTPATIENT
Start: 2021-07-15 | End: 2021-07-15 | Stop reason: HOSPADM

## 2021-07-15 RX ADMIN — Medication 80 MCG: at 10:27

## 2021-07-15 RX ADMIN — PROPOFOL 30 MG: 10 INJECTION, EMULSION INTRAVENOUS at 10:27

## 2021-07-15 RX ADMIN — Medication 120 MCG: at 10:11

## 2021-07-15 RX ADMIN — Medication 120 MCG: at 10:15

## 2021-07-15 RX ADMIN — Medication 80 MCG: at 10:25

## 2021-07-15 RX ADMIN — Medication 10 MG: at 10:22

## 2021-07-15 RX ADMIN — PROPOFOL 30 MG: 10 INJECTION, EMULSION INTRAVENOUS at 10:32

## 2021-07-15 RX ADMIN — PROPOFOL 20 MG: 10 INJECTION, EMULSION INTRAVENOUS at 10:33

## 2021-07-15 RX ADMIN — PROPOFOL 50 MG: 10 INJECTION, EMULSION INTRAVENOUS at 09:50

## 2021-07-15 RX ADMIN — PROPOFOL 20 MG: 10 INJECTION, EMULSION INTRAVENOUS at 10:22

## 2021-07-15 RX ADMIN — PROPOFOL 30 MG: 10 INJECTION, EMULSION INTRAVENOUS at 09:57

## 2021-07-15 RX ADMIN — PROPOFOL 60 MG: 10 INJECTION, EMULSION INTRAVENOUS at 10:20

## 2021-07-15 RX ADMIN — Medication 20 MG: at 10:29

## 2021-07-15 RX ADMIN — PROPOFOL 30 MG: 10 INJECTION, EMULSION INTRAVENOUS at 10:06

## 2021-07-15 RX ADMIN — PROPOFOL 30 MG: 10 INJECTION, EMULSION INTRAVENOUS at 10:10

## 2021-07-15 RX ADMIN — LIDOCAINE HYDROCHLORIDE 80 MG: 20 INJECTION, SOLUTION EPIDURAL; INFILTRATION; INTRACAUDAL; PERINEURAL at 09:47

## 2021-07-15 RX ADMIN — SODIUM CHLORIDE: 900 INJECTION, SOLUTION INTRAVENOUS at 10:05

## 2021-07-15 RX ADMIN — SODIUM CHLORIDE: 900 INJECTION, SOLUTION INTRAVENOUS at 09:46

## 2021-07-15 RX ADMIN — PROPOFOL 100 MG: 10 INJECTION, EMULSION INTRAVENOUS at 09:47

## 2021-07-15 RX ADMIN — PROPOFOL 50 MG: 10 INJECTION, EMULSION INTRAVENOUS at 09:54

## 2021-07-15 RX ADMIN — Medication 10 MG: at 10:32

## 2021-07-15 RX ADMIN — PROPOFOL 30 MG: 10 INJECTION, EMULSION INTRAVENOUS at 10:13

## 2021-07-15 RX ADMIN — Medication 10 MG: at 10:16

## 2021-07-15 RX ADMIN — PROPOFOL 20 MG: 10 INJECTION, EMULSION INTRAVENOUS at 10:01

## 2021-07-15 NOTE — DISCHARGE INSTRUCTIONS
[address]  [address] [facility], [address] [place] [address]    EGD/COLON DISCHARGE INSTRUCTIONS    Olamide Granda  [id number]  1955    Discomfort:  Sore throat- throat lozenges or warm salt water gargle  redness at IV site- apply warm compress to area; if redness or soreness persist- contact your physician  Gaseous discomfort- walking, belching will help relieve any discomfort  You may not operate a vehicle for 12 hours  You may not engage in an occupation involving machinery or appliances for rest of today  You may not drink alcoholic beverages for at least 12 hours  Avoid making any critical decisions for at least 24 hour  DIET  You may resume your regular diet - however -  remember your colon is empty and a heavy meal will produce gas. Avoid these foods:  vegetables, fried / greasy foods, carbonated drinks    ACTIVITY  You may resume your normal daily activities   Spend the remainder of the day resting -  avoid any strenuous activity. CALL M.D. ANY SIGN OF   Increasing pain, nausea, vomiting  Abdominal distension (swelling)  New increased bleeding (oral or rectal)  Fever (chills)  Pain in chest area  Bloody discharge from nose or mouth  Shortness of breath    Follow-up Instructions:   Call Dr. Kimberly Gamez for any questions or problems. Telephone # [phone]    ENDOSCOPY FINDINGS:   Your endoscopy showed gastritis, a hiatal hernia, and mild acid reflux irritation. Biopsies were taken. Your colonoscopy showed 8 polyps, which were removed. We will contact you about the pathology results. You may resume your blood thinner on Sunday (7/18). Signed By: Valeria Jacob. Sherie Apple MD     7/15/2021  10:46 AM         Learning About Coronavirus (COVID-19)  Coronavirus (COVID-19): Overview  What is coronavirus ([facility] The coronavirus disease (COVID-19) is caused by a virus. It is an illness that was first found in [place], [place], in December 2019. It has since spread worldwide.   The virus can cause fever, cough, and trouble breathing. In severe cases, it can cause pneumonia and make it hard to breathe without help. It can cause death. Coronaviruses are a large group of viruses. They cause the common cold. They also cause more serious illnesses like Middle East respiratory syndrome (MERS) and severe acute respiratory syndrome (SARS). COVID-19 is caused by a novel coronavirus. That means it's a new type that has not been seen in people before. This virus spreads person-to-person through droplets from coughing and sneezing. It can also spread when you are close to someone who is infected. And it can spread when you touch something that has the virus on it, such as a doorknob or a tabletop. What can you do to protect yourself from coronavirus (COVID-19)? The best way to protect yourself from getting sick is to:  · Avoid areas where there is an outbreak. · Avoid contact with people who may be infected. · Wash your hands often with soap or alcohol-based hand sanitizers. · Avoid crowds and try to stay at least 6 feet away from other people. · Wash your hands often, especially after you cough or sneeze. Use soap and water, and scrub for at least 20 seconds. If soap and water aren't available, use an alcohol-based hand . · Avoid touching your mouth, nose, and eyes. What can you do to avoid spreading the virus to others? To help avoid spreading the virus to others:  · Cover your mouth with a tissue when you cough or sneeze. Then throw the tissue in the trash. · Use a disinfectant to clean things that you touch often. · Stay home if you are sick or have been exposed to the virus. Don't go to school, work, or public areas. And don't use public transportation. · If you are sick:  ? Leave your home only if you need to get medical care. But call the doctor's office first so they know you're coming.  And wear a face mask, if you have one.  ? If you have a face mask, wear it whenever you're around other people. It can help stop the spread of the virus when you cough or sneeze. ? Clean and disinfect your home every day. Use household  and disinfectant wipes or sprays. Take special care to clean things that you grab with your hands. These include doorknobs, remote controls, phones, and handles on your refrigerator and microwave. And don't forget countertops, tabletops, bathrooms, and computer keyboards. When to call for help  Call 911 anytime you think you may need emergency care. For example, call if:  · You have severe trouble breathing. (You can't talk at all.)  · You have constant chest pain or pressure. · You are severely dizzy or lightheaded. · You are confused or can't think clearly. · Your face and lips have a blue color. · You pass out (lose consciousness) or are very hard to wake up. Call your doctor now if you develop symptoms such as:  · Shortness of breath. · Fever. · Cough. If you need to get care, call ahead to the doctor's office for instructions before you go. Make sure you wear a face mask, if you have one, to prevent exposing other people to the virus. Where can you get the latest information? The following health organizations are tracking and studying this virus. Their websites contain the most up-to-date information. [facility] also learn what to do if you think you may have been exposed to the virus. · U.S. Centers for Disease Control and Prevention (CDC): The CDC provides updated news about the disease and travel advice. The website also tells you how to prevent the spread of infection. www.cdc.gov  · World Health Organization [facility]): WHO offers information about the virus outbreaks. WHO also has travel advice. www.who.int  Current as of: April 1, 2020               Content Version: 12.4  © 2006-2020 Healthwise, Incorporated.    Care instructions adapted under license by your healthcare professional. If you have questions about a medical condition or this instruction, always ask your healthcare professional. [place] [address] any warranty or liability for your use of this information.

## 2021-07-15 NOTE — ROUTINE PROCESS
Leah [facility]  1955  [id number]    Situation:  Verbal report received from: BALTA Sepulveda RN  Procedure: Procedure(s):  ESOPHAGOGASTRODUODENOSCOPY (EGD), COLONOSCOPY  . ESOPHAGOGASTRODUODENAL (EGD) BIOPSY  ENDOSCOPIC POLYPECTOMY  RESOLUTION CLIP  INJECTION    Background:    Preoperative diagnosis: HX OF ESOPHAGEAL CANCER, HX OF COLON POLYPS  Postoperative diagnosis: 1)Gastritis  2)Hiatal Hernia  3)Irregular Z-line  4)Colon Polyps  5)Diverticulosis  6)Internal hemorrhoids    :  Dr. Matt Torres  Assistant(s): Endoscopy Technician-1: Shonna Fajardo  Endoscopy RN-1: Paul Thurman RN    Specimens:   ID Type Source Tests Collected by Time Destination   1 : Gastric Bx Preservative Gastric  Chi Griffin MD 7/15/2021 9583 Pathology   2 : GE junction bx Preservative GE Junction  Chi Griffin MD 7/15/2021 2101 Pathology   3 : Cecal Polyps Preservative Cecum  Chi Griffin MD 7/15/2021 1004 Pathology   4 : Ascending Colon Polyps Preservative Colon, Ascending  Chi Griffin MD 7/15/2021 1022 Pathology     H. Pylori  no    Assessment:  Intra-procedure medications   Anesthesia gave intra-procedure sedation and medications, see anesthesia flow sheet yes    Intravenous fluids: NS@ KVO     Vital signs stable     Abdominal assessment: round and soft     Recommendation:  Discharge patient per MD order.     Family or Friend   Permission to share finding with family or friend yes

## 2021-07-15 NOTE — ANESTHESIA PREPROCEDURE EVALUATION
Relevant Problems   CARDIOVASCULAR   (+) Benign essential HTN   (+) Paroxysmal A-fib (HCC)       Anesthetic History   No history of anesthetic complications            Review of Systems / Medical History  Patient summary reviewed, nursing notes reviewed and pertinent labs reviewed    Pulmonary  Within defined limits                 Neuro/Psych   Within defined limits           Cardiovascular  Within defined limits  Hypertension        Dysrhythmias : atrial fibrillation  CAD         GI/Hepatic/Renal  Within defined limits              Endo/Other  Within defined limits           Other Findings              Physical Exam    Airway  Mallampati: II  TM Distance: > 6 cm  Neck ROM: normal range of motion   Mouth opening: Normal     Cardiovascular  Regular rate and rhythm,  S1 and S2 normal,  no murmur, click, rub, or gallop             Dental  No notable dental hx       Pulmonary  Breath sounds clear to auscultation               Abdominal  GI exam deferred       Other Findings            Anesthetic Plan    ASA: 2  Anesthesia type: MAC          Induction: Intravenous  Anesthetic plan and risks discussed with: Patient

## 2021-07-15 NOTE — H&P
[address]  174 [facility], [address] [address]      History and Physical       NAME:  Claudene Penna. :   1955   MRN:   [id number]             History of Present Illness:  Patient is a 77 y.o. who is seen for history of colon polyps. Last colonoscopy was in 2018 and polyps were removed. He has a family history of esophageal cancer in two first degree relatives. PMH:  Past Medical History:   Diagnosis Date    Acoustic neuroma [facility])     R  Marlon/ns    Colonic polyps 2006    Osorio/gi    Diverticulosis 2006    Encounter for screening colonoscopy 2018    Dr. Dulce Nobles - repeat in 3 years    HTN, goal below 150/90     Hypertrophy of prostate without urinary obstruction and other lower urinary tract symptoms (LUTS)     Dorado/uro    IBS (irritable bowel syndrome)     Lumbar stenosis     severe  Van/os    Other and unspecified hyperlipidemia     Paroxysmal atrial fibrillation (HCC)     ASA for anticoagulation. Beta blocker.  Unstable angina (HCC)     negative stress test 10/2016       PSH:  Past Surgical History:   Procedure Laterality Date    BIOPSY PROSTATE      HX CHOLECYSTECTOMY      HX HERNIA REPAIR         Allergies: Allergies   Allergen Reactions    Ciprofloxacin Myalgia    Codeine Other (comments)     Since allergy to hydrocodone shouldn't take codeine    Hydrocodone Shortness of Breath       Home Medications:  Prior to Admission Medications   Prescriptions Last Dose Informant Patient Reported? Taking? ALPRAZolam (XANAX) 0.5 mg tablet Unknown at Unknown time  No No   Sig: Take 1 tab by mouth once before flight for anxiety   Patient not taking: Reported on 7/15/2021   apixaban (ELIQUIS) 5 mg tablet 2021  No No   Sig: Take 1 Tab by mouth two (2) times a day.    atorvastatin (LIPITOR) 10 mg tablet 2021 at Unknown time  No Yes   Sig: TAKE 1 TABLET DAILY   clobetasoL (TEMOVATE) 0.05 % ointment 2021 at Unknown time  Yes Yes   Sig: Apply 1 Bottle to affected area as needed. dutaseride (AVODART) 0.5 mg capsule 7/14/2021 at Unknown time  Yes Yes   Sig: Take 0.5 mg by mouth daily. metoprolol succinate (TOPROL-XL) 25 mg XL tablet 7/15/2021 at Unknown time  No Yes   Sig: Take 1 Tab by mouth daily. olmesartan (BENICAR) 40 mg tablet 7/14/2021 at Unknown time  No Yes   Sig: Take 1 Tab by mouth daily. Facility-Administered Medications: None       Hospital Medications:  Current Facility-Administered Medications   Medication Dose Route Frequency    0.9% sodium chloride infusion  50 mL/hr IntraVENous CONTINUOUS    sodium chloride (NS) flush 5-40 mL  5-40 mL IntraVENous Q8H    sodium chloride (NS) flush 5-40 mL  5-40 mL IntraVENous PRN    midazolam (VERSED) injection 0.25-5 mg  0.25-5 mg IntraVENous Multiple    fentaNYL citrate (PF) injection  mcg   mcg IntraVENous Multiple    naloxone (NARCAN) injection 0.4 mg  0.4 mg IntraVENous Multiple    flumazeniL (ROMAZICON) 0.1 mg/mL injection 0.2 mg  0.2 mg IntraVENous Multiple    simethicone (MYLICON) 34HY/8.9AM oral drops 80 mg  1.2 mL Oral Multiple    atropine injection 0.5 mg  0.5 mg IntraVENous ONCE PRN    EPINEPHrine (ADRENALIN) 0.1 mg/mL syringe 1 mg  1 mg Endoscopically ONCE PRN       Social History:  Social History     Tobacco Use    Smoking status: Never Smoker    Smokeless tobacco: Never Used   Substance Use Topics    Alcohol use:  Yes     Alcohol/week: 0.0 standard drinks     Comment: occasional       Family History:  Family History   Problem Relation Age of Onset    Diabetes Mother     Hypertension Mother     Cancer Mother         esophageal    Diabetes Father     Hypertension Father     Diabetes Brother     Hypertension Brother     Stroke Maternal Grandfather              Review of Systems:      Constitutional: negative fever, negative chills, negative weight loss  Eyes:   negative visual changes  ENT:   negative sore throat, tongue or lip swelling  Respiratory:  negative cough, negative dyspnea  Cards:  negative for chest pain, palpitations, lower extremity edema  GI:   See HPI  :  negative for frequency, dysuria  Integument:  negative for rash and pruritus  Heme:  negative for easy bruising and gum/nose bleeding  Musculoskel: negative for myalgias,  back pain and muscle weakness  Neuro: negative for headaches, dizziness, vertigo  Psych:  negative for feelings of anxiety, depression       Objective:     Patient Vitals for the past 8 hrs:   BP Temp Pulse Resp SpO2 Height Weight   07/15/21 0927 129/88 98.2 °F (36.8 °C) 80 16 100 %     07/15/21 0914      5' 11\" (1.803 m) 79.4 kg (175 lb)     No intake/output data recorded. No intake/output data recorded. EXAM:     NEURO-a&o   HEENT-wnl   LUNGS-clear    COR-regular rate and rhythym     ABD-soft , no tenderness, no rebound, good bs     EXT-no edema     Data Review     No results for input(s): WBC, HGB, HCT, PLT, HGBEXT, HCTEXT, PLTEXT in the last 72 hours. No results for input(s): NA, K, CL, CO2, BUN, CREA, GLU, PHOS, CA in the last 72 hours. No results for input(s): AP, TBIL, TP, ALB, GLOB, GGT, AML, LPSE in the last 72 hours. No lab exists for component: SGOT, GPT, AMYP, HLPSE  No results for input(s): INR, PTP, APTT, INREXT in the last 72 hours. Assessment:     · History of colon polyps  · Family history of esophageal cancer     Patient Active Problem List   Diagnosis Code    Lumbar stenosis M48.061    History of BPH Z87.438    IBS (irritable bowel syndrome) K58.9    Diverticulosis K57.90    Colon polyp K63.5    History of acoustic neuroma Z86.018    Paroxysmal A-fib ([address] I48.0    Benign essential HTN I10    Mixed hyperlipidemia E78.2     Plan:   · The patient was counseled at length about the risks of cristobal Covid-19 in the joelle-operative and post-operative states including the recovery window of their procedure.   The patient was made aware that cristobal Covid-19 after a surgical procedure may worsen their prognosis for recovering from the virus and lend to a higher morbidity and or mortality risk. The patient was given the options of postponing their procedure. All of the risks, benefits, and alternatives were discussed. The patient does wish to proceed with the procedure. · Endoscopic procedure with MAC     Signed By: Lux Calderon.  Rhonda Raymundo MD     7/15/2021  9:41 AM

## 2021-07-15 NOTE — PROCEDURES
[address]  [address] [facility], [address] [facility] (EGD) Procedure Note    Afsaneh Child  1955  [id number]      Procedure: Endoscopic Gastroduodenoscopy --diagnostic, with biopsy    Indication:  family history of esophageal cancer     Pre-operative Diagnosis: see indication above    Post-operative Diagnosis: see findings below    : Marquise Bella MD    Staff: Endoscopy Technician-1: Mattie Valente  Endoscopy RN-1: Yi Winston RN     Referring Provider:  William Payton MD      Anesthesia/Sedation:  MAC anesthesia Propofol        Procedure Details     After informed consent was obtained for the procedure, with all risks and benefits of procedure explained the patient was taken to the endoscopy suite and placed in the left lateral decubitus position. Following sequential administration of sedation as per above, the endoscope was inserted into the mouth and advanced under direct vision to second portion of the duodenum. A careful inspection was made as the gastroscope was withdrawn, including a retroflexed view of the proximal stomach; findings and interventions are described below. Findings:   Esophagus: irregular Z line - cold biopsied  Stomach: 3 cm hiatal hernia, patchy erythema of the antrum - cold biopsies taken  Duodenum: normal to second portion      Therapies:  none    Specimens: 1. Gastric, 2. GE junction         EBL: None      Complications:   None; patient tolerated the procedure well. Impression:    As above    Recommendations:  1. Follow up surgical pathology  2. Proceed to colonoscopy    Signed By: Marquise Bella MD     7/15/2021  10:47 AM

## 2021-07-15 NOTE — PROCEDURES
[address], [address] [place] [address]      Colonoscopy Operative Report    Que Garcia  [id number]  1955      Procedure Type:   Colonoscopy with endoscopic mucosal resection, polypectomy    Indications:  History of colon polyps        Pre-operative Diagnosis: see indication above    Post-operative Diagnosis:  See findings below    :  Mamta Jennings. Adri Gunn MD    Staff: Endoscopy Technician-1: Yifan Saldaña  Endoscopy RN-1: Gamal Bobo RN     Referring Provider: Paris Quesada MD      Sedation:  MAC anesthesia Propofol      Procedure Details:  After informed consent was obtained with all risks and benefits of procedure explained and preoperative exam completed, the patient was taken to the endoscopy suite and placed in the left lateral decubitus position. Upon sequential sedation as per above, a digital rectal exam was performed demonstrating internal hemorrhoids. The Olympus pediatric videocolonoscope  was inserted in the rectum and carefully advanced to the cecum, which was identified by the ileocecal valve and appendiceal orifice. The cecum was identified by the ileocecal valve and appendiceal orifice. The quality of preparation was good. The colonoscope was slowly withdrawn with careful evaluation between folds. Retroflexion in the rectum was completed . Findings:   Rectum: normal  Sigmoid: mild diverticulosis  Descending Colon: mild diverticulosis  Transverse Colon: normal  Ascending Colon: Five polyps were removed. Two were 15 mm and sessile. Both successfully lifted with ORISE gel lifting agent. The blue dye component clearly demarcated polyp borders. Both removed with 13 mm stiff hexagonal snare. Mucosal defects closed with Resolution 360 clips. Another three sessile polyps (7-8 mm each) were removed with hot snare polypectomy. Mucosal defects with closed with Resolution 360 clips.  A total of 5 clips were placed  Cecum: three sessile 5-8 mm polyps removed with hot snare. Mucosal defects closed with single Resolution 360 clip at each site (total 3)  Terminal Ileum: not intubated      Specimen Removed:  1. Cecal polyps, 2. Ascending colon polyps    Complications: None. EBL:  None. Impression:     1. Multiple colon polyps - removed as above, including endoscopic mucosal resection  2. Mild left-sided diverticulosis  3. Small internal hemorrhoids    Recommendations:  1. Follow up surgical pathology  2. Repeat colonoscopy in 3 years  3. High fiber diet education  4. Restart anticoagulation on 7/18/21    Signed By: Mana Cano.  Reese Brown MD     7/15/2021  10:49 AM

## 2021-07-15 NOTE — ANESTHESIA POSTPROCEDURE EVALUATION
Procedure(s):  ESOPHAGOGASTRODUODENOSCOPY (EGD), COLONOSCOPY  . ESOPHAGOGASTRODUODENAL (EGD) BIOPSY  ENDOSCOPIC POLYPECTOMY  RESOLUTION CLIP  INJECTION. MAC    Anesthesia Post Evaluation      Multimodal analgesia: multimodal analgesia not used between 6 hours prior to anesthesia start to PACU discharge  Patient location during evaluation: PACU  Patient participation: complete - patient participated  Level of consciousness: awake  Pain score: 0  Pain management: adequate  Airway patency: patent  Anesthetic complications: no  Cardiovascular status: acceptable  Respiratory status: acceptable  Hydration status: acceptable  Comments: I have evaluated the patient and meets criteria for discharge from PACU. Jessica Rodriguez MD    Final Post Anesthesia Temperature Assessment:  Normothermia (36.0-37.5 degrees C)      INITIAL Post-op Vital signs:   Vitals Value Taken Time   /67 07/15/21 1055   Temp 36.2 °C (97.1 °F) 07/15/21 1040   Pulse 74 07/15/21 1056   Resp 15 07/15/21 1056   SpO2 95 % 07/15/21 1056   Vitals shown include unvalidated device data.

## 2021-07-15 NOTE — PERIOP NOTES

## 2021-11-10 ENCOUNTER — OFFICE VISIT (OUTPATIENT)
Dept: CARDIOLOGY CLINIC | Age: 66
End: 2021-11-10
Payer: MEDICARE

## 2021-11-10 VITALS
RESPIRATION RATE: 16 BRPM | HEART RATE: 60 BPM | OXYGEN SATURATION: 97 % | BODY MASS INDEX: 24.92 KG/M2 | HEIGHT: 71 IN | SYSTOLIC BLOOD PRESSURE: 110 MMHG | WEIGHT: 178 LBS | DIASTOLIC BLOOD PRESSURE: 80 MMHG

## 2021-11-10 DIAGNOSIS — Z79.01 CHRONIC ANTICOAGULATION: ICD-10-CM

## 2021-11-10 DIAGNOSIS — I10 BENIGN ESSENTIAL HTN: ICD-10-CM

## 2021-11-10 DIAGNOSIS — I48.0 PAROXYSMAL A-FIB (HCC): Primary | ICD-10-CM

## 2021-11-10 DIAGNOSIS — E78.2 MIXED HYPERLIPIDEMIA: ICD-10-CM

## 2021-11-10 PROCEDURE — 93005 ELECTROCARDIOGRAM TRACING: CPT | Performed by: INTERNAL MEDICINE

## 2021-11-10 PROCEDURE — 99214 OFFICE O/P EST MOD 30 MIN: CPT | Performed by: INTERNAL MEDICINE

## 2021-11-10 PROCEDURE — 93010 ELECTROCARDIOGRAM REPORT: CPT | Performed by: INTERNAL MEDICINE

## 2021-11-10 PROCEDURE — G8420 CALC BMI NORM PARAMETERS: HCPCS | Performed by: INTERNAL MEDICINE

## 2021-11-10 PROCEDURE — G8754 DIAS BP LESS 90: HCPCS | Performed by: INTERNAL MEDICINE

## 2021-11-10 PROCEDURE — G8510 SCR DEP NEG, NO PLAN REQD: HCPCS | Performed by: INTERNAL MEDICINE

## 2021-11-10 PROCEDURE — G8752 SYS BP LESS 140: HCPCS | Performed by: INTERNAL MEDICINE

## 2021-11-10 PROCEDURE — 3017F COLORECTAL CA SCREEN DOC REV: CPT | Performed by: INTERNAL MEDICINE

## 2021-11-10 PROCEDURE — G0463 HOSPITAL OUTPT CLINIC VISIT: HCPCS | Performed by: INTERNAL MEDICINE

## 2021-11-10 PROCEDURE — G8536 NO DOC ELDER MAL SCRN: HCPCS | Performed by: INTERNAL MEDICINE

## 2021-11-10 PROCEDURE — G8427 DOCREV CUR MEDS BY ELIG CLIN: HCPCS | Performed by: INTERNAL MEDICINE

## 2021-11-10 PROCEDURE — 1101F PT FALLS ASSESS-DOCD LE1/YR: CPT | Performed by: INTERNAL MEDICINE

## 2021-11-10 NOTE — PROGRESS NOTES
[address]: Dee Grissom  [phone]    History of Present Illness:  Mr. Amber Shin is a 76 yo M with hypertension, borderline hyperlipidemia seen in past for an abnormal EKG with Q waves in V1 and V2 with question of old anteroseptal infarct. Echo 2015 was normal with no infarct. Since his last visit, he continues to do well. He just had rare palpitations in April, but none since then. He had a colonoscopy and polyps were taken, but these were okay. He always has polyps when he does colonoscopies and has been getting them checked every three years. He denies any exertional chest pain or shortness of breath. He has been exercising regularly and his weight is down from 183 to 178 pounds. He has been going to [place] and is hoping to get back to [facility] sometime next year. He is compensated on exam with clear lungs and no lower extremity edema. His blood pressure and heart rate were normal.  His EKG was normal sinus rhythm, heart rate of 66. Soc hx. No tobacco.  Fam hx. No early CAD. Assessment and Plan:   1. Paroxysmal atrial fibrillation, PVCs. Stable in sinus rhythm and well controlled on beta blocker. He does have just rare palpitations. We talked in the past he could take an extra Toprol if he has a flare. We did have him see Dr. Tammie Gonzales for consideration of ablation if his atrial fibrillation was hard to control despite medical therapy, but it has not been an issue. We will have him follow back in six months. 2. Chronic anticoagulation. No bleeding issues on Eliquis. 3. Mixed hyperlipidemia. 4. Essential hypertension. Blood pressure is controlled. 5. Colon polyps. He is getting colonoscopies every three years and has had benign polyps.         He  has a past medical history of Acoustic neuroma ([facility] [address] (2012), Colonic polyps (2006), Diverticulosis (2006), Encounter for screening colonoscopy (07/11/2018), HTN, goal below 150/90, Hypertrophy of prostate without urinary obstruction and other lower urinary tract symptoms (LUTS), IBS (irritable bowel syndrome), Lumbar stenosis, Other and unspecified hyperlipidemia (7/11), Paroxysmal atrial fibrillation ([facility] and Unstable angina ([facility] All other systems negative except as above. PE  Vitals:    11/10/21 0916   BP: 110/80   Pulse: 60   Resp: 16   SpO2: 97%   Weight: 178 lb (80.7 kg)   Height: 5' 11\" (1.803 m)    Body mass index is 24.83 kg/m².    General appearance - alert, well appearing, and in no distress  Mental status - affect appropriate to mood  Eyes - sclera anicteric, moist mucous membranes  Neck - supple, no significant adenopathy, no JVD  Chest - clear to auscultation, no wheezes, rales or rhonchi  Heart - normal rate, regular rhythm, normal S1, S2, no murmurs, rubs, clicks or gallops  Abdomen - soft, nontender, nondistended, no masses or organomegaly  Neurological - no focal deficit  Extremities - peripheral pulses normal, no pedal edema      Recent Labs:  Lab Results   Component Value Date/Time    Cholesterol, total 146 09/16/2020 07:54 AM    HDL Cholesterol 54 09/16/2020 07:54 AM    LDL, calculated 78 09/16/2020 07:54 AM    LDL, calculated 60 09/21/2019 09:15 AM    Triglyceride 71 09/16/2020 07:54 AM    CHOL/HDL Ratio 3.6 03/29/2010 10:01 AM     Lab Results   Component Value Date/Time    Creatinine (POC) 0.9 03/24/2012 09:20 AM    Creatinine 0.90 11/04/2020 01:57 PM     Lab Results   Component Value Date/Time    BUN 17 11/04/2020 01:57 PM     Lab Results   Component Value Date/Time    Potassium 4.1 11/04/2020 01:57 PM     Lab Results   Component Value Date/Time    Hemoglobin A1c 5.6 09/16/2020 07:54 AM     Lab Results   Component Value Date/Time    HGB 16.5 11/04/2020 01:57 PM     Lab Results   Component Value Date/Time    PLATELET 457 03/74/9582 01:57 PM       Reviewed:  Past Medical History:   Diagnosis Date    Acoustic neuroma ([facility] 2012    R  Marlon/ns    Colonic polyps 2006    Osorio/gi    Diverticulosis 2006    Encounter for screening colonoscopy 07/11/2018    Dr. Kristina Verdugo - repeat in 3 years    HTN, goal below 150/90     Hypertrophy of prostate without urinary obstruction and other lower urinary tract symptoms (LUTS)     Dorado/uro    IBS (irritable bowel syndrome)     Lumbar stenosis     severe  Van/os    Other and unspecified hyperlipidemia 7/11    Paroxysmal atrial fibrillation (HCC)     ASA for anticoagulation. Beta blocker.  Unstable angina (HCC)     negative stress test 10/2016     Social History     Tobacco Use   Smoking Status Never Smoker   Smokeless Tobacco Never Used     Social History     Substance and Sexual Activity   Alcohol Use Yes    Alcohol/week: 0.0 standard drinks    Comment: occasional     Allergies   Allergen Reactions    Ciprofloxacin Myalgia    Codeine Other (comments)     Since allergy to hydrocodone shouldn't take codeine    Hydrocodone Shortness of Breath       Current Outpatient Medications   Medication Sig    metoprolol succinate (TOPROL-XL) 25 mg XL tablet Take 1 Tab by mouth daily.  apixaban (ELIQUIS) 5 mg tablet Take 1 Tab by mouth two (2) times a day.  olmesartan (BENICAR) 40 mg tablet Take 1 Tab by mouth daily.  atorvastatin (LIPITOR) 10 mg tablet TAKE 1 TABLET DAILY    clobetasoL (TEMOVATE) 0.05 % ointment Apply 1 Bottle to affected area as needed.  dutaseride (AVODART) 0.5 mg capsule Take 0.5 mg by mouth daily.  ALPRAZolam (XANAX) 0.5 mg tablet Take 1 tab by mouth once before flight for anxiety (Patient not taking: Reported on 7/15/2021)     No current facility-administered medications for this visit.        Mallorie Alejandra MD  [facility] [place]  [address] [facility] [address]  [address]

## 2021-11-10 NOTE — LETTER
Patient:  Danish Osorio. YOB: 1955  Date of Visit: 11/10/2021      Dear MD Vignesh [address]  [address] [address]  Via Fax: [phone]:      Mr. Arcadio Lara is a 78 yo M with hypertension, borderline hyperlipidemia seen in past for an abnormal EKG with Q waves in V1 and V2 with question of old anteroseptal infarct. Echo 2015 was normal with no infarct. Since his last visit, he continues to do well. He just had rare palpitations in April, but none since then. He had a colonoscopy and polyps were taken, but these were okay. He always has polyps when he does colonoscopies and has been getting them checked every three years. He denies any exertional chest pain or shortness of breath. He has been exercising regularly and his weight is down from 183 to 178 pounds. He has been going to [place] and is hoping to get back to [facility] sometime next year. He is compensated on exam with clear lungs and no lower extremity edema. His blood pressure and heart rate were normal.  His EKG was normal sinus rhythm, heart rate of 66. Soc hx. No tobacco.  Fam hx. No early CAD. Assessment and Plan:   1. Paroxysmal atrial fibrillation, PVCs. Stable in sinus rhythm and well controlled on beta blocker. He does have just rare palpitations. We talked in the past he could take an extra Toprol if he has a flare. We did have him see Dr. Carlos Christianson for consideration of ablation if his atrial fibrillation was hard to control despite medical therapy, but it has not been an issue. We will have him follow back in six months. 2. Chronic anticoagulation. No bleeding issues on Eliquis. 3. Mixed hyperlipidemia. 4. Essential hypertension. Blood pressure is controlled. 5. Colon polyps. He is getting colonoscopies every three years and has had benign polyps. If you have questions, please do not hesitate to call me.     Sincerely,      Sydnie Waite MD

## 2022-01-06 RX ORDER — APIXABAN 5 MG/1
TABLET, FILM COATED ORAL
Qty: 180 TABLET | Refills: 3 | Status: SHIPPED | OUTPATIENT
Start: 2022-01-06

## 2022-01-06 RX ORDER — OLMESARTAN MEDOXOMIL 40 MG/1
TABLET ORAL
Qty: 90 TABLET | Refills: 3 | Status: SHIPPED | OUTPATIENT
Start: 2022-01-06

## 2022-01-06 RX ORDER — METOPROLOL SUCCINATE 25 MG/1
TABLET, EXTENDED RELEASE ORAL
Qty: 90 TABLET | Refills: 3 | Status: SHIPPED | OUTPATIENT
Start: 2022-01-06

## 2022-01-06 NOTE — TELEPHONE ENCOUNTER
Requested Prescriptions     Signed Prescriptions Disp Refills    olmesartan (BENICAR) 40 mg tablet 90 Tablet 3     Sig: TAKE 1 TABLET DAILY     Authorizing Provider: Kevin Whiting     Ordering User: Rodney Lord    metoprolol succinate (TOPROL-XL) 25 mg XL tablet 90 Tablet 3     Sig: TAKE 1 TABLET DAILY     Authorizing Provider: Kevin Whiting     Ordering User: Rodney Lord    Eliquis 5 mg tablet 180 Tablet 3     Sig: TAKE 1 TABLET TWICE A DAY     Authorizing Provider: Kevin Whiting     Ordering User: Rodney Lord       Last office visit 11/10/21    Per Dr. Lady Pollack   Date Time Provider [facility]   5/10/2022  9:40 AM MD [facility]

## 2022-03-19 PROBLEM — I48.0 PAROXYSMAL A-FIB (HCC): Status: ACTIVE | Noted: 2017-04-19

## 2022-03-19 PROBLEM — I10 BENIGN ESSENTIAL HTN: Status: ACTIVE | Noted: 2017-04-19

## 2022-03-19 PROBLEM — E78.2 MIXED HYPERLIPIDEMIA: Status: ACTIVE | Noted: 2017-04-19

## 2022-04-08 LAB — HBA1C MFR BLD HPLC: 5.5 %

## 2022-05-10 ENCOUNTER — OFFICE VISIT (OUTPATIENT)
Dept: CARDIOLOGY CLINIC | Age: 67
End: 2022-05-10
Payer: MEDICARE

## 2022-05-10 VITALS
HEART RATE: 64 BPM | SYSTOLIC BLOOD PRESSURE: 130 MMHG | RESPIRATION RATE: 16 BRPM | HEIGHT: 71 IN | BODY MASS INDEX: 24.64 KG/M2 | WEIGHT: 176 LBS | OXYGEN SATURATION: 97 % | DIASTOLIC BLOOD PRESSURE: 82 MMHG

## 2022-05-10 DIAGNOSIS — Z79.01 CHRONIC ANTICOAGULATION: ICD-10-CM

## 2022-05-10 DIAGNOSIS — I48.0 PAROXYSMAL A-FIB (HCC): Primary | ICD-10-CM

## 2022-05-10 DIAGNOSIS — I10 BENIGN ESSENTIAL HTN: ICD-10-CM

## 2022-05-10 DIAGNOSIS — E78.2 MIXED HYPERLIPIDEMIA: ICD-10-CM

## 2022-05-10 PROCEDURE — G0463 HOSPITAL OUTPT CLINIC VISIT: HCPCS | Performed by: INTERNAL MEDICINE

## 2022-05-10 PROCEDURE — 1101F PT FALLS ASSESS-DOCD LE1/YR: CPT | Performed by: INTERNAL MEDICINE

## 2022-05-10 PROCEDURE — G8427 DOCREV CUR MEDS BY ELIG CLIN: HCPCS | Performed by: INTERNAL MEDICINE

## 2022-05-10 PROCEDURE — G8510 SCR DEP NEG, NO PLAN REQD: HCPCS | Performed by: INTERNAL MEDICINE

## 2022-05-10 PROCEDURE — 3017F COLORECTAL CA SCREEN DOC REV: CPT | Performed by: INTERNAL MEDICINE

## 2022-05-10 PROCEDURE — G8420 CALC BMI NORM PARAMETERS: HCPCS | Performed by: INTERNAL MEDICINE

## 2022-05-10 PROCEDURE — G8536 NO DOC ELDER MAL SCRN: HCPCS | Performed by: INTERNAL MEDICINE

## 2022-05-10 PROCEDURE — G8754 DIAS BP LESS 90: HCPCS | Performed by: INTERNAL MEDICINE

## 2022-05-10 PROCEDURE — G8752 SYS BP LESS 140: HCPCS | Performed by: INTERNAL MEDICINE

## 2022-05-10 PROCEDURE — 99214 OFFICE O/P EST MOD 30 MIN: CPT | Performed by: INTERNAL MEDICINE

## 2022-05-10 RX ORDER — MULTIVITAMIN
TABLET ORAL
COMMUNITY

## 2022-05-10 RX ORDER — CALCIUM CARBONATE 500(1250)
TABLET ORAL DAILY
COMMUNITY

## 2022-05-10 RX ORDER — CYANOCOBALAMIN (VITAMIN B-12) 1000 MCG
TABLET, EXTENDED RELEASE ORAL
COMMUNITY

## 2022-05-10 RX ORDER — LANOLIN ALCOHOL/MO/W.PET/CERES
1000 CREAM (GRAM) TOPICAL DAILY
COMMUNITY

## 2022-05-10 RX ORDER — CHOLECALCIFEROL (VITAMIN D3) 125 MCG
CAPSULE ORAL
COMMUNITY

## 2022-05-10 RX ORDER — GLUCOSAMINE/CHONDR SU A SOD 750-600 MG
TABLET ORAL 2 TIMES DAILY
COMMUNITY

## 2022-05-10 RX ORDER — DOCUSATE SODIUM 100 MG/1
100 CAPSULE, LIQUID FILLED ORAL 2 TIMES DAILY
COMMUNITY

## 2022-05-10 NOTE — PROGRESS NOTES
[address]: Donavan Makc  [phone]    History of Present Illness:  Mr. Mara Molina is a 76 yo M with hypertension, borderline hyperlipidemia seen in past for an abnormal EKG with Q waves in V1 and V2 with question of old anteroseptal infarct. Echo 2015 was normal with no infarct. Since his last visit, overall he has been doing well. He denies any exertional chest pain, shortness of breath, palpitations, lightheadedness or dizziness. He is exercising regularly. He did ask about the WATCHMAN. For him, this would not be indicated because he has not had any significant bleeding issues. He is compensated on exam with clear lungs and no lower extremity edema. Soc hx. No tobacco.  Fam hx. No early CAD. Assessment and Plan:   1. Paroxysmal atrial fibrillation, PVCs. Stable in sinus rhythm and well controlled on beta blocker. We did talk in the past he could take extra Toprol if he has a flare. We did have him see Dr. Jarad Guerrero for consideration of ablation for his atrial fibrillation that at the time was hard to control despite medical therapy, but it has been okay now. Will have him follow back in six months. 2. Chronic anticoagulation. No bleeding issues on Eliquis. As noted above, had a discussion about WATCHMAN, but it is not indicated. 3. Mixed hyperlipidemia. 4. Essential hypertension. Blood pressure is controlled. 5. Colon polyps. He gets colonoscopies every three years and has had benign polyps. He  has a past medical history of Acoustic neuroma ([address] (2012), Colonic polyps (2006), Diverticulosis (2006), Encounter for screening colonoscopy (07/11/2018), HTN, goal below 150/90, Hypertrophy of prostate without urinary obstruction and other lower urinary tract symptoms (LUTS), IBS (irritable bowel syndrome), Lumbar stenosis, Other and unspecified hyperlipidemia (7/11), Paroxysmal atrial fibrillation ([address] and Unstable angina ([address] All other systems negative except as above. PE  Vitals:    05/10/22 0938   BP: 130/82   Pulse: 64   Resp: 16   SpO2: 97%   Weight: 176 lb (79.8 kg)   Height: 5' 11\" (1.803 m)    Body mass index is 24.55 kg/m².    General appearance - alert, well appearing, and in no distress  Mental status - affect appropriate to mood  Eyes - sclera anicteric, moist mucous membranes  Neck - supple, no significant adenopathy, no JVD  Chest - clear to auscultation, no wheezes, rales or rhonchi  Heart - normal rate, regular rhythm, normal S1, S2, no murmurs, rubs, clicks or gallops  Abdomen - soft, nontender, nondistended, no masses or organomegaly  Neurological - no focal deficit  Extremities - peripheral pulses normal, no pedal edema      Recent Labs:  Lab Results   Component Value Date/Time    Cholesterol, total 146 09/16/2020 07:54 AM    HDL Cholesterol 54 09/16/2020 07:54 AM    LDL, calculated 78 09/16/2020 07:54 AM    LDL, calculated 60 09/21/2019 09:15 AM    Triglyceride 71 09/16/2020 07:54 AM    CHOL/HDL Ratio 3.6 03/29/2010 10:01 AM     Lab Results   Component Value Date/Time    Creatinine (POC) 0.9 03/24/2012 09:20 AM    Creatinine 0.90 11/04/2020 01:57 PM     Lab Results   Component Value Date/Time    BUN 17 11/04/2020 01:57 PM     Lab Results   Component Value Date/Time    Potassium 4.1 11/04/2020 01:57 PM     Lab Results   Component Value Date/Time    Hemoglobin A1c 5.6 09/16/2020 07:54 AM     Lab Results   Component Value Date/Time    HGB 16.5 11/04/2020 01:57 PM     Lab Results   Component Value Date/Time    PLATELET 488 58/46/6115 01:57 PM       Reviewed:  Past Medical History:   Diagnosis Date    Acoustic neuroma ([facility] [address] 2012    R  Marlon/ns    Colonic polyps 2006    Osorio/gi    Diverticulosis 2006    Encounter for screening colonoscopy 07/11/2018    Dr. Osborne Reveal - repeat in 3 years    HTN, goal below 150/90     Hypertrophy of prostate without urinary obstruction and other lower urinary tract symptoms (LUTS)     Dorado/uro    IBS (irritable bowel syndrome)     Lumbar stenosis     severe  Van/os    Other and unspecified hyperlipidemia 7/11    Paroxysmal atrial fibrillation (HCC)     ASA for anticoagulation. Beta blocker.  Unstable angina (HCC)     negative stress test 10/2016     Social History     Tobacco Use   Smoking Status Never Smoker   Smokeless Tobacco Never Used     Social History     Substance and Sexual Activity   Alcohol Use Yes    Alcohol/week: 2.0 standard drinks    Types: 2 Glasses of wine per week    Comment: occasional     Allergies   Allergen Reactions    Ciprofloxacin Myalgia    Codeine Other (comments)     Since allergy to hydrocodone shouldn't take codeine    Hydrocodone Shortness of Breath       Current Outpatient Medications   Medication Sig    cholecalciferol, vitamin D3, (Vitamin D3) 50 mcg (2,000 unit) tab Take  by mouth.  cinnamon bark (Cinnamon) 500 mg cap Take  by mouth.  garlic 3010 mg tab Take  by mouth.  zinc 50 mg tab tablet Take  by mouth daily.  selenium 200 mcg cap Take  by mouth.  glucosamine/chondr becerril A sod (glucosamine-chondroitin) 750-600 mg tab Take  by mouth two (2) times a day.  fish oil-omega-3 fatty acids 340-1,000 mg capsule Take 1 Capsule by mouth daily.  cyanocobalamin 1,000 mcg tablet Take 1,000 mcg by mouth daily.  calcium carbonate (OS-ANTHONY) 500 mg calcium (1,250 mg) tablet Take  by mouth daily.  docusate sodium (Colace) 100 mg capsule Take 100 mg by mouth two (2) times a day. [place] brand    olmesartan (BENICAR) 40 mg tablet TAKE 1 TABLET DAILY    metoprolol succinate (TOPROL-XL) 25 mg XL tablet TAKE 1 TABLET DAILY    Eliquis 5 mg tablet TAKE 1 TABLET TWICE A DAY    atorvastatin (LIPITOR) 10 mg tablet TAKE 1 TABLET DAILY    clobetasoL (TEMOVATE) 0.05 % ointment Apply 1 Bottle to affected area as needed.  ALPRAZolam (XANAX) 0.5 mg tablet Take 1 tab by mouth once before flight for anxiety    dutaseride (AVODART) 0.5 mg capsule Take 0.5 mg by mouth daily.      No current facility-administered medications for this visit.        Tevin Jones MD  [facility] [place]  [address] [facility] [address]  [address]

## 2022-05-10 NOTE — LETTER
Patient:  Andres Parker. YOB: 1955  Date of Visit: 5/10/2022      Dear Chintan Romero MD  [address]  [address] [address]  Via Fax: [phone]:      Mr. Zeferino Mcgee is a 76 yo M with hypertension, borderline hyperlipidemia seen in past for an abnormal EKG with Q waves in V1 and V2 with question of old anteroseptal infarct. Echo 2015 was normal with no infarct. Since his last visit, overall he has been doing well. He denies any exertional chest pain, shortness of breath, palpitations, lightheadedness or dizziness. He is exercising regularly. He did ask about the WATCHMAN. For him, this would not be indicated because he has not had any significant bleeding issues. He is compensated on exam with clear lungs and no lower extremity edema. Soc hx. No tobacco.  Fam hx. No early CAD. Assessment and Plan:   1. Paroxysmal atrial fibrillation, PVCs. Stable in sinus rhythm and well controlled on beta blocker. We did talk in the past he could take extra Toprol if he has a flare. We did have him see Dr. Bacilio Martinez for consideration of ablation for his atrial fibrillation that at the time was hard to control despite medical therapy, but it has been okay now. Will have him follow back in six months. 2. Chronic anticoagulation. No bleeding issues on Eliquis. As noted above, had a discussion about WATCHMAN, but it is not indicated. 3. Mixed hyperlipidemia. 4. Essential hypertension. Blood pressure is controlled. 5. Colon polyps. He gets colonoscopies every three years and has had benign polyps. If you have questions, please do not hesitate to call me.      Sincerely,      Elma Ayala MD

## 2022-10-25 ENCOUNTER — TRANSCRIBE ORDER (OUTPATIENT)
Dept: SCHEDULING | Age: 67
End: 2022-10-25

## 2022-10-25 DIAGNOSIS — M62.562 MUSCLE WASTING AND ATROPHY, NOT ELSEWHERE CLASSIFIED, LEFT LOWER LEG: Primary | ICD-10-CM

## 2022-11-02 ENCOUNTER — HOSPITAL ENCOUNTER (OUTPATIENT)
Dept: MRI IMAGING | Age: 67
Discharge: HOME | End: 2022-11-02
Attending: FAMILY MEDICINE
Payer: MEDICARE

## 2022-11-02 DIAGNOSIS — M62.562 MUSCLE WASTING AND ATROPHY, NOT ELSEWHERE CLASSIFIED, LEFT LOWER LEG: ICD-10-CM

## 2022-11-02 PROCEDURE — 72148 MRI LUMBAR SPINE W/O DYE: CPT

## 2022-11-04 ENCOUNTER — OFFICE VISIT (OUTPATIENT)
Dept: ORTHOPEDIC SURGERY | Age: 67
End: 2022-11-04
Payer: MEDICARE

## 2022-11-04 VITALS — BODY MASS INDEX: 22.75 KG/M2 | HEIGHT: 72 IN | WEIGHT: 168 LBS

## 2022-11-04 DIAGNOSIS — G89.29 CHRONIC BILATERAL LOW BACK PAIN WITHOUT SCIATICA: Primary | ICD-10-CM

## 2022-11-04 DIAGNOSIS — M54.50 CHRONIC BILATERAL LOW BACK PAIN WITHOUT SCIATICA: Primary | ICD-10-CM

## 2022-11-04 PROCEDURE — G8420 CALC BMI NORM PARAMETERS: HCPCS | Performed by: ORTHOPAEDIC SURGERY

## 2022-11-04 PROCEDURE — G8536 NO DOC ELDER MAL SCRN: HCPCS | Performed by: ORTHOPAEDIC SURGERY

## 2022-11-04 PROCEDURE — 3017F COLORECTAL CA SCREEN DOC REV: CPT | Performed by: ORTHOPAEDIC SURGERY

## 2022-11-04 PROCEDURE — G8756 NO BP MEASURE DOC: HCPCS | Performed by: ORTHOPAEDIC SURGERY

## 2022-11-04 PROCEDURE — G8427 DOCREV CUR MEDS BY ELIG CLIN: HCPCS | Performed by: ORTHOPAEDIC SURGERY

## 2022-11-04 PROCEDURE — 99214 OFFICE O/P EST MOD 30 MIN: CPT | Performed by: ORTHOPAEDIC SURGERY

## 2022-11-04 PROCEDURE — 1101F PT FALLS ASSESS-DOCD LE1/YR: CPT | Performed by: ORTHOPAEDIC SURGERY

## 2022-11-04 PROCEDURE — 1123F ACP DISCUSS/DSCN MKR DOCD: CPT | Performed by: ORTHOPAEDIC SURGERY

## 2022-11-04 PROCEDURE — G8432 DEP SCR NOT DOC, RNG: HCPCS | Performed by: ORTHOPAEDIC SURGERY

## 2022-11-04 NOTE — PROGRESS NOTES
1. Have you been to the ER, urgent care clinic since your last visit? Hospitalized since your last visit? No    2. Have you seen or consulted any other health care providers outside of the [address] [place] [address] since your last visit? Include any pap smears or colon screening.  No    Chief Complaint   Patient presents with    Back Pain     Low Back, Left Leg Weakness, Lumbar MRI 11/02/22

## 2022-11-04 NOTE — PROGRESS NOTES
Rocio Bowen (: 1955) is a 79 y.o. male, patient, here for evaluation of the following chief complaint(s):  Back Pain (Low Back, Left Leg Weakness, Lumbar MRI 22)       ASSESSMENT/PLAN:    Below is the assessment and plan developed based on review of pertinent history, physical exam, labs, studies, and medications. He has severe stenosis of the lumbar spine with a severe atrophy of the left lower extremity, particularly the left calf. He also has pretty significant of neurogenic claudication of the lumbar spine. He is in need of at a minimum of a lumbar decompression from L2-5. He has no instability on flexion and extension and minimum mechanical back pain. I believe that we can avoid a fusion. RIsk and benefits were discussed with him. Procedure: L2-5 lumbar laminectomy      The risks and benefits were discussed at length with the patient and the patient has elected to proceed. Indications for surgery include failed conservative treatment. Alternative treatments, risks and the perioperative course were discussed with the patient. All questions were answered. The risks and benefits of the procedure were explained. Benefits include definitive diagnosis, relief of pain, elimination of deformity and improved function. Risks of surgery including bleeding, infection, weakness, numbness, CSF leak, failure to improve symptoms, exacerbation of medical co-morbidities and even death were discussed with the patient. 1. Chronic bilateral low back pain without sciatica  -     XR SPINE LUMB MIN 4 V; Future      No follow-ups on file. SUBJECTIVE/OBJECTIVE:  Rocio Bowen (: 1955) is a 79 y.o. male. No flowsheet data found. He comes in today for evaluation for chronic back pain and neurogenic claudication. He was last seen by us approximately 10 years ago for similar type symptoms.   More recently has been developing increasing buttock pain and back pain with any prolonged standing or walking. His walking tolerance is decreased. More significantly he has developed significant atrophy in the left lower extremity particular the left calf musculature significant weakness with pushing off when walking. He is still having some giveaway out of the plantar flexion of his left leg while walking. He denies any bowel bladder difficulties. He is tried physical therapy without relief. He has an MRI for follow-up. Imaging:    XR Results (most recent):  Results from Appointment encounter on 11/04/22    XR SPINE LUMB MIN 4 V    Narrative  4 views of the lumbar spine reviewed today. Multilevel lumbar spondylosis is noted from L3-L5. There are significant at L2-3 and L4-5. No obvious gross instability with flexion-extension views. No fractures or lytic lesions. MRI Results (most recent): MRI Results (most recent):  Results from [place] encounter on 11/02/22    MRI LUMB SPINE WO CONT    Narrative  INDICATION:  Muscle wasting, atrophy    EXAMINATION:  MRI LUMBAR SPINE without CONTRAST    COMPARISON: 3/28/2011    TECHNIQUE: MR imaging of the lumbar spine was performed with sagittal T1, T2,  STIR;  axial T1, T2.  NO CONTRAST ADMINISTERED    FINDINGS:    Mild retrolisthesis of L2 on L3. Grade 1 anterolisthesis of L4 on L5. Severe  multilevel degenerative disc disease most pronounced at L2-3. Moderate edema and  endplate signal abnormalities at L2-3 favor reactive degenerative changes. No  significant fluid signal within the disc space. No evidence for acute fracture. No abnormality of the distal spinal cord. T12-L1: No central or neural foraminal stenosis. Mild to moderate facet  arthropathy    L1/2: Tiny disc bulge causing no central or neural foraminal stenosis. Mild  facet arthropathy    L2/3: Disc osteophyte complex with severe central stenosis.  Bilateral  subarticular stenosis with severe bilateral neural foraminal stenosis    L3/4: Disc osteophyte complex with moderate to severe central stenosis. Left  subarticular stenosis with moderate severe left and mild to moderate right  neural foraminal stenosis    L4/5: Disc osteophyte complex, facet arthropathy, and ligamentum flavum  hypertrophy causing severe central stenosis. Bilateral subarticular stenosis  with moderate to severe bilateral neural foraminal stenosis    L5/S1: Disc osteophyte complex, facet arthropathy, and ligamentum flavum  hypertrophy causing mild central stenosis. Bilateral subarticular stenosis with  severe bilateral neural foraminal stenosis    Small T2 hyperintense left renal lesion is likely a cyst    Impression  Progressive severe degenerative changes at L2-3 and L3-4. Persistent severe  degenerative changes at L4-5. Moderate edema and endplate signal abnormalities  at L2-3 favor reactive degenerative changes. Allergies   Allergen Reactions    Ciprofloxacin Myalgia    Codeine Other (comments)     Since allergy to hydrocodone shouldn't take codeine    Hydrocodone Shortness of Breath       Current Outpatient Medications   Medication Sig    cholecalciferol, vitamin D3, 50 mcg (2,000 unit) tab Take  by mouth.    cinnamon bark 500 mg cap Take  by mouth.    garlic 0058 mg tab Take  by mouth. zinc 50 mg tab tablet Take  by mouth daily. selenium 200 mcg cap Take  by mouth. glucosamine/chondr becerril A sod (glucosamine-chondroitin) 750-600 mg tab Take  by mouth two (2) times a day. fish oil-omega-3 fatty acids 340-1,000 mg capsule Take 1 Capsule by mouth daily. cyanocobalamin 1,000 mcg tablet Take 1,000 mcg by mouth daily. calcium carbonate (OS-ANTHONY) 500 mg calcium (1,250 mg) tablet Take  by mouth daily. docusate sodium (COLACE) 100 mg capsule Take 100 mg by mouth two (2) times a day.  [place] brand    olmesartan (BENICAR) 40 mg tablet TAKE 1 TABLET DAILY    metoprolol succinate (TOPROL-XL) 25 mg XL tablet TAKE 1 TABLET DAILY    Eliquis 5 mg tablet TAKE 1 TABLET TWICE A DAY    atorvastatin (LIPITOR) 10 mg tablet TAKE 1 TABLET DAILY    clobetasoL (TEMOVATE) 0.05 % ointment Apply 1 Bottle to affected area as needed. ALPRAZolam (XANAX) 0.5 mg tablet Take 1 tab by mouth once before flight for anxiety    dutasteride (AVODART) 0.5 mg capsule Take 0.5 mg by mouth daily. No current facility-administered medications for this visit. Past Medical History:   Diagnosis Date    Acoustic neuroma ([facility] [address] 2012    R  Marlon/ns    Colonic polyps 2006    Osorio/gi    Diverticulosis 2006    Encounter for screening colonoscopy 07/11/2018    Dr. Jack Sanchez - repeat in 3 years    HTN, goal below 150/90     Hypertrophy of prostate without urinary obstruction and other lower urinary tract symptoms (LUTS)     Dorado/uro    IBS (irritable bowel syndrome)     Lumbar stenosis     severe  Van/os    Other and unspecified hyperlipidemia 7/11    Paroxysmal atrial fibrillation (HCC)     ASA for anticoagulation. Beta blocker. Unstable angina ([facility] [address]     negative stress test 10/2016        Past Surgical History:   Procedure Laterality Date    BIOPSY PROSTATE  2006    COLONOSCOPY N/A 7/15/2021    . performed by Brandon [facility]ccabe MD at [facility] ENDOSCOPY    HX CHOLECYSTECTOMY  2004    HX HERNIA REPAIR  1996       Family History   Problem Relation Age of Onset    Diabetes Mother     Hypertension Mother     Cancer Mother         esophageal    Diabetes Father     Hypertension Father     Diabetes Brother     Hypertension Brother     Stroke Maternal Grandfather         Social History     Tobacco Use    Smoking status: Never    Smokeless tobacco: Never   Substance Use Topics    Alcohol use: Yes     Alcohol/week: 2.0 standard drinks     Types: 2 Glasses of wine per week     Comment: occasional    Drug use: No        Review of Systems       Vitals:  Ht 6' (1.829 m)   Wt 168 lb (76.2 kg)   BMI 22.78 kg/m²    Body mass index is 22.78 kg/m².     Ortho Exam       Alert and [place]  x 3    Normal gait and station; normal posture    No assistive devices today. Cervical spine:  Examination of the cervical spine demonstrates no tenderness on palpation, no pain, no swelling or edema, with normal lumbar range of motion. Thoracic spine: Examination of the thoracic spine demonstrates no tenderness on palpation, no pain, no swelling or edema. Normal sensation and range of motion. Lumbar spine:     Examination of the lumbar spine demonstrates on inspection: No abnormal cutaneous markings. No pelvic obliquity. Mild straightening of the lordosis. NO prior surgical incisions . On palpation: Moderate tenderness at the lower buttock region is noted. NO spasm is noted. Range of motion: Good range of motion is noted. Motor examination:  Right iliopsoas 5/5,  left iliopsoas 5/5, right quad 5/5, left quad 5/5, right anterior tibialis 5/5, left anterior tibialis 5/5, right EHL 5/5, left EHL 5/5, right gastrocnemius 5/5 , left gastrocnemius 3/5    Severe gastrocnemius  atrophy is noted. Sensory examination: Reveals NO deficits    Reflexes: Left knee 2/2, right knee 2/2, right ankle 2/2, left ankle 0/2    Functional testing: Straight leg test positive on the left; bowstring sign positive on the left    Babinsksi and Clonus negative bilaterally. An electronic signature was used to authenticate this note.   -- Mark Barragan MD

## 2022-11-04 NOTE — PATIENT INSTRUCTIONS
Lumbar Laminectomy: Before Your Surgery  What is a lumbar laminectomy? A lumbar laminectomy is surgery to ease pressure on the spinal cord and/or nerves of the lower spine. This is also called decompression surgery. The doctor makes a cut in the lower back. This cut is called an incision. Then the doctor takes out pieces of bone that are squeezing the spinal cord and nerves. The doctor may also take out other tissues. Many people have less pain soon after surgery. But your back may feel stiff and sore for a few months. Depending on the type of surgery you have, and your health, you may go home the same day. Or you may stay in the hospital for 1 or 2 days. You will likely return to work in 2 to 4 weeks. But if your job requires physical labor, it may take 4 to 8 weeks. How do you prepare for surgery? Surgery can be stressful. This information will help you understand what you can expect. And it will help you safely prepare for surgery. Preparing for surgery    Be sure you have someone to take you home. Anesthesia and pain medicine will make it unsafe for you to drive or get home on your own. Understand exactly what surgery is planned, along with the risks, benefits, and other options. If you take a medicine that prevents blood clots, your doctor may tell you to stop taking it before your surgery. Or your doctor may tell you to keep taking it. (These medicines include aspirin and other blood thinners.) Make sure that you understand exactly what your doctor wants you to do. Tell your doctor ALL the medicines, vitamins, supplements, and herbal remedies you take. Some may increase the risk of problems during your surgery. Your doctor will tell you if you should stop taking any of them before the surgery and how soon to do it. Make sure your doctor and the hospital have a copy of your advance directive. If you don't have one, you may want to prepare one.  It lets others know your health care wishes. It's a good thing to have before any type of surgery or procedure. What happens on the day of surgery? Follow the instructions exactly about when to stop eating and drinking. If you don't, your surgery may be canceled. If your doctor has told you to take your medicines on the day of surgery, take them with only a sip of water. Take a bath or shower before you come in for your surgery. Do not apply lotions, perfumes, deodorants, or nail polish. Do not shave the surgical site yourself. Take off all jewelry and piercings. And take out contact lenses, if you wear them. At the hospital or surgery center   Bring a picture ID. The area for surgery is often marked to make sure there are no errors. You will be kept comfortable and safe by your anesthesia provider. You will be asleep during the surgery. The surgery will take about 1 to 1½ hours. If a spinal fusion is done at the same time, surgery will last 2 to 4 hours. When should you call your doctor? You have questions or concerns. You don't understand how to prepare for your surgery. You become ill before the surgery (such as fever, flu, or a cold). You need to reschedule or have changed your mind about having the surgery. Where can you learn more? Go to http://www.gray.com/  Enter L364 in the search box to learn more about \"Lumbar Laminectomy: Before Your Surgery. \"  Current as of: March 9, 2022               Content Version: 13.4  © 2006-2022 [employer]wise[employer]. Care instructions adapted under license by [employer] (which disclaims liability or warranty for this information). If you have questions about a medical condition or this instruction, always ask your healthcare professional. [address] any warranty or liability for your use of this information.

## 2022-11-17 ENCOUNTER — OFFICE VISIT (OUTPATIENT)
Dept: CARDIOLOGY CLINIC | Age: 67
End: 2022-11-17
Payer: MEDICARE

## 2022-11-17 VITALS
BODY MASS INDEX: 22.75 KG/M2 | HEART RATE: 71 BPM | WEIGHT: 168 LBS | OXYGEN SATURATION: 99 % | SYSTOLIC BLOOD PRESSURE: 128 MMHG | HEIGHT: 72 IN | DIASTOLIC BLOOD PRESSURE: 70 MMHG | RESPIRATION RATE: 16 BRPM

## 2022-11-17 DIAGNOSIS — I48.0 PAROXYSMAL A-FIB (HCC): Primary | ICD-10-CM

## 2022-11-17 DIAGNOSIS — I10 BENIGN ESSENTIAL HTN: ICD-10-CM

## 2022-11-17 DIAGNOSIS — Z01.810 PREOP CARDIOVASCULAR EXAM: ICD-10-CM

## 2022-11-17 DIAGNOSIS — Z79.01 CHRONIC ANTICOAGULATION: ICD-10-CM

## 2022-11-17 DIAGNOSIS — E78.2 MIXED HYPERLIPIDEMIA: ICD-10-CM

## 2022-11-17 PROCEDURE — 3074F SYST BP LT 130 MM HG: CPT | Performed by: INTERNAL MEDICINE

## 2022-11-17 PROCEDURE — 93000 ELECTROCARDIOGRAM COMPLETE: CPT | Performed by: INTERNAL MEDICINE

## 2022-11-17 PROCEDURE — 3078F DIAST BP <80 MM HG: CPT | Performed by: INTERNAL MEDICINE

## 2022-11-17 PROCEDURE — 99213 OFFICE O/P EST LOW 20 MIN: CPT | Performed by: INTERNAL MEDICINE

## 2022-11-17 NOTE — LETTER
Patient:  Melanie Sánchez. YOB: 1955  Date of Visit: 11/17/2022    Dear Britany Garcia MD  [address] [place] [address]  Via Fax: [phone]     Susan Hernandez MD  [facility] [address]  [place] [address]  Via In Basket:    Mr. Lizzie Rios is a 78 yo M with hypertension, borderline hyperlipidemia seen in past for an abnormal EKG with Q waves in V1 and V2 with question of old anteroseptal infarct. Echo 2015 was normal with no infarct. Since his last visit, unfortunately he is having more issues with some atrophy in his left lower extremity and muscle twitching and he did see Dr. Bo Reno and there is severe stenosis of his lumbar spine involving L2-L3. It sounds like he will need a laminectomy. He had a recent EMG done and they have tentatively scheduled his surgery for 02/2022, but he thinks it may need to be moved up. He did take a muscle relaxant recently for ten days and at times his blood pressure was going low. On the other hand, he does not think that the muscle relaxant helped very much. We did talk about if he was going to continue this would have him cut his Benicar in half. If he is not going to continue this, would leave his medications the same. He denies any exertional chest pain, shortness of breath, palpitations, lightheadedness or dizziness. He is compensated on exam with clear lungs and no lower extremity edema. His EKG is normal sinus rhythm. He does have diffuse slight T-wave inversions, but this is unchanged for him. Assessment and Plan:   1. Preop cardiac evaluation. He is stable cardiac wise and low risk for cardiac complications for likely L2-L5 lumbar laminectomy with Dr. Bo Reno. Will send a clearance there. Eliquis can be held usually 48 hours, but given his surgery can be held as needed.   Based on the surgery, would defer to Dr. Bo Reno when to restart the blood thinner, but the default would be one day after surgery. 2. Paroxysmal atrial fibrillation, PVCs. Stable in sinus rhythm on beta blocker and he will continue this. We did talk in the past he can take an extra Toprol if he has a flare, but he has not had to do this. We did have him see Dr. Kyra Ortiz for consideration of ablation for the atrial fibrillation in the past, but this has been okay. Will have him follow back in six months. 3. Mixed hyperlipidemia. 4. Essential hypertension. Blood pressure is controlled. 5. Colon polyps. He gets colonoscopies every three years and this has been benign. If you have questions, please do not hesitate to call me.      Sincerely,      Charlie Das MD

## 2022-11-17 NOTE — PROGRESS NOTES
[place] [address]: Tomas Mcneill  [phone]    History of Present Illness:  Mr. Claudy Barney is a 78 yo M with hypertension, borderline hyperlipidemia seen in past for an abnormal EKG with Q waves in V1 and V2 with question of old anteroseptal infarct. Echo 2015 was normal with no infarct. Since his last visit, unfortunately he is having more issues with some atrophy in his left lower extremity and muscle twitching and he did see Dr. Karin Zelaya and there is severe stenosis of his lumbar spine involving L2-L3. It sounds like he will need a laminectomy. He had a recent EMG done and they have tentatively scheduled his surgery for 02/2022, but he thinks it may need to be moved up. He did take a muscle relaxant recently for ten days and at times his blood pressure was going low. On the other hand, he does not think that the muscle relaxant helped very much. We did talk about if he was going to continue this would have him cut his Benicar in half. If he is not going to continue this, would leave his medications the same. He denies any exertional chest pain, shortness of breath, palpitations, lightheadedness or dizziness. He is compensated on exam with clear lungs and no lower extremity edema. His EKG is normal sinus rhythm. He does have diffuse slight T-wave inversions, but this is unchanged for him. Assessment and Plan:   1. Preop cardiac evaluation. He is stable cardiac wise and low risk for cardiac complications for likely L2-L5 lumbar laminectomy with Dr. Karin Zelaya. Will send a clearance there. Eliquis can be held usually 48 hours, but given his surgery can be held as needed. Based on the surgery, would defer to Dr. Karin Zelaya when to restart the blood thinner, but the default would be one day after surgery. 2. Paroxysmal atrial fibrillation, PVCs. Stable in sinus rhythm on beta blocker and he will continue this.   We did talk in the past he can take an extra Toprol if he has a flare, but he has not had to do this. We did have him see Dr. Froyaln French for consideration of ablation for the atrial fibrillation in the past, but this has been okay. Will have him follow back in six months. 3. Mixed hyperlipidemia. 4. Essential hypertension. Blood pressure is controlled. 5. Colon polyps. He gets colonoscopies every three years and this has been benign. He  has a past medical history of Acoustic neuroma ([facility] [address] (2012), Colonic polyps (2006), Diverticulosis (2006), Encounter for screening colonoscopy (07/11/2018), HTN, goal below 150/90, Hypertrophy of prostate without urinary obstruction and other lower urinary tract symptoms (LUTS), IBS (irritable bowel syndrome), Lumbar stenosis, Other and unspecified hyperlipidemia (7/11), [facility] and Unstable angina ([facility] All other systems negative except as above. PE  Vitals:    11/17/22 1130   BP: 128/70   Pulse: 71   Resp: 16   SpO2: 99%   Weight: 168 lb (76.2 kg)   Height: 6' (1.829 m)      Body mass index is 22.78 kg/m².    General appearance - alert, well appearing, and in no distress  Mental status - affect appropriate to mood  Eyes - sclera anicteric, moist mucous membranes  Neck - supple, no significant adenopathy, no JVD  Chest - clear to auscultation, no wheezes, rales or rhonchi  Heart - normal rate, regular rhythm, normal S1, S2, no murmurs, rubs, clicks or gallops  Abdomen - soft, nontender, nondistended, no masses or organomegaly  Neurological - no focal deficit  Extremities - peripheral pulses normal, no pedal edema      Recent Labs:  Lab Results   Component Value Date/Time    Cholesterol, total 146 09/16/2020 07:54 AM    HDL Cholesterol 54 09/16/2020 07:54 AM    LDL, calculated 78 09/16/2020 07:54 AM    LDL, calculated 60 09/21/2019 09:15 AM    Triglyceride 71 09/16/2020 07:54 AM    CHOL/HDL Ratio 3.6 03/29/2010 10:01 AM     Lab Results   Component Value Date/Time    Creatinine (POC) 0.9 03/24/2012 09:20 AM Creatinine 0.90 11/04/2020 01:57 PM     Lab Results   Component Value Date/Time    BUN 17 11/04/2020 01:57 PM     Lab Results   Component Value Date/Time    Potassium 4.1 11/04/2020 01:57 PM     Lab Results   Component Value Date/Time    Hemoglobin A1c 5.6 09/16/2020 07:54 AM    Hemoglobin A1c, External 5.5 04/08/2022 12:00 AM     Lab Results   Component Value Date/Time    HGB 16.5 11/04/2020 01:57 PM     Lab Results   Component Value Date/Time    PLATELET 315 94/82/8859 01:57 PM       Reviewed:  Past Medical History:   Diagnosis Date    Acoustic neuroma ([facility] [address] 2012    R  Marlon/ns    Colonic polyps 2006    Osorio/gi    Diverticulosis 2006    Encounter for screening colonoscopy 07/11/2018    Dr. Fátima Beal - repeat in 3 years    HTN, goal below 150/90     Hypertrophy of prostate without urinary obstruction and other lower urinary tract symptoms (LUTS)     Dorado/uro    IBS (irritable bowel syndrome)     Lumbar stenosis     severe  Van/os    Other and unspecified hyperlipidemia 7/11    Paroxysmal atrial fibrillation (HCC)     ASA for anticoagulation. Beta blocker. Unstable angina (HCC)     negative stress test 10/2016     Social History     Tobacco Use   Smoking Status Never   Smokeless Tobacco Never     Social History     Substance and Sexual Activity   Alcohol Use Yes    Alcohol/week: 2.0 standard drinks    Types: 2 Glasses of wine per week    Comment: occasional     Allergies   Allergen Reactions    Ciprofloxacin Myalgia    Codeine Other (comments)     Since allergy to hydrocodone shouldn't take codeine    Hydrocodone Shortness of Breath       Current Outpatient Medications   Medication Sig    cholecalciferol, vitamin D3, 50 mcg (2,000 unit) tab Take  by mouth.    cinnamon bark 500 mg cap Take  by mouth.    garlic 4998 mg tab Take  by mouth. zinc 50 mg tab tablet Take  by mouth daily. selenium 200 mcg cap Take  by mouth.     glucosamine/chondr becerril A sod (glucosamine-chondroitin) 750-600 mg tab Take  by mouth two (2) times a day. fish oil-omega-3 fatty acids 340-1,000 mg capsule Take 1 Capsule by mouth daily. cyanocobalamin 1,000 mcg tablet Take 1,000 mcg by mouth daily. calcium carbonate (OS-ANTHONY) 500 mg calcium (1,250 mg) tablet Take  by mouth daily. docusate sodium (COLACE) 100 mg capsule Take 100 mg by mouth two (2) times a day. [place] brand    olmesartan (BENICAR) 40 mg tablet TAKE 1 TABLET DAILY    metoprolol succinate (TOPROL-XL) 25 mg XL tablet TAKE 1 TABLET DAILY    Eliquis 5 mg tablet TAKE 1 TABLET TWICE A DAY    atorvastatin (LIPITOR) 10 mg tablet TAKE 1 TABLET DAILY    clobetasoL (TEMOVATE) 0.05 % ointment Apply 1 Bottle to affected area as needed. ALPRAZolam (XANAX) 0.5 mg tablet Take 1 tab by mouth once before flight for anxiety    dutasteride (AVODART) 0.5 mg capsule Take 0.5 mg by mouth daily. No current facility-administered medications for this visit.        Yessy Mccauley MD  [facility] [place]  [address] [facility]  [address]

## 2022-11-21 DIAGNOSIS — M48.061 SPINAL STENOSIS OF LUMBAR REGION, UNSPECIFIED WHETHER NEUROGENIC CLAUDICATION PRESENT: Primary | ICD-10-CM

## 2022-11-21 DIAGNOSIS — G89.29 CHRONIC BILATERAL LOW BACK PAIN WITHOUT SCIATICA: Primary | ICD-10-CM

## 2022-11-21 DIAGNOSIS — M54.50 CHRONIC BILATERAL LOW BACK PAIN WITHOUT SCIATICA: Primary | ICD-10-CM

## 2022-11-21 NOTE — PROGRESS NOTES
Patient is scheduled for a L2-5 lumbar laminectomy. This procedure will be at [facility] on Wednesday 12/14/ 2022 By Jaqueline Hall. This is an outpatient  procedure. PAT IS REQUESTED/ .  This will be under General Anesthesia.      (M48.061) Spinal stenosis of lumbar region, unspecified whether neurogenic claudication present  (primary encounter diagnosis)        CPT CODE:  1980 [facility]        Special Equipment:  Michaela García

## 2022-12-06 ENCOUNTER — HOSPITAL ENCOUNTER (OUTPATIENT)
Dept: PREADMISSION TESTING | Age: 67
Discharge: HOME | End: 2022-12-06
Payer: MEDICARE

## 2022-12-06 VITALS
HEART RATE: 62 BPM | SYSTOLIC BLOOD PRESSURE: 129 MMHG | BODY MASS INDEX: 23.11 KG/M2 | HEIGHT: 72 IN | OXYGEN SATURATION: 97 % | TEMPERATURE: 98.2 F | WEIGHT: 170.6 LBS | DIASTOLIC BLOOD PRESSURE: 75 MMHG

## 2022-12-06 LAB
ABO + RH BLD: NORMAL
BLOOD GROUP ANTIBODIES SERPL: NORMAL
SPECIMEN EXP DATE BLD: NORMAL

## 2022-12-06 PROCEDURE — 36415 COLL VENOUS BLD VENIPUNCTURE: CPT

## 2022-12-06 PROCEDURE — 86900 BLOOD TYPING SEROLOGIC ABO: CPT

## 2022-12-06 RX ORDER — TAMSULOSIN HYDROCHLORIDE 0.4 MG/1
0.4 CAPSULE ORAL AS NEEDED
COMMUNITY

## 2022-12-06 NOTE — PERIOP NOTES
[address] INSTRUCTIONS  ORTHOPAEDIC    Surgery Date:   12/14/22    Your surgeon's office or [facility] staff will call you between 4 PM- 8 PM the day before surgery with your arrival time. If your surgery is on a Monday, you will receive a call the preceding Friday. Please report to [facility] Patient Access/Admitting on the 1st floor. Bring your insurance card, photo identification, and any copayment (if applicable). If you are going home the same day of your surgery, you must have a responsible adult to drive you home. You need to have a responsible adult to stay with you the first 24 hours after surgery and you should not drive a car for 24 hours following your surgery. Do NOT eat any solid foods after midnight the night before surgery including candy, mints or gum. You may drink clear liquids from midnight until 1 hour prior to arrival time. You may drink up to 12 ounces at one time every 4 hours. Do NOT drink alcohol or smoke 24 hours before surgery. STOP smoking for 14 days prior as it helps with breathing and healing after surgery. If your arrival time is 3pm or later, you may eat a light breakfast before 8am (toast, bagel-no butter, black coffee, plain tea, fruit juice-no pulp) Please note special instructions, if applicable, below for medications. If you are being admitted to the hospital,please leave personal belongings/luggage in your car until you have an assigned hospital room number. Please wear comfortable clothes. Wear your glasses instead of contacts. We ask that all money, jewelry and valuables be left at home. Wear no make up, particularly mascara, the day of surgery. All body piercings, rings, and jewelry need to be removed and left at home. Please remove any nail polish or artificial nails from your fingernails. Please wear your hair loose or down. Please no pony-tails, buns, or any metal hair accessories.  If you shower the morning of surgery, please do not apply any lotions or powders afterwards. You may wear deodorant. Do not shave any body area within 24 hours of your surgery. Please follow all instructions to avoid any potential surgical cancellation. Should your physical condition change, (i.e. fever, cold, flu, etc.) please notify your surgeon as soon as possible. It is important to be on time. If a situation occurs where you may be delayed, please call:  [phone] / [phone] on the day of surgery. The Preadmission Testing staff can be reached at [phone]. Special instructions: NONE    Current Outpatient Medications   Medication Sig    tamsulosin (FLOMAX) 0.4 mg capsule Take 0.4 mg by mouth as needed. cholecalciferol, vitamin D3, 50 mcg (2,000 unit) tab Take  by mouth.    cinnamon bark 500 mg cap Take  by mouth.    garlic 9416 mg tab Take  by mouth. zinc 50 mg tab tablet Take  by mouth daily. selenium 200 mcg cap Take  by mouth. glucosamine/chondr becerril A sod (glucosamine-chondroitin) 750-600 mg tab Take  by mouth two (2) times a day. fish oil-omega-3 fatty acids 340-1,000 mg capsule Take 1 Capsule by mouth daily. cyanocobalamin 1,000 mcg tablet Take 1,000 mcg by mouth daily. calcium carbonate (OS-ANTHONY) 500 mg calcium (1,250 mg) tablet Take  by mouth daily. docusate sodium (COLACE) 100 mg capsule Take 100 mg by mouth two (2) times a day. [place] brand    olmesartan (BENICAR) 40 mg tablet TAKE 1 TABLET DAILY    metoprolol succinate (TOPROL-XL) 25 mg XL tablet TAKE 1 TABLET DAILY    Eliquis 5 mg tablet TAKE 1 TABLET TWICE A DAY    atorvastatin (LIPITOR) 10 mg tablet TAKE 1 TABLET DAILY    clobetasoL (TEMOVATE) 0.05 % ointment Apply 1 Bottle to affected area as needed. ALPRAZolam (XANAX) 0.5 mg tablet Take 1 tab by mouth once before flight for anxiety    dutasteride (AVODART) 0.5 mg capsule Take 0.5 mg by mouth daily. No current facility-administered medications for this encounter.        YOU MUST ONLY TAKE THESE MEDICATIONS THE MORNING OF SURGERY WITH A SIP OF WATER: METOPROLOL, ATORVASTATIN  MEDICATIONS TO TAKE THE MORNING OF SURGERY ONLY IF NEEDED: NONE  HOLD these prescription medications BEFORE Surgery: NONE  Ask your surgeon/prescribing physician about when/if to STOP taking these medications: ELIQUIS  Stop any non-steroidal anti-inflammatory drugs (i.e. Ibuprofen, Naproxen, Advil, Aleve) 7 days before surgery. You may take Tylenol. STOP all vitamins and herbal supplements 1 week prior to  surgery. If you are currently taking Plavix, Coumadin, or any other blood-thinning/anticoagulant medication contact your prescribing physician for instructions. Preventing Infections Before and After - Your Surgery    IMPORTANT INSTRUCTIONS    You play an important role in your health and preparation for surgery. To reduce the germs on your skin you will need to shower with CHG soap (Chorhexidine gluconate 4%) two times before surgery. CHG soap (Hibiclens, Hex-A-Clens or store brand)  CHG soap will be provided at your Preadmission Testing (PAT) appointment. If you do not have a PAT appointment before surgery, you may arrange to  CHG soap from our office or purchase CHG soap at a pharmacy, grocery or department store. You need to purchase TWO 4 ounce bottles to use for your 2 showers. Steps to follow:  Elonda Lung your hair with your normal shampoo and your body with regular soap and rinse well to remove shampoo and soap from your skin. Wet a clean washcloth and turn off the shower. Put CHG soap on washcloth and apply to your entire body from the neck down. Do not use on your head, face or private parts(genitals). Do not use CHG soap on open sores, wounds or areas of skin irritation. Wash you body gently for 5 minutes. Do not wash your skin too hard. This soap does not create lather. Pay special attention to your underarms and from your belly button to your feet.   Turn the shower back on and rinse well to get CHG soap off your body. Pat your skin dry with a clean, dry towel. Do not apply lotions or moisturizer. Put on clean clothes and sleep on fresh bed sheets and do not allow pets to sleep with you. Shower with CHG soap 2 times before your surgery  The evening before your surgery  The morning of your surgery      Tips to help prevent infections after your surgery:  Protect your surgical wound from germs:  Hand washing is the most important thing you and your caregivers can do to prevent infections. Keep your bandage clean and dry! Do not touch your surgical wound. Use clean, freshly washed towels and washcloths every time you shower; do not share bath linens with others. Until your surgical wound is healed, wear clothing and sleep on bed linens each day that are clean and freshly washed. Do not allow pets to sleep in your bed with you or touch your surgical wound. Do not smoke - smoking delays wound healing. This may be a good time to stop smoking. If you have diabetes, it is important for you to manage your blood sugar levels properly before your surgery as well as after your surgery. Poorly managed blood sugar levels slow down wound healing and prevent you from healing completely. Prevention of Infection  Testing for Staphylococcus aureus on your skin before surgery    Staphylococcus aureus (staph) is a common bacteria that is found on the body. It normally does not cause infection on healthy skin. Before surgery, you will be tested to see if you have staph by swabbing the inside of your nose. When you have an incision with surgery, the goal is to protect that incision from infection. Removal of the staph bacteria before surgery can decrease the risk of a surgical site infection. If your nose swab is positive for staph you will be called. Your treatment will include 2 steps:  Prescription for Mupirocin ointment to be used in each nostril twice a day for 5 days.   Showering with Chlorhexidine (CHG) liquid soap for 5 days prior to surgery. How to use Mupirocin ointment in your nose   the prescription from your pharmacy. You will receive a large tube of ointment which will be big enough for all of your treatments. You will apply this ointment to each nostril 2 times a day for 5 days. Wash your hands with  gel or soap and water for 20 seconds before using ointment. Place a pea-sized amount of ointment on a cotton Q-tip. Apply ointment just inside of each nostril with the Q-tip. Do not push Q-tip or ointment deep inside you nose. Press your nostrils together and massage for a few seconds. Wash your hands with  gel or soap and water after you are finished. Do not get ointment near your eyes. If it gets into your eyes, rinse them with cool water. If you need to use nasal spray, clean the tip of the bottle with alcohol before use and do not use both at the same time. If you are scheduled for COVID testing during the 5 days, do NOT apply morning dose until after the COVID test has been performed. How to use Chlorhexidine (CHG) 4% liquid soap  Purchase an 8 ounce bottle of CHG liquid soap (Chlorhexidine 4%, Hibiclens, Hex-A-Clens or store brand) at a pharmacy or grocery store. Wash your hair with your normal shampoo and your body with regular soap and rinse well to remove shampoo and soap from your skin. Wet a clean washcloth and turn off the shower. Put CHG soap on washcloth and apply to your entire body from the neck down. Do not use on your head, face or private parts(genitals). Do not use CHG soap on open sores, wounds or areas of skin irritation. Wash your body gently for 5 minutes. Do not wash your skin too hard. This soap does not create lather. Pay special attention to your underarms and from your belly button to your feet. Turn the shower back on and rinse well to get CHG soap off your body. Pat your skin dry with a clean, dry towel. Do not apply lotions or moisturizer.   Put on clean clothes and sleep on fresh bed sheets the night before surgery. Do not allow pets to sleep with you. Eating and Drinking Before Surgery    You may eat a regular dinner at the usual time on the day before your surgery. Do NOT eat any solid foods after midnight unless your arrival time at the hospital is 3pm or later. You may drink clear liquids only from 12 midnight until 1 hours prior to your arrival time at the hospital on the day of your surgery. Do NOT drink alcohol. Clear liquids include:  Water  Fruit juices without pulp( i.e. apple juice)  Carbonated beverages  Black coffee (no cream/milk)  Tea (no cream/milk)  Gatorade  You may drink up to 12-16 ounces at one time every 4 hours between the hours of midnight and 1 hour before your arrival time at the hospital. Example- if your arrival time at the hospital is 6am, you may drink 12-16 ounces of clear liquids no later than 5am.  If your arrival time at the hospital is 3pm or later, you may eat a light breakfast before 8am.  A light breakfast includes: Toast or bagel (no butter)  Black coffee (no cream/milk)  Tea (no cream/milk)  Fruit juices without pulp ( i.e. apple juice)  Do NOT eat meat, eggs, vegetables or fruit  If you have any questions, please contact your surgeon's office. Patient Information Regarding COVID Restrictions    Day of Procedure    Please park in the parking deck or any designated visitor parking lot. Enter the facility through the Main Entrance of the hospital.  On the day of surgery, please provide the cell phone number of the person who will be waiting for you to the Patient Access representative at the time of registration. Masks are highly recommended in the hospital, but not required.   Once your procedure and the immediate recovery period is completed, a nurse in the recovery area will contact your designated visitor to inform them of your room number or to otherwise review other pertinent information regarding your care. Social distancing practices are strongly encouraged in waiting areas and the cafeteria. The patient was contacted in person. He verbalized understanding of all instructions does not  need reinforcement.

## 2022-12-07 LAB
BACTERIA SPEC CULT: NORMAL
BACTERIA SPEC CULT: NORMAL
SERVICE CMNT-IMP: NORMAL

## 2022-12-07 NOTE — H&P
Brandie Sanders. (: 1955) is a 79 y.o. male, patient, here for evaluation of the following chief complaint(s):  Back Pain (Low Back, Left Leg Weakness, Lumbar MRI 22)        ASSESSMENT/PLAN:     Below is the assessment and plan developed based on review of pertinent history, physical exam, labs, studies, and medications. He has severe stenosis of the lumbar spine with a severe atrophy of the left lower extremity, particularly the left calf. He also has pretty significant of neurogenic claudication of the lumbar spine. He is in need of at a minimum of a lumbar decompression from L2-5. He has no instability on flexion and extension and minimum mechanical back pain. I believe that we can avoid a fusion. RIsk and benefits were discussed with him. Procedure: L2-5 lumbar laminectomy        The risks and benefits were discussed at length with the patient and the patient has elected to proceed. Indications for surgery include failed conservative treatment. Alternative treatments, risks and the perioperative course were discussed with the patient. All questions were answered. The risks and benefits of the procedure were explained. Benefits include definitive diagnosis, relief of pain, elimination of deformity and improved function. Risks of surgery including bleeding, infection, weakness, numbness, CSF leak, failure to improve symptoms, exacerbation of medical co-morbidities and even death were discussed with the patient. 1. Chronic bilateral low back pain without sciatica  -     XR SPINE LUMB MIN 4 V; Future        No follow-ups on file. SUBJECTIVE/OBJECTIVE:  Brandie Sanders. (: 1955) is a 79 y.o. male. No flowsheet data found. He comes in today for evaluation for chronic back pain and neurogenic claudication. He was last seen by us approximately 10 years ago for similar type symptoms.   More recently has been developing increasing buttock pain and back pain with any prolonged standing or walking. His walking tolerance is decreased. More significantly he has developed significant atrophy in the left lower extremity particular the left calf musculature significant weakness with pushing off when walking. He is still having some giveaway out of the plantar flexion of his left leg while walking. He denies any bowel bladder difficulties. He is tried physical therapy without relief. He has an MRI for follow-up. Imaging:     XR Results (most recent):  Results from Appointment encounter on 11/04/22     XR SPINE LUMB MIN 4 V     Narrative  4 views of the lumbar spine reviewed today. Multilevel lumbar spondylosis is noted from L3-L5. There are significant at L2-3 and L4-5. No obvious gross instability with flexion-extension views. No fractures or lytic lesions. MRI Results (most recent): MRI Results (most recent):  Results from [place] encounter on 11/02/22     MRI LUMB SPINE WO CONT     Narrative  INDICATION:  Muscle wasting, atrophy     EXAMINATION:  MRI LUMBAR SPINE without CONTRAST     COMPARISON: 3/28/2011     TECHNIQUE: MR imaging of the lumbar spine was performed with sagittal T1, T2,  STIR;  axial T1, T2.  NO CONTRAST ADMINISTERED     FINDINGS:     Mild retrolisthesis of L2 on L3. Grade 1 anterolisthesis of L4 on L5. Severe  multilevel degenerative disc disease most pronounced at L2-3. Moderate edema and  endplate signal abnormalities at L2-3 favor reactive degenerative changes. No  significant fluid signal within the disc space. No evidence for acute fracture. No abnormality of the distal spinal cord. T12-L1: No central or neural foraminal stenosis. Mild to moderate facet  arthropathy     L1/2: Tiny disc bulge causing no central or neural foraminal stenosis. Mild  facet arthropathy     L2/3: Disc osteophyte complex with severe central stenosis.  Bilateral  subarticular stenosis with severe bilateral neural foraminal stenosis L3/4: Disc osteophyte complex with moderate to severe central stenosis. Left  subarticular stenosis with moderate severe left and mild to moderate right  neural foraminal stenosis     L4/5: Disc osteophyte complex, facet arthropathy, and ligamentum flavum  hypertrophy causing severe central stenosis. Bilateral subarticular stenosis  with moderate to severe bilateral neural foraminal stenosis     L5/S1: Disc osteophyte complex, facet arthropathy, and ligamentum flavum  hypertrophy causing mild central stenosis. Bilateral subarticular stenosis with  severe bilateral neural foraminal stenosis     Small T2 hyperintense left renal lesion is likely a cyst     Impression  Progressive severe degenerative changes at L2-3 and L3-4. Persistent severe  degenerative changes at L4-5. Moderate edema and endplate signal abnormalities  at L2-3 favor reactive degenerative changes. Allergies   Allergen Reactions    Ciprofloxacin Myalgia    Codeine Other (comments)       Since allergy to hydrocodone shouldn't take codeine    Hydrocodone Shortness of Breath              Current Outpatient Medications   Medication Sig    cholecalciferol, vitamin D3, 50 mcg (2,000 unit) tab Take  by mouth.    cinnamon bark 500 mg cap Take  by mouth.    garlic 3189 mg tab Take  by mouth. zinc 50 mg tab tablet Take  by mouth daily. selenium 200 mcg cap Take  by mouth. glucosamine/chondr becerril A sod (glucosamine-chondroitin) 750-600 mg tab Take  by mouth two (2) times a day. fish oil-omega-3 fatty acids 340-1,000 mg capsule Take 1 Capsule by mouth daily. cyanocobalamin 1,000 mcg tablet Take 1,000 mcg by mouth daily. calcium carbonate (OS-ANTHONY) 500 mg calcium (1,250 mg) tablet Take  by mouth daily. docusate sodium (COLACE) 100 mg capsule Take 100 mg by mouth two (2) times a day.  [place] brand    olmesartan (BENICAR) 40 mg tablet TAKE 1 TABLET DAILY    metoprolol succinate (TOPROL-XL) 25 mg XL tablet TAKE 1 TABLET DAILY Eliquis 5 mg tablet TAKE 1 TABLET TWICE A DAY    atorvastatin (LIPITOR) 10 mg tablet TAKE 1 TABLET DAILY    clobetasoL (TEMOVATE) 0.05 % ointment Apply 1 Bottle to affected area as needed. ALPRAZolam (XANAX) 0.5 mg tablet Take 1 tab by mouth once before flight for anxiety    dutasteride (AVODART) 0.5 mg capsule Take 0.5 mg by mouth daily. No current facility-administered medications for this visit. Past Medical History:   Diagnosis Date    Acoustic neuroma ([facility] [address] 2012     R  Marlon/ns    Colonic polyps 2006     Osorio/gi    Diverticulosis 2006    Encounter for screening colonoscopy 07/11/2018     Dr. Anya Garcia - repeat in 3 years    HTN, goal below 150/90      Hypertrophy of prostate without urinary obstruction and other lower urinary tract symptoms (LUTS)       Dorado/uro    IBS (irritable bowel syndrome)      Lumbar stenosis       severe  Van/os    Other and unspecified hyperlipidemia 7/11    Paroxysmal atrial fibrillation (HCC)       ASA for anticoagulation. Beta blocker. Unstable angina ([facility] [address]       negative stress test 10/2016               Past Surgical History:   Procedure Laterality Date    BIOPSY PROSTATE   2006    COLONOSCOPY N/A 7/15/2021     . performed by Estelita Riedel., MD at [facility] ENDOSCOPY    HX CHOLECYSTECTOMY   2004    HX HERNIA REPAIR   1996               Family History   Problem Relation Age of Onset    Diabetes Mother      Hypertension Mother      Cancer Mother           esophageal    Diabetes Father      Hypertension Father      Diabetes Brother      Hypertension Brother      Stroke Maternal Grandfather           Social History            Tobacco Use    Smoking status: Never    Smokeless tobacco: Never   Substance Use Topics    Alcohol use:  Yes       Alcohol/week: 2.0 standard drinks       Types: 2 Glasses of wine per week       Comment: occasional    Drug use: No         Review of Systems        Vitals:  Ht 6' (1.829 m)   Wt 168 lb (76.2 kg)   BMI 22.78 kg/m²    Body mass index is 22.78 kg/m². Ortho Exam         Alert and [place]  x 3     Normal gait and station; normal posture     No assistive devices today. Cervical spine:  Examination of the cervical spine demonstrates no tenderness on palpation, no pain, no swelling or edema, with normal lumbar range of motion. Thoracic spine: Examination of the thoracic spine demonstrates no tenderness on palpation, no pain, no swelling or edema. Normal sensation and range of motion. Lumbar spine:      Examination of the lumbar spine demonstrates on inspection: No abnormal cutaneous markings. No pelvic obliquity. Mild straightening of the lordosis. NO prior surgical incisions . On palpation: Moderate tenderness at the lower buttock region is noted. NO spasm is noted. Range of motion: Good range of motion is noted. Motor examination:  Right iliopsoas 5/5,  left iliopsoas 5/5, right quad 5/5, left quad 5/5, right anterior tibialis 5/5, left anterior tibialis 5/5, right EHL 5/5, left EHL 5/5, right gastrocnemius 5/5 , left gastrocnemius 3/5     Severe gastrocnemius  atrophy is noted. Sensory examination: Reveals NO deficits     Reflexes: Left knee 2/2, right knee 2/2, right ankle 2/2, left ankle 0/2     Functional testing: Straight leg test positive on the left; bowstring sign positive on the left     Babinsksi and Clonus negative bilaterally. An electronic signature was used to authenticate this note.   -- Larisa Christina MD          Electronically signed by Romeo Patricio MD at 11/04/22 7148

## 2022-12-07 NOTE — PERIOP NOTES
PAT Nurse Practitioner   Pre-Operative Chart Review/Assessment:-ORTHOPEDIC/NEUROSURGICAL SPINE                Patient Name:  Dalton Castro. Age:   79 y.o.    :  1955     Today's Date:  2022     Date of PAT:   22      Date of Surgery:    22      Procedure(s):  L2-L5 Laminectomy     Surgeon:   Dr. Liu Sine:       1)  PCP: Zhou Marte MD        2)  Cardiac Clearance: Pt followed by Dr. Tomas Mcneill. [address] [place] [address] 22. Clearance obtained. OV note and EKG in CC. 3)  Diabetic Treatment Consult: Not indicated. A1c-5.7       4)  Sleep Apnea evaluation:  Not indicated. IGNACIO Score 3.       5)  Treatment for MRSA/Staph Aureus: Neg       6)  Additional Concerns: HTN, PAF, BPH, hx of acoustic neuroma, [place]              Vital Signs:         Vitals:    22 0953   BP: 129/75   Pulse: 62   Temp: 98.2 °F (36.8 °C)   SpO2: 97%   Weight: 77.4 kg (170 lb 9.6 oz)   Height: 5' 11.5\" (1.816 m)          Body mass index is 23.46 kg/m².       ____________________________________________  PAST MEDICAL HISTORY  Past Medical History:   Diagnosis Date    Acoustic neuroma ([address] 2012    R  Marlon/ns    Arthritis     Colonic polyps     Osorio/gi    Diverticulosis     Encounter for screening colonoscopy 2018    Dr. Jack Sanchez - repeat in 3 years    HTN, goal below 150/90     Hypertrophy of prostate without urinary obstruction and other lower urinary tract symptoms (LUTS)     Dorado/uro    IBS (irritable bowel syndrome)     Lumbar stenosis     severe  Van/os    Other and unspecified hyperlipidemia 2011    Paroxysmal atrial fibrillation (HCC)     ASA for anticoagulation. Beta blocker. Unstable angina (HCC)     negative stress test 10/2016      ____________________________________________  PAST SURGICAL HISTORY  Past Surgical History:   Procedure Laterality Date    BIOPSY PROSTATE  2006    COLONOSCOPY N/A 07/15/2021    . performed by Iris Tadeo MD at [facility] ENDOSCOPY    HX CHOLECYSTECTOMY  2004    HX HERNIA REPAIR  1996    NEUROLOGICAL PROCEDURE UNLISTED  2012    GAMMA KNIFE RADIO SURGERY     ____________________________________________  HOME MEDICATIONS    Current Outpatient Medications   Medication Sig    tamsulosin (FLOMAX) 0.4 mg capsule Take 0.4 mg by mouth as needed. cholecalciferol, vitamin D3, 50 mcg (2,000 unit) tab Take  by mouth.    cinnamon bark 500 mg cap Take  by mouth.    garlic 1127 mg tab Take  by mouth. zinc 50 mg tab tablet Take  by mouth daily. selenium 200 mcg cap Take  by mouth. glucosamine/chondr becerril A sod (glucosamine-chondroitin) 750-600 mg tab Take  by mouth two (2) times a day. fish oil-omega-3 fatty acids 340-1,000 mg capsule Take 1 Capsule by mouth daily. cyanocobalamin 1,000 mcg tablet Take 1,000 mcg by mouth daily. calcium carbonate (OS-ANTHONY) 500 mg calcium (1,250 mg) tablet Take  by mouth daily. docusate sodium (COLACE) 100 mg capsule Take 100 mg by mouth two (2) times a day. [place] brand    olmesartan (BENICAR) 40 mg tablet TAKE 1 TABLET DAILY    metoprolol succinate (TOPROL-XL) 25 mg XL tablet TAKE 1 TABLET DAILY    Eliquis 5 mg tablet TAKE 1 TABLET TWICE A DAY    atorvastatin (LIPITOR) 10 mg tablet TAKE 1 TABLET DAILY    clobetasoL (TEMOVATE) 0.05 % ointment Apply 1 Bottle to affected area as needed. ALPRAZolam (XANAX) 0.5 mg tablet Take 1 tab by mouth once before flight for anxiety    dutasteride (AVODART) 0.5 mg capsule Take 0.5 mg by mouth daily.      No current facility-administered medications for this encounter.      ____________________________________________  ALLERGIES  Allergies   Allergen Reactions    Ciprofloxacin Myalgia    Codeine Other (comments)     Since allergy to hydrocodone shouldn't take codeine    Hydrocodone Shortness of Breath    Sulfa (Sulfonamide Antibiotics) Myalgia      ____________________________________________  SOCIAL HISTORY  Social History     Tobacco Use    Smoking status: Never    Smokeless tobacco: Never   Substance Use Topics    Alcohol use: Yes     Alcohol/week: 2.0 standard drinks     Types: 2 Glasses of wine per week     Comment: occasional      ____________________________________________   Internal Administration   First Dose      Second Dose         Last COVID Lab No results found for: Gregory Lazo, RCV2CT, CVD2M, COV2, XPLCVT, [address], [id number], COVV, Gupta Golder, COVPCR, [id number] [employer]               ____________________________________________    Labs:     CBC, BMP, Hgb A1c, PT/INR on chart. Hospital Outpatient Visit on 12/06/2022   Component Date Value Ref Range Status    Crossmatch Expiration 12/06/2022 12/17/2022,2359   Final    ABO/Rh(D) 12/06/2022 AB POSITIVE   Final    Antibody screen 12/06/2022 NEG   Final    Special Requests: 12/06/2022 NO SPECIAL REQUESTS    Final    Culture result: 12/06/2022 MRSA NOT PRESENT    Final    Culture result: 12/06/2022     Final                    Value:Screening of patient nares for MRSA is for surveillance purposes and, if positive, to facilitate isolation considerations in high risk settings. It is not intended for automatic decolonization interventions per se as regimens are not sufficiently effective to warrant routine use. Skin:     Denies open wounds, cuts, sores, rashes or other areas of concern in PAT assessment.         Julia Contreras NP  Available via [address] [place] [address]

## 2022-12-14 ENCOUNTER — ANESTHESIA EVENT (OUTPATIENT)
Dept: SURGERY | Age: 67
End: 2022-12-14
Payer: MEDICARE

## 2022-12-14 ENCOUNTER — ANESTHESIA (OUTPATIENT)
Dept: SURGERY | Age: 67
End: 2022-12-14
Payer: MEDICARE

## 2022-12-14 ENCOUNTER — APPOINTMENT (OUTPATIENT)
Dept: GENERAL RADIOLOGY | Age: 67
End: 2022-12-14
Attending: ORTHOPAEDIC SURGERY
Payer: MEDICARE

## 2022-12-14 ENCOUNTER — HOSPITAL ENCOUNTER (OUTPATIENT)
Age: 67
Discharge: HOME HEALTH | End: 2022-12-16
Attending: ORTHOPAEDIC SURGERY | Admitting: ORTHOPAEDIC SURGERY
Payer: MEDICARE

## 2022-12-14 DIAGNOSIS — M48.062 SPINAL STENOSIS OF LUMBAR REGION WITH NEUROGENIC CLAUDICATION: Primary | ICD-10-CM

## 2022-12-14 PROBLEM — M48.061 LUMBAR SPINAL STENOSIS: Status: ACTIVE | Noted: 2022-12-14

## 2022-12-14 PROCEDURE — 74011000258 HC RX REV CODE- 258: Performed by: NURSE ANESTHETIST, CERTIFIED REGISTERED

## 2022-12-14 PROCEDURE — 77030031139 HC SUT VCRL2 J&J -A: Performed by: ORTHOPAEDIC SURGERY

## 2022-12-14 PROCEDURE — 74011000258 HC RX REV CODE- 258: Performed by: PHYSICIAN ASSISTANT

## 2022-12-14 PROCEDURE — 74011000250 HC RX REV CODE- 250: Performed by: PHYSICIAN ASSISTANT

## 2022-12-14 PROCEDURE — 77030014650 HC SEAL MTRX FLOSEL BAXT -C: Performed by: ORTHOPAEDIC SURGERY

## 2022-12-14 PROCEDURE — 77030035236 HC SUT PDS STRATFX BARB J&J -B: Performed by: ORTHOPAEDIC SURGERY

## 2022-12-14 PROCEDURE — 77030026438 HC STYL ET INTUB CARD -A: Performed by: ANESTHESIOLOGY

## 2022-12-14 PROCEDURE — 74011250637 HC RX REV CODE- 250/637: Performed by: ANESTHESIOLOGY

## 2022-12-14 PROCEDURE — 63048 LAM FACETEC &FORAMOT EA ADDL: CPT | Performed by: ORTHOPAEDIC SURGERY

## 2022-12-14 PROCEDURE — 77030029099 HC BN WAX SSPC -A: Performed by: ORTHOPAEDIC SURGERY

## 2022-12-14 PROCEDURE — 74011250636 HC RX REV CODE- 250/636: Performed by: NURSE ANESTHETIST, CERTIFIED REGISTERED

## 2022-12-14 PROCEDURE — 51798 US URINE CAPACITY MEASURE: CPT

## 2022-12-14 PROCEDURE — 2709999900 HC NON-CHARGEABLE SUPPLY: Performed by: ORTHOPAEDIC SURGERY

## 2022-12-14 PROCEDURE — 77030002924 HC SUT GORTX WLGO -B: Performed by: ORTHOPAEDIC SURGERY

## 2022-12-14 PROCEDURE — 74011000250 HC RX REV CODE- 250: Performed by: ORTHOPAEDIC SURGERY

## 2022-12-14 PROCEDURE — 74011250637 HC RX REV CODE- 250/637: Performed by: PHYSICIAN ASSISTANT

## 2022-12-14 PROCEDURE — 63047 LAM FACETEC & FORAMOT LUMBAR: CPT | Performed by: ORTHOPAEDIC SURGERY

## 2022-12-14 PROCEDURE — 74011250636 HC RX REV CODE- 250/636: Performed by: ANESTHESIOLOGY

## 2022-12-14 PROCEDURE — 77030042409 HC STOCK ANTIEMB -A

## 2022-12-14 PROCEDURE — 76210000000 HC OR PH I REC 2 TO 2.5 HR: Performed by: ORTHOPAEDIC SURGERY

## 2022-12-14 PROCEDURE — 77030020269 HC MISC IMPL: Performed by: ORTHOPAEDIC SURGERY

## 2022-12-14 PROCEDURE — 74011250636 HC RX REV CODE- 250/636: Performed by: PHYSICIAN ASSISTANT

## 2022-12-14 PROCEDURE — 77030040922 HC BLNKT HYPOTHRM STRY -A

## 2022-12-14 PROCEDURE — 63048 LAM FACETEC &FORAMOT EA ADDL: CPT | Performed by: PHYSICIAN ASSISTANT

## 2022-12-14 PROCEDURE — 77030008684 HC TU ET CUF COVD -B: Performed by: ANESTHESIOLOGY

## 2022-12-14 PROCEDURE — 74011000250 HC RX REV CODE- 250: Performed by: NURSE ANESTHETIST, CERTIFIED REGISTERED

## 2022-12-14 PROCEDURE — 76060000037 HC ANESTHESIA 3 TO 3.5 HR: Performed by: ORTHOPAEDIC SURGERY

## 2022-12-14 PROCEDURE — 77030013079 HC BLNKT BAIR HGGR 3M -A: Performed by: ANESTHESIOLOGY

## 2022-12-14 PROCEDURE — 77030033138 HC SUT PGA STRATFX J&J -B: Performed by: ORTHOPAEDIC SURGERY

## 2022-12-14 PROCEDURE — 76010000172 HC OR TIME 2.5 TO 3 HR INTENSV-TIER 1: Performed by: ORTHOPAEDIC SURGERY

## 2022-12-14 PROCEDURE — 63047 LAM FACETEC & FORAMOT LUMBAR: CPT | Performed by: PHYSICIAN ASSISTANT

## 2022-12-14 PROCEDURE — 77030038600 HC TU BPLR IRR DISP STRY -B: Performed by: ORTHOPAEDIC SURGERY

## 2022-12-14 DEVICE — IMPLANT THIN HYDROPHILIC AMNIOTIC MEMEBRANE 4 X 4 CM: Type: IMPLANTABLE DEVICE | Site: BACK | Status: FUNCTIONAL

## 2022-12-14 RX ORDER — SODIUM CHLORIDE 0.9 % (FLUSH) 0.9 %
5-40 SYRINGE (ML) INJECTION AS NEEDED
Status: DISCONTINUED | OUTPATIENT
Start: 2022-12-14 | End: 2022-12-16 | Stop reason: HOSPADM

## 2022-12-14 RX ORDER — SODIUM CHLORIDE, SODIUM LACTATE, POTASSIUM CHLORIDE, CALCIUM CHLORIDE 600; 310; 30; 20 MG/100ML; MG/100ML; MG/100ML; MG/100ML
100 INJECTION, SOLUTION INTRAVENOUS CONTINUOUS
Status: DISCONTINUED | OUTPATIENT
Start: 2022-12-14 | End: 2022-12-14 | Stop reason: HOSPADM

## 2022-12-14 RX ORDER — KETAMINE HCL IN 0.9 % NACL 50 MG/5 ML
SYRINGE (ML) INTRAVENOUS AS NEEDED
Status: DISCONTINUED | OUTPATIENT
Start: 2022-12-14 | End: 2022-12-14 | Stop reason: HOSPADM

## 2022-12-14 RX ORDER — ONDANSETRON 2 MG/ML
INJECTION INTRAMUSCULAR; INTRAVENOUS AS NEEDED
Status: DISCONTINUED | OUTPATIENT
Start: 2022-12-14 | End: 2022-12-14 | Stop reason: HOSPADM

## 2022-12-14 RX ORDER — FENTANYL CITRATE 50 UG/ML
50 INJECTION, SOLUTION INTRAMUSCULAR; INTRAVENOUS AS NEEDED
Status: DISCONTINUED | OUTPATIENT
Start: 2022-12-14 | End: 2022-12-14 | Stop reason: HOSPADM

## 2022-12-14 RX ORDER — SUCCINYLCHOLINE CHLORIDE 20 MG/ML
INJECTION INTRAMUSCULAR; INTRAVENOUS AS NEEDED
Status: DISCONTINUED | OUTPATIENT
Start: 2022-12-14 | End: 2022-12-14 | Stop reason: HOSPADM

## 2022-12-14 RX ORDER — SODIUM CHLORIDE 0.9 % (FLUSH) 0.9 %
5-40 SYRINGE (ML) INJECTION EVERY 8 HOURS
Status: DISCONTINUED | OUTPATIENT
Start: 2022-12-14 | End: 2022-12-16 | Stop reason: HOSPADM

## 2022-12-14 RX ORDER — PHENYLEPHRINE HCL IN 0.9% NACL 0.4MG/10ML
SYRINGE (ML) INTRAVENOUS AS NEEDED
Status: DISCONTINUED | OUTPATIENT
Start: 2022-12-14 | End: 2022-12-14 | Stop reason: HOSPADM

## 2022-12-14 RX ORDER — HYDROMORPHONE HYDROCHLORIDE 1 MG/ML
0.5 INJECTION, SOLUTION INTRAMUSCULAR; INTRAVENOUS; SUBCUTANEOUS
Status: DISCONTINUED | OUTPATIENT
Start: 2022-12-14 | End: 2022-12-14 | Stop reason: HOSPADM

## 2022-12-14 RX ORDER — ATORVASTATIN CALCIUM 10 MG/1
10 TABLET, FILM COATED ORAL DAILY
Status: DISCONTINUED | OUTPATIENT
Start: 2022-12-15 | End: 2022-12-16 | Stop reason: HOSPADM

## 2022-12-14 RX ORDER — MORPHINE SULFATE 2 MG/ML
2 INJECTION, SOLUTION INTRAMUSCULAR; INTRAVENOUS
Status: DISCONTINUED | OUTPATIENT
Start: 2022-12-14 | End: 2022-12-14 | Stop reason: HOSPADM

## 2022-12-14 RX ORDER — ONDANSETRON 2 MG/ML
4 INJECTION INTRAMUSCULAR; INTRAVENOUS
Status: ACTIVE | OUTPATIENT
Start: 2022-12-14 | End: 2022-12-15

## 2022-12-14 RX ORDER — FENTANYL CITRATE 50 UG/ML
25 INJECTION, SOLUTION INTRAMUSCULAR; INTRAVENOUS
Status: DISCONTINUED | OUTPATIENT
Start: 2022-12-14 | End: 2022-12-14 | Stop reason: HOSPADM

## 2022-12-14 RX ORDER — AMOXICILLIN 250 MG
1 CAPSULE ORAL 2 TIMES DAILY
Status: DISCONTINUED | OUTPATIENT
Start: 2022-12-14 | End: 2022-12-16 | Stop reason: HOSPADM

## 2022-12-14 RX ORDER — TAMSULOSIN HYDROCHLORIDE 0.4 MG/1
0.4 CAPSULE ORAL DAILY
Status: DISCONTINUED | OUTPATIENT
Start: 2022-12-15 | End: 2022-12-16 | Stop reason: HOSPADM

## 2022-12-14 RX ORDER — MIDAZOLAM HYDROCHLORIDE 1 MG/ML
INJECTION, SOLUTION INTRAMUSCULAR; INTRAVENOUS AS NEEDED
Status: DISCONTINUED | OUTPATIENT
Start: 2022-12-14 | End: 2022-12-14 | Stop reason: HOSPADM

## 2022-12-14 RX ORDER — HYDROMORPHONE HYDROCHLORIDE 1 MG/ML
0.5 INJECTION, SOLUTION INTRAMUSCULAR; INTRAVENOUS; SUBCUTANEOUS
Status: ACTIVE | OUTPATIENT
Start: 2022-12-14 | End: 2022-12-15

## 2022-12-14 RX ORDER — SODIUM CHLORIDE 9 MG/ML
125 INJECTION, SOLUTION INTRAVENOUS CONTINUOUS
Status: DISPENSED | OUTPATIENT
Start: 2022-12-14 | End: 2022-12-15

## 2022-12-14 RX ORDER — NALOXONE HYDROCHLORIDE 0.4 MG/ML
0.4 INJECTION, SOLUTION INTRAMUSCULAR; INTRAVENOUS; SUBCUTANEOUS AS NEEDED
Status: DISCONTINUED | OUTPATIENT
Start: 2022-12-14 | End: 2022-12-16 | Stop reason: HOSPADM

## 2022-12-14 RX ORDER — MIDAZOLAM HYDROCHLORIDE 1 MG/ML
0.5 INJECTION, SOLUTION INTRAMUSCULAR; INTRAVENOUS
Status: DISCONTINUED | OUTPATIENT
Start: 2022-12-14 | End: 2022-12-14 | Stop reason: HOSPADM

## 2022-12-14 RX ORDER — ROCURONIUM BROMIDE 10 MG/ML
INJECTION, SOLUTION INTRAVENOUS AS NEEDED
Status: DISCONTINUED | OUTPATIENT
Start: 2022-12-14 | End: 2022-12-14 | Stop reason: HOSPADM

## 2022-12-14 RX ORDER — MIDAZOLAM HYDROCHLORIDE 1 MG/ML
1 INJECTION, SOLUTION INTRAMUSCULAR; INTRAVENOUS AS NEEDED
Status: DISCONTINUED | OUTPATIENT
Start: 2022-12-14 | End: 2022-12-14 | Stop reason: HOSPADM

## 2022-12-14 RX ORDER — EPHEDRINE SULFATE/0.9% NACL/PF 50 MG/5 ML
SYRINGE (ML) INTRAVENOUS AS NEEDED
Status: DISCONTINUED | OUTPATIENT
Start: 2022-12-14 | End: 2022-12-14 | Stop reason: HOSPADM

## 2022-12-14 RX ORDER — PROPOFOL 10 MG/ML
INJECTION, EMULSION INTRAVENOUS AS NEEDED
Status: DISCONTINUED | OUTPATIENT
Start: 2022-12-14 | End: 2022-12-14 | Stop reason: HOSPADM

## 2022-12-14 RX ORDER — LIDOCAINE HYDROCHLORIDE 10 MG/ML
0.1 INJECTION, SOLUTION EPIDURAL; INFILTRATION; INTRACAUDAL; PERINEURAL AS NEEDED
Status: DISCONTINUED | OUTPATIENT
Start: 2022-12-14 | End: 2022-12-14 | Stop reason: HOSPADM

## 2022-12-14 RX ORDER — FACIAL-BODY WIPES
10 EACH TOPICAL DAILY PRN
Status: DISCONTINUED | OUTPATIENT
Start: 2022-12-16 | End: 2022-12-16 | Stop reason: HOSPADM

## 2022-12-14 RX ORDER — GLYCOPYRROLATE 0.2 MG/ML
INJECTION INTRAMUSCULAR; INTRAVENOUS AS NEEDED
Status: DISCONTINUED | OUTPATIENT
Start: 2022-12-14 | End: 2022-12-14 | Stop reason: HOSPADM

## 2022-12-14 RX ORDER — POLYETHYLENE GLYCOL 3350 17 G/17G
17 POWDER, FOR SOLUTION ORAL DAILY
Status: DISCONTINUED | OUTPATIENT
Start: 2022-12-15 | End: 2022-12-16 | Stop reason: HOSPADM

## 2022-12-14 RX ORDER — SODIUM CHLORIDE 9 MG/ML
25 INJECTION, SOLUTION INTRAVENOUS CONTINUOUS
Status: DISCONTINUED | OUTPATIENT
Start: 2022-12-14 | End: 2022-12-14 | Stop reason: HOSPADM

## 2022-12-14 RX ORDER — SODIUM CHLORIDE 0.9 % (FLUSH) 0.9 %
5-40 SYRINGE (ML) INJECTION EVERY 8 HOURS
Status: DISCONTINUED | OUTPATIENT
Start: 2022-12-14 | End: 2022-12-14 | Stop reason: HOSPADM

## 2022-12-14 RX ORDER — DUTASTERIDE 0.5 MG/1
0.5 CAPSULE, LIQUID FILLED ORAL DAILY
Status: DISCONTINUED | OUTPATIENT
Start: 2022-12-15 | End: 2022-12-16 | Stop reason: HOSPADM

## 2022-12-14 RX ORDER — DEXMEDETOMIDINE HYDROCHLORIDE 100 UG/ML
INJECTION, SOLUTION INTRAVENOUS AS NEEDED
Status: DISCONTINUED | OUTPATIENT
Start: 2022-12-14 | End: 2022-12-14 | Stop reason: HOSPADM

## 2022-12-14 RX ORDER — OXYCODONE HYDROCHLORIDE 5 MG/1
5 TABLET ORAL
Status: DISCONTINUED | OUTPATIENT
Start: 2022-12-14 | End: 2022-12-16 | Stop reason: HOSPADM

## 2022-12-14 RX ORDER — ACETAMINOPHEN 325 MG/1
650 TABLET ORAL ONCE
Status: COMPLETED | OUTPATIENT
Start: 2022-12-14 | End: 2022-12-14

## 2022-12-14 RX ORDER — DEXAMETHASONE SODIUM PHOSPHATE 4 MG/ML
INJECTION, SOLUTION INTRA-ARTICULAR; INTRALESIONAL; INTRAMUSCULAR; INTRAVENOUS; SOFT TISSUE AS NEEDED
Status: DISCONTINUED | OUTPATIENT
Start: 2022-12-14 | End: 2022-12-14 | Stop reason: HOSPADM

## 2022-12-14 RX ORDER — LIDOCAINE HYDROCHLORIDE 20 MG/ML
INJECTION, SOLUTION EPIDURAL; INFILTRATION; INTRACAUDAL; PERINEURAL AS NEEDED
Status: DISCONTINUED | OUTPATIENT
Start: 2022-12-14 | End: 2022-12-14 | Stop reason: HOSPADM

## 2022-12-14 RX ORDER — SODIUM CHLORIDE 0.9 % (FLUSH) 0.9 %
5-40 SYRINGE (ML) INJECTION AS NEEDED
Status: DISCONTINUED | OUTPATIENT
Start: 2022-12-14 | End: 2022-12-14 | Stop reason: HOSPADM

## 2022-12-14 RX ORDER — ONDANSETRON 2 MG/ML
4 INJECTION INTRAMUSCULAR; INTRAVENOUS AS NEEDED
Status: DISCONTINUED | OUTPATIENT
Start: 2022-12-14 | End: 2022-12-14 | Stop reason: HOSPADM

## 2022-12-14 RX ORDER — CALCIUM CARBONATE 500(1250)
500 TABLET ORAL DAILY
Status: DISCONTINUED | OUTPATIENT
Start: 2022-12-15 | End: 2022-12-16 | Stop reason: HOSPADM

## 2022-12-14 RX ORDER — HYDROMORPHONE HYDROCHLORIDE 2 MG/ML
INJECTION, SOLUTION INTRAMUSCULAR; INTRAVENOUS; SUBCUTANEOUS AS NEEDED
Status: DISCONTINUED | OUTPATIENT
Start: 2022-12-14 | End: 2022-12-14 | Stop reason: HOSPADM

## 2022-12-14 RX ORDER — METOPROLOL SUCCINATE 25 MG/1
25 TABLET, EXTENDED RELEASE ORAL DAILY
Status: DISCONTINUED | OUTPATIENT
Start: 2022-12-15 | End: 2022-12-16 | Stop reason: HOSPADM

## 2022-12-14 RX ORDER — ROPIVACAINE HYDROCHLORIDE 5 MG/ML
30 INJECTION, SOLUTION EPIDURAL; INFILTRATION; PERINEURAL AS NEEDED
Status: DISCONTINUED | OUTPATIENT
Start: 2022-12-14 | End: 2022-12-14 | Stop reason: HOSPADM

## 2022-12-14 RX ORDER — OXYCODONE HYDROCHLORIDE 5 MG/1
10 TABLET ORAL
Status: DISCONTINUED | OUTPATIENT
Start: 2022-12-14 | End: 2022-12-16 | Stop reason: HOSPADM

## 2022-12-14 RX ORDER — MELATONIN
50 DAILY
Status: DISCONTINUED | OUTPATIENT
Start: 2022-12-15 | End: 2022-12-16 | Stop reason: HOSPADM

## 2022-12-14 RX ORDER — DIPHENHYDRAMINE HYDROCHLORIDE 50 MG/ML
12.5 INJECTION, SOLUTION INTRAMUSCULAR; INTRAVENOUS AS NEEDED
Status: DISCONTINUED | OUTPATIENT
Start: 2022-12-14 | End: 2022-12-14 | Stop reason: HOSPADM

## 2022-12-14 RX ORDER — FENTANYL CITRATE 50 UG/ML
INJECTION, SOLUTION INTRAMUSCULAR; INTRAVENOUS AS NEEDED
Status: DISCONTINUED | OUTPATIENT
Start: 2022-12-14 | End: 2022-12-14 | Stop reason: HOSPADM

## 2022-12-14 RX ORDER — LOSARTAN POTASSIUM 50 MG/1
100 TABLET ORAL DAILY
Status: DISCONTINUED | OUTPATIENT
Start: 2022-12-15 | End: 2022-12-16 | Stop reason: HOSPADM

## 2022-12-14 RX ORDER — NEOSTIGMINE METHYLSULFATE 1 MG/ML
INJECTION, SOLUTION INTRAVENOUS AS NEEDED
Status: DISCONTINUED | OUTPATIENT
Start: 2022-12-14 | End: 2022-12-14 | Stop reason: HOSPADM

## 2022-12-14 RX ORDER — DIPHENHYDRAMINE HYDROCHLORIDE 50 MG/ML
12.5 INJECTION, SOLUTION INTRAMUSCULAR; INTRAVENOUS
Status: ACTIVE | OUTPATIENT
Start: 2022-12-14 | End: 2022-12-15

## 2022-12-14 RX ORDER — ACETAMINOPHEN 325 MG/1
650 TABLET ORAL EVERY 6 HOURS
Status: DISCONTINUED | OUTPATIENT
Start: 2022-12-14 | End: 2022-12-16 | Stop reason: HOSPADM

## 2022-12-14 RX ADMIN — CEFAZOLIN 2 G: 1 INJECTION, POWDER, FOR SOLUTION INTRAMUSCULAR; INTRAVENOUS at 23:35

## 2022-12-14 RX ADMIN — HYDROMORPHONE HYDROCHLORIDE: 10 INJECTION INTRAMUSCULAR; INTRAVENOUS; SUBCUTANEOUS at 17:33

## 2022-12-14 RX ADMIN — Medication 10 MG: at 13:41

## 2022-12-14 RX ADMIN — FENTANYL CITRATE 50 MCG: 50 INJECTION INTRAMUSCULAR; INTRAVENOUS at 13:21

## 2022-12-14 RX ADMIN — ROCURONIUM BROMIDE 25 MG: 10 SOLUTION INTRAVENOUS at 13:28

## 2022-12-14 RX ADMIN — Medication 25 MG: at 13:21

## 2022-12-14 RX ADMIN — SODIUM CHLORIDE, PRESERVATIVE FREE 10 ML: 5 INJECTION INTRAVENOUS at 23:40

## 2022-12-14 RX ADMIN — ACETAMINOPHEN 650 MG: 325 TABLET ORAL at 19:23

## 2022-12-14 RX ADMIN — SENNOSIDES AND DOCUSATE SODIUM 1 TABLET: 50; 8.6 TABLET ORAL at 19:23

## 2022-12-14 RX ADMIN — SODIUM CHLORIDE, PRESERVATIVE FREE 10 ML: 5 INJECTION INTRAVENOUS at 19:27

## 2022-12-14 RX ADMIN — WATER 2 G: 1 INJECTION INTRAMUSCULAR; INTRAVENOUS; SUBCUTANEOUS at 13:32

## 2022-12-14 RX ADMIN — DEXMEDETOMIDINE HYDROCHLORIDE 10 MCG: 100 INJECTION, SOLUTION, CONCENTRATE INTRAVENOUS at 16:02

## 2022-12-14 RX ADMIN — ROCURONIUM BROMIDE 5 MG: 10 SOLUTION INTRAVENOUS at 13:21

## 2022-12-14 RX ADMIN — GLYCOPYRROLATE 0.4 MG: 0.2 INJECTION INTRAMUSCULAR; INTRAVENOUS at 15:48

## 2022-12-14 RX ADMIN — SODIUM CHLORIDE, POTASSIUM CHLORIDE, SODIUM LACTATE AND CALCIUM CHLORIDE: 600; 310; 30; 20 INJECTION, SOLUTION INTRAVENOUS at 14:29

## 2022-12-14 RX ADMIN — ONDANSETRON HYDROCHLORIDE 4 MG: 2 INJECTION, SOLUTION INTRAMUSCULAR; INTRAVENOUS at 13:50

## 2022-12-14 RX ADMIN — PROPOFOL 130 MG: 10 INJECTION, EMULSION INTRAVENOUS at 13:21

## 2022-12-14 RX ADMIN — SODIUM CHLORIDE 125 ML/HR: 9 INJECTION, SOLUTION INTRAVENOUS at 19:28

## 2022-12-14 RX ADMIN — HYDROMORPHONE HYDROCHLORIDE 0.5 MG: 2 INJECTION, SOLUTION INTRAMUSCULAR; INTRAVENOUS; SUBCUTANEOUS at 16:02

## 2022-12-14 RX ADMIN — NEOSTIGMINE METHYLSULFATE 3 MG: 1 INJECTION, SOLUTION INTRAVENOUS at 15:48

## 2022-12-14 RX ADMIN — PHENYLEPHRINE HYDROCHLORIDE 25 MCG/MIN: 10 INJECTION INTRAVENOUS at 14:15

## 2022-12-14 RX ADMIN — LIDOCAINE HYDROCHLORIDE 80 MG: 20 INJECTION, SOLUTION EPIDURAL; INFILTRATION; INTRACAUDAL; PERINEURAL at 13:21

## 2022-12-14 RX ADMIN — SUCCINYLCHOLINE CHLORIDE 140 MG: 20 INJECTION, SOLUTION INTRAMUSCULAR; INTRAVENOUS at 13:21

## 2022-12-14 RX ADMIN — Medication 80 MCG: at 13:45

## 2022-12-14 RX ADMIN — MIDAZOLAM 2 MG: 1 INJECTION INTRAMUSCULAR; INTRAVENOUS at 13:12

## 2022-12-14 RX ADMIN — SODIUM CHLORIDE, PRESERVATIVE FREE 10 ML: 5 INJECTION INTRAVENOUS at 23:35

## 2022-12-14 RX ADMIN — SODIUM CHLORIDE, POTASSIUM CHLORIDE, SODIUM LACTATE AND CALCIUM CHLORIDE 100 ML/HR: 600; 310; 30; 20 INJECTION, SOLUTION INTRAVENOUS at 12:36

## 2022-12-14 RX ADMIN — ONDANSETRON HYDROCHLORIDE 4 MG: 2 SOLUTION INTRAMUSCULAR; INTRAVENOUS at 18:05

## 2022-12-14 RX ADMIN — ACETAMINOPHEN 650 MG: 325 TABLET ORAL at 12:39

## 2022-12-14 RX ADMIN — ACETAMINOPHEN 650 MG: 325 TABLET ORAL at 23:40

## 2022-12-14 RX ADMIN — DEXMEDETOMIDINE HYDROCHLORIDE 10 MCG: 100 INJECTION, SOLUTION, CONCENTRATE INTRAVENOUS at 15:44

## 2022-12-14 RX ADMIN — DEXAMETHASONE SODIUM PHOSPHATE 4 MG: 4 INJECTION, SOLUTION INTRAMUSCULAR; INTRAVENOUS at 13:50

## 2022-12-14 RX ADMIN — HYDROMORPHONE HYDROCHLORIDE 0.5 MG: 2 INJECTION, SOLUTION INTRAMUSCULAR; INTRAVENOUS; SUBCUTANEOUS at 15:13

## 2022-12-14 RX ADMIN — PROPOFOL 50 MG: 10 INJECTION, EMULSION INTRAVENOUS at 13:22

## 2022-12-14 NOTE — ANESTHESIA PREPROCEDURE EVALUATION
Relevant Problems   No relevant active problems       Anesthetic History   No history of anesthetic complications            Review of Systems / Medical History  Patient summary reviewed, nursing notes reviewed and pertinent labs reviewed    Pulmonary  Within defined limits                 Neuro/Psych   Within defined limits           Cardiovascular  Within defined limits  Hypertension        Dysrhythmias   CAD    Exercise tolerance: >4 METS  Comments: Systolic function was normal by EF (biplane method of   disks). Ejection fraction was estimated to be 66 %. There were no regional   wall motion abnormalities.       GI/Hepatic/Renal  Within defined limits              Endo/Other  Within defined limits      Arthritis     Other Findings            Physical Exam    Airway  Mallampati: II  TM Distance: > 6 cm  Neck ROM: normal range of motion   Mouth opening: Normal     Cardiovascular  Regular rate and rhythm,  S1 and S2 normal,  no murmur, click, rub, or gallop             Dental  No notable dental hx       Pulmonary  Breath sounds clear to auscultation               Abdominal  GI exam deferred       Other Findings            Anesthetic Plan    ASA: 3  Anesthesia type: general          Induction: Intravenous  Anesthetic plan and risks discussed with: Patient

## 2022-12-14 NOTE — DISCHARGE INSTRUCTIONS
[facility] Dr. Lalito Mojica  [phone]    After [address]:  Discharge Instructions Lumbar Laminectomy Surgery     Patient Name: Monico Chavira. Date of procedure: 12/14/2022  Date of discharge: 12/16/2022    Procedure: Procedure(s):  L2-S1 LUMBAR LAMINECTOMY      Follow up appointments  -Follow up with Dr. Lalito Mojica in 2 weeks. Call [phone] to make an appointment as soon as you get home from the hospital.   [address] [place] [address] Hwy: _________________________   phone: ___________________  The agency will contact you to arrange dates/times for visits. Please call them if you do not hear from them within 24 hours after you are discharged  Physical therapy 3 times a week for 3 weeks  Nursing-initial assessment and as needed    When to call your Orthopaedic Surgeon:  -Signs of infection-if your incision is red; continues to have drainage; drainage has a foul odor or if you have a persistent fever over 101 degrees for 24 hours  -Nausea or vomiting, severe headache  -Loss of bowel or bladder function, inability to urinate  -Changes in sensation in your arms or legs (numbness, tingling, loss of color)  -Increased weakness-greater than before your surgery  -Severe pain or pain not relieved by medications  -Signs of a blood clot in your leg-calf pain, tenderness, redness, swelling of lower leg    When to call your Primary Care Physician:  -Concerns about medical conditions such as diabetes, high blood pressure, asthma, congestive heart failure  -Call if blood sugars are elevated, persistent headache or dizziness, coughing or congestion, constipation or diarrhea, burning with urination, abnormal heart rate    When to call 911 and go to the nearest emergency room:  -Acute onset of chest pain, shortness of breath, difficulty breathing    Activity  - You are going home a well person, be as active as possible. Your only exercise should be walking.   Start with short frequent walks and increase your walking distance each day.  -Limit the amount of time you sit to 20-30 minute intervals. Sitting for prolonged periods of time will be uncomfortable for you following surgery.  -Do NOT lift anything over 5 pounds  -Do NOT do any straining, twisting or bending  -When you are in bed, you may lay on your back or on either side. Do NOT lie on your stomach    Brace  -If you have a back brace, you should wear your brace at all times when you are out of bed. Do not wear the brace while in bed or showering.  -Remember to always wear a cotton t-shirt underneath your brace.  -Do not bend or twist when your brace is off    Diet  -Resume usual diet; drink plenty of fluids; eat foods high in fiber  -It is important to have regular bowel movements. Pain medications may cause constipation. You may want to take a stool softener (such as Senokot-S or Colace) to prevent constipation.  -If constipation occurs, take a laxative (such as Dulcolax tablets, Milk of Magnesia, or a suppository). Laxatives should only be used if the above preventable measures have failed and you still have not had a bowel movement after three days. Driving  -You may not drive or return to work until instructed by your physician. However, you may ride in the car for short periods of time. Incision Care  Your incision has been closed with absorbable sutures and steri-strips. This will assist with healing. The steri-strips to remain on your incision for 2 weeks. A dry dressing (ABD and tape) will be placed over it and should be changed daily, for at least the first several days after your surgery. If you have no incisional drainage, you may leave the incision open to air if you wish, still leaving the steri-strips in place. Please make sure to wash your hands prior to touching your dressing. You may take brief showers but do not run the water directly onto the wound.  After your shower, blot your incision dry with a soft towel and replace the dry dressing. Do not allow the tape to come in contact with the steri-strips. Do not rub or apply any lotions or ointments to your incision site. Do not soak or scrub your wound. The steri-strips will be removed during your two week follow-up appointment. If you experience drainage leaking from underneath the steri-strips or if it peels off before 2 weeks, please contact your orthopedic surgeons office. Showering  -You may shower in approximately 4 days after your surgery.    -Leave the dressing on during your shower. Do NOT allow the water to run directly onto your dressing. Once you get out of the shower, gently pat the dressing dry. -Reminder- your brace can be removed while showering. Remember to not bend or twist while your brace is off.    -Do not take a tub bath. Preventing blood clots  -You should take 1/2 the normal dose of Eliquis (2.5 mg twice daily) for 2 days after discharge from the hospital, then resume your normal dose of 5 mg twice daily on 12/19/2022.  -You have been given T.E.D. stockings to wear. Continue to wear these for 7 days after your discharge. Put them on in the morning and take them off at night.    -They are used to increase your circulation and prevent blood clots from forming in your legs  -T. E.D. stockings can be machine washed, temperature not to exceed 160° F (71°C) and machine dried for 15 to 20 minutes, temperature not to exceed 250° F (121°C).       Pain management  -Take pain medication as prescribed; decrease the amount you use as your pain lessens  -Do not wait until you are in extreme pain to take your medication.  -Avoid alcoholic beverages while taking pain medication    Pain Medication Safety  DO:  -Read the Medication Guide   -Take your medicine exactly as prescribed   -Store your medicine away from children and in a safe place   -Flush unused medicine down the toilet   -Call your healthcare provider for medical advice about side effects. You may report side effects to FDA at 4-273-FDA-9918.   -Please be aware that many medications contain Tylenol. We do not want you to over medicate so please read the information below as a guide. Do not take more than 4 Grams of Tylenol in a 24 hour period. (There are 1000 milligrams in one Gram)                                                                                                                                                                                                                           Percocet contains 325 mg of Tylenol per tablet (do not take more than 12 tablets in 24 hours)  Lortab contains 500 mg of Tylenol per tablet (do not take more than 8 tablets in 24 hours)  Norco contains 325 mg of Tylenol per tablet (do not take more than 12 tablets in 24 hours). DO NOT:  -Do not give your medicine to others   -Do not take medicine unless it was prescribed for you   -Do not stop taking your medicine without talking to your healthcare provider   -Do not break, chew, crush, dissolve, or inject your medicine. If you cannot  swallow your medicine whole, talk to your healthcare provider.  -Do not drink alcohol while taking this medicine  -Do not take anti-inflammatory medications or aspirin unless instructed by your physician.

## 2022-12-14 NOTE — PROGRESS NOTES
Losartan 100 mg was therapeutically interchanged for Olmesartan 40 mg per the P&T Committee approved Therapeutic Interchanges Policy.     Nikunj Godfrey MS, Pharmacist  12/14/2022 6:46 PM

## 2022-12-15 LAB
ANION GAP SERPL CALC-SCNC: 7 MMOL/L (ref 5–15)
BUN SERPL-MCNC: 13 MG/DL (ref 6–20)
BUN/CREAT SERPL: 15 (ref 12–20)
CALCIUM SERPL-MCNC: 8.7 MG/DL (ref 8.5–10.1)
CHLORIDE SERPL-SCNC: 106 MMOL/L (ref 97–108)
CO2 SERPL-SCNC: 23 MMOL/L (ref 21–32)
CREAT SERPL-MCNC: 0.85 MG/DL (ref 0.7–1.3)
GLUCOSE SERPL-MCNC: 121 MG/DL (ref 65–100)
HGB BLD-MCNC: 13.5 G/DL (ref 12.1–17)
POTASSIUM SERPL-SCNC: 4.2 MMOL/L (ref 3.5–5.1)
SODIUM SERPL-SCNC: 136 MMOL/L (ref 136–145)

## 2022-12-15 PROCEDURE — 74011000250 HC RX REV CODE- 250: Performed by: PHYSICIAN ASSISTANT

## 2022-12-15 PROCEDURE — 36415 COLL VENOUS BLD VENIPUNCTURE: CPT

## 2022-12-15 PROCEDURE — 85018 HEMOGLOBIN: CPT

## 2022-12-15 PROCEDURE — 97165 OT EVAL LOW COMPLEX 30 MIN: CPT

## 2022-12-15 PROCEDURE — 97535 SELF CARE MNGMENT TRAINING: CPT

## 2022-12-15 PROCEDURE — 51798 US URINE CAPACITY MEASURE: CPT

## 2022-12-15 PROCEDURE — 97530 THERAPEUTIC ACTIVITIES: CPT

## 2022-12-15 PROCEDURE — 74011250637 HC RX REV CODE- 250/637: Performed by: PHYSICIAN ASSISTANT

## 2022-12-15 PROCEDURE — 97161 PT EVAL LOW COMPLEX 20 MIN: CPT

## 2022-12-15 PROCEDURE — 80048 BASIC METABOLIC PNL TOTAL CA: CPT

## 2022-12-15 PROCEDURE — 74011250636 HC RX REV CODE- 250/636: Performed by: PHYSICIAN ASSISTANT

## 2022-12-15 PROCEDURE — 97116 GAIT TRAINING THERAPY: CPT

## 2022-12-15 RX ADMIN — CEFAZOLIN 2 G: 1 INJECTION, POWDER, FOR SOLUTION INTRAMUSCULAR; INTRAVENOUS at 06:29

## 2022-12-15 RX ADMIN — METOPROLOL SUCCINATE 25 MG: 25 TABLET, EXTENDED RELEASE ORAL at 09:18

## 2022-12-15 RX ADMIN — TAMSULOSIN HYDROCHLORIDE 0.4 MG: 0.4 CAPSULE ORAL at 09:19

## 2022-12-15 RX ADMIN — ACETAMINOPHEN 650 MG: 325 TABLET ORAL at 06:29

## 2022-12-15 RX ADMIN — Medication 500 MG: at 09:19

## 2022-12-15 RX ADMIN — OXYCODONE 10 MG: 5 TABLET ORAL at 13:02

## 2022-12-15 RX ADMIN — POLYETHYLENE GLYCOL 3350 17 G: 17 POWDER, FOR SOLUTION ORAL at 09:20

## 2022-12-15 RX ADMIN — OXYCODONE 10 MG: 5 TABLET ORAL at 17:08

## 2022-12-15 RX ADMIN — DUTASTERIDE 0.5 MG: 0.5 CAPSULE, LIQUID FILLED ORAL at 09:18

## 2022-12-15 RX ADMIN — SODIUM CHLORIDE, PRESERVATIVE FREE 10 ML: 5 INJECTION INTRAVENOUS at 06:29

## 2022-12-15 RX ADMIN — SODIUM CHLORIDE 125 ML/HR: 9 INJECTION, SOLUTION INTRAVENOUS at 02:25

## 2022-12-15 RX ADMIN — ATORVASTATIN CALCIUM 10 MG: 10 TABLET, FILM COATED ORAL at 09:18

## 2022-12-15 RX ADMIN — SODIUM CHLORIDE, PRESERVATIVE FREE 10 ML: 5 INJECTION INTRAVENOUS at 17:09

## 2022-12-15 RX ADMIN — Medication 2000 UNITS: at 09:19

## 2022-12-15 RX ADMIN — OXYCODONE 10 MG: 5 TABLET ORAL at 09:27

## 2022-12-15 RX ADMIN — SENNOSIDES AND DOCUSATE SODIUM 1 TABLET: 50; 8.6 TABLET ORAL at 09:19

## 2022-12-15 RX ADMIN — ACETAMINOPHEN 650 MG: 325 TABLET ORAL at 21:35

## 2022-12-15 RX ADMIN — LOSARTAN POTASSIUM 100 MG: 50 TABLET, FILM COATED ORAL at 09:18

## 2022-12-15 RX ADMIN — OXYCODONE 10 MG: 5 TABLET ORAL at 21:31

## 2022-12-15 RX ADMIN — ACETAMINOPHEN 650 MG: 325 TABLET ORAL at 17:08

## 2022-12-15 RX ADMIN — ACETAMINOPHEN 650 MG: 325 TABLET ORAL at 13:02

## 2022-12-15 RX ADMIN — SENNOSIDES AND DOCUSATE SODIUM 1 TABLET: 50; 8.6 TABLET ORAL at 17:08

## 2022-12-15 NOTE — PROGRESS NOTES
Problem: Mobility Impaired (Adult and Pediatric)  Goal: *Acute Goals and Plan of Care (Insert Text)  Description: FUNCTIONAL STATUS PRIOR TO ADMISSION: Patient was independent and active without use of DME. Notes L LE weakness with atrophy at calf, diminished sensation distal L LE. Pt lives alone, but brother will be staying with him to assist at initial d/c. Physical Therapy Goals  Initiated 12/15/2022  1. Patient will move from supine to sit and sit to supine  and roll side to side in bed with modified independence within 7 day(s). 2.  Patient will transfer from bed to chair and chair to bed with modified independence using the least restrictive device within 7 day(s). 3.  Patient will perform sit to stand with modified independence within 7 day(s). 4.  Patient will ambulate with modified independence for 300 feet with the least restrictive device within 7 day(s). 5.  Patient will ascend/descend 6 stairs with single handrail(s) with supervision/set-up within 7 day(s). Outcome: Not Met   PHYSICAL THERAPY EVALUATION  Patient: Shane Mosquera (78 y.o. male)  Date: 12/15/2022  Primary Diagnosis: Lumbar spinal stenosis [M48.061]  Procedure(s) (LRB):  L2-S1 LUMBAR LAMINECTOMY (N/A) 1 Day Post-Op   Precautions:   Spinal (brace)    ASSESSMENT  Based on the objective data described below, the patient presents with L LE weakness, impaired standing balance, gait dysfunction, increased fall risk s/p L2-S1 lami POD#1. Pt educated on spinal precautions; verbalized understanding, able to maintain with cues during mobility training. Pt donned LSO with assist. Mobilized with CGA overall for transfer sit to stand and amb on unit with RW; required intermittent cues for upright posture and walker mgmt. Pt tolerated up to chair at end of session. Educated on sitting precautions and left with all needs in reach. Will benefit from con't PT for mobility progression to include stair training.  Recommend HH PT at d/c.    Current Level of Function Impacting Discharge (mobility/balance): CGA with RW    Other factors to consider for discharge: brother able to provide support at initial d/c     Patient will benefit from skilled therapy intervention to address the above noted impairments. PLAN :  Recommendations and Planned Interventions: bed mobility training, transfer training, gait training, therapeutic exercises, patient and family training/education, and therapeutic activities      Frequency/Duration: Patient will be followed by physical therapy:  twice daily to address goals. Recommendation for discharge: (in order for the patient to meet his/her long term goals)  Physical therapy at least 2 days/week in the home     This discharge recommendation:  Has not yet been discussed the attending provider and/or case management    IF patient discharges home will need the following DME: to be determined (TBD); possibly RW         SUBJECTIVE:   Patient stated My brother is coming to stay with me; he had this surgery too.     OBJECTIVE DATA SUMMARY:   HISTORY:    Past Medical History:   Diagnosis Date    Acoustic neuroma ([facility] [address] 2012    R  Marlon/ns    Arthritis     Colonic polyps 2006    Osorio/gi    Diverticulosis 2006    Encounter for screening colonoscopy 07/11/2018    Dr. Radha Olsen - repeat in 3 years    HTN, goal below 150/90     Hypertrophy of prostate without urinary obstruction and other lower urinary tract symptoms (LUTS)     Dorado/uro    IBS (irritable bowel syndrome)     Lumbar stenosis     severe  Van/os    Other and unspecified hyperlipidemia 07/2011    Paroxysmal atrial fibrillation (HCC)     ASA for anticoagulation. Beta blocker. Unstable angina ([facility] [address] [facility]     negative stress test 10/2016     Past Surgical History:   Procedure Laterality Date    BIOPSY PROSTATE  2006    COLONOSCOPY N/A 07/15/2021    .  performed by Fatou Sinclair MD at [facility] ENDOSCOPY    HX CHOLECYSTECTOMY  2004    [facility] NEUROLOGICAL PROCEDURE UNLISTED  2012    GAMMA KNIFE RADIO SURGERY       Personal factors and/or comorbidities impacting plan of care: L LE weakness/ sensory impairment    Home Situation  Home Environment: (P) Private residence  # Steps to Enter: (P) 6  Rails to Enter: (P) Yes  Hand Rails : (P) Bilateral  One/Two Story Residence: (P) Two story, live on 1st floor  Living Alone: (P) Yes  Support Systems: (P) Other Family Member(s)  Patient Expects to be Discharged to[de-identified] Home  Current DME Used/Available at Home: (P) Walker, rolling, Cane, straight  Tub or Shower Type: (P) Shower    EXAMINATION/PRESENTATION/DECISION MAKING:   Critical Behavior:  Neurologic State: (P) Alert  Orientation Level: (P) Oriented X4  Cognition: (P) Appropriate decision making  Safety/Judgement: (P) Awareness of environment  Hearing:     Skin:  incision C/D/I  Range Of Motion:  AROM: Generally decreased, functional                       Strength:    Strength: Generally decreased, functional (decreased PF L ankle)                    Tone & Sensation:   Tone: Normal              Sensation: Impaired (light touch diminished L foot)               Coordination:  Coordination: Generally decreased, functional  Vision:      Functional Mobility:  Bed Mobility:  Rolling: Contact guard assistance (cues for log roll technique)  Supine to Sit: Contact guard assistance;Minimum assistance (R sidelying to sit)        Transfers:  Sit to Stand: Contact guard assistance  Stand to Sit: Contact guard assistance                       Balance:   Sitting: Intact  Standing: Impaired  Standing - Static: Good;Constant support  Standing - Dynamic : Fair;Good;Constant support (RW)  Ambulation/Gait Training:  Distance (ft): 300 Feet (ft)  Assistive Device: Brace/Splint;Gait belt;Walker, rolling  Ambulation - Level of Assistance: Contact guard assistance;Stand-by assistance        Gait Abnormalities: Decreased step clearance (slightly flexed posture)              Speed/Mary: Pace decreased (<100 feet/min)  Step Length: Left shortened;Right shortened                  Physical Therapy Evaluation Charge Determination   History Examination Presentation Decision-Making   MEDIUM  Complexity : 1-2 comorbidities / personal factors will impact the outcome/ POC  MEDIUM Complexity : 3 Standardized tests and measures addressing body structure, function, activity limitation and / or participation in recreation  LOW Complexity : Stable, uncomplicated  LOW Complexity : FOTO score of       Based on the above components, the patient evaluation is determined to be of the following complexity level: LOW     Pain Rating:  Pt denies c/o pain    Activity Tolerance:   Good    After treatment patient left in no apparent distress:   Sitting in chair and Call bell within reach    COMMUNICATION/EDUCATION:   The patients plan of care was discussed with: Registered nurse. Fall prevention education was provided and the patient/caregiver indicated understanding., Patient/family have participated as able in goal setting and plan of care. , and Patient/family agree to work toward stated goals and plan of care.     Thank you for this referral.  Emanuel Whitten, PT   Time Calculation: 40 mins

## 2022-12-15 NOTE — OP NOTES
[address]   OPERATIVE REPORT    Name:  Eddy Francis  MR#:  [id number]  :  1955  ACCOUNT #:  [de-identified]  DATE OF SERVICE:  2022    PREOPERATIVE DIAGNOSIS:  Lumbar stenosis L2-3, L3-4, L4-5, and L5-S1. POSTOPERATIVE DIAGNOSIS:  Lumbar stenosis L2-3, L3-4, L4-5, and L5-S1. PROCEDURES PERFORMED:  Lumbar laminectomy, L2-3, L3-4, L4-5, and L5-S1 with partial facetectomies with decompression bilaterally of the L2, L3, L4, L5, and S1 nerve roots. SURGEON:  Khadijah Odell MD    ASSISTANT:  Xiomara Chahal PA-C    ANESTHESIA:  General    COMPLICATIONS:  None. SPECIMENS REMOVED:  None    IMPLANTS:  None    ESTIMATED BLOOD LOSS:  Approximately 200 mL. INDICATIONS:  This is a 58-year-old gentleman with chronic back pain with neurogenic claudication, left-sided weakness, severe spinal stenosis noted from L2 down to S1. He failed conservative treatment with worsening symptoms. He understood the risks and benefits and elected to proceed. PROCEDURE:  The patient was identified, brought to the operative suite, and underwent general anesthesia without difficulty. Perioperative antibiotics were given to the patient. The patient was placed in a prone position. Back was prepped and draped sterilely. Time-out confirmed. We then made a midline incision. Dissection was carried down to expose the thoracodorsal fascia. We exposed the posterior spinal elements subperiosteally from the inferior half of L2 to the superior portion of the sacrum, confirmed on lateral fluoroscopy image that appropriate levels had been obtained. We then removed the spinous processes completely at L5, L4, L3, and inferior half of L2. Central laminectomy was done with #2 and #4 Kerrison undercutting the midline lamina. This allowed access to the central canal.  We removed the midline lamina of L5, L4, L3, inferior half of L2.   Then we started with the decompression of lateral recess and removing the ligament with partial facetectomies at L5-S1, L4-5, L3-4, and L2-3, careful to remove the remainder of the ligamentum. Severe spinal stenosis particularly at L3-4 and L4-5. Flexible dissector was used to remove the remainder of the ligament attachment as well as the lateral recess and the transversing nerve roots around the pedicles at each level, particularly on the left hand side. We decompressed from the L2 pedicle down to the S1 pedicle following each nerve root around the pedicle into the foramen. We undercut each level superior to the process for decompression of the foramen and the exiting nerve root. Afterwards, we could pass a Weinberg ball probe into the L2-3 foramen, L3-4 foramen, L4-5 foramen, and L5-S1 and visualized decompression of the L2, L3, L4, L5, and S1 nerve roots bilaterally. The wounds were irrigated with FloSeal.  Bulb with normal saline. FloSeal for hemostasis. Medium Hemovac placed. VersaShield for anti-adhesion. #1 Stratafix on the fascial layer, 2-0 Stratafix on the subcutaneous layer, and 3-0 Stratafix on skin. The patient returned to the PACU in stable condition. Kellie Mantilla PAC was present for the entirety of the procedure and vital for the performance of the procedure. TABATHA Hazel assisted with positioning, prepping, draping of the patient before the procedure and instrument manipulation/placement, spinal repositioning and navigation/robotics/fluoroscopy operation during the procedure as well as wound closure, dressing application and brace application after the procedure.  Please note that no intern, resident, or other hospital staff was available to assist during the surgery     MD GERRY Talley/[email or web address]  D:  12/14/2022 15:12  T:  12/14/2022 23:18  JOB #:  [id number]

## 2022-12-15 NOTE — PROGRESS NOTES
Problem: Falls - Risk of  Goal: *Absence of Falls  Description: Document Nikole Garner Fall Risk and appropriate interventions in the flowsheet.   Outcome: Progressing Towards Goal  Note: Fall Risk Interventions:            Medication Interventions: Patient to call before getting OOB

## 2022-12-15 NOTE — PROGRESS NOTES
Problem: Mobility Impaired (Adult and Pediatric)  Goal: *Acute Goals and Plan of Care (Insert Text)  Description: FUNCTIONAL STATUS PRIOR TO ADMISSION: Patient was independent and active without use of DME. Notes L LE weakness with atrophy at calf, diminished sensation distal L LE. Pt lives alone, but brother will be staying with him to assist at initial d/c. Physical Therapy Goals  Initiated 12/15/2022  1. Patient will move from supine to sit and sit to supine  and roll side to side in bed with modified independence within 7 day(s). 2.  Patient will transfer from bed to chair and chair to bed with modified independence using the least restrictive device within 7 day(s). 3.  Patient will perform sit to stand with modified independence within 7 day(s). 4.  Patient will ambulate with modified independence for 300 feet with the least restrictive device within 7 day(s). 5.  Patient will ascend/descend 6 stairs with single handrail(s) with supervision/set-up within 7 day(s). 12/15/2022 1419 by Irma Vora, PT  Outcome: Progressing Towards Goal  12/15/2022 1023 by Irma Vora, PT  Outcome: Not Met   PHYSICAL THERAPY TREATMENT  Patient: Georgi Chan. (78 y.o. male)  Date: 12/15/2022  Diagnosis: Lumbar spinal stenosis [M48.061] <principal problem not specified>  Procedure(s) (LRB):  L2-S1 LUMBAR LAMINECTOMY (N/A) 1 Day Post-Op  Precautions: Spinal (brace)  Chart, physical therapy assessment, plan of care and goals were reviewed. ASSESSMENT  Patient continues with skilled PT services and is progressing towards goals. Pt received in supine with supportive brother present. Able to recall 2/3 spinal precautions; requires cues to maintain with mobility. Bed mob training from flat bed to include log roll R and R sidelying<->sit; required cues for sequence and CGA overall. Pt transferred sit<-> stand from increased bed height as per baseline set up; required SBA and cues for hand placement.  Pt tolerated gait training on unit with RW SBA, intermittent cues for posture. Declined stair training this date due to general fatigue after poor sleep last night. Pt will benefit from con't PT for con't education on spinal precautions and mobility progression to include stair training. Anticipate pt will be clear for d/c home after a.m. session. Recommend follow up [place] [facility].    Current Level of Function Impacting Discharge (mobility/balance): CGA/ SBA with RW    Other factors to consider for discharge: pt lives alone; brother staying with pt at initial d/c         PLAN :  Patient continues to benefit from skilled intervention to address the above impairments. Continue treatment per established plan of care. to address goals. Recommendation for discharge: (in order for the patient to meet his/her long term goals)  Physical therapy at least 2 days/week in the home     This discharge recommendation:  Has not yet been discussed the attending provider and/or case management    IF patient discharges home will need the following DME: rolling walker       SUBJECTIVE:   Patient stated I only slept for about an hour last night; I think I'll try to nap.     OBJECTIVE DATA SUMMARY:   Critical Behavior:  Neurologic State: Alert  Orientation Level: Oriented X4  Cognition: Appropriate decision making  Safety/Judgement: Awareness of environment  Functional Mobility Training:  Bed Mobility:  Rolling: Stand-by assistance (cues for log roll technique)  Supine to Sit: Contact guard assistance;Stand-by assistance (R sidelying to sit from flat bed, cues for sequence)  Sit to Supine: Minimum assistance (sit to R sidelying, light assist at LEs)           Transfers:  Sit to Stand: Stand-by assistance (cues for hand placement/ push from stable surface)  Stand to Sit: Stand-by assistance        Bed to Chair: Contact guard assistance                    Balance:  Sitting: Intact  Standing: Impaired  Standing - Static: Good;Constant support  Standing - Dynamic : Fair;Good;Constant support  Ambulation/Gait Training:  Distance (ft): 300 Feet (ft)  Assistive Device: Brace/Splint;Gait belt;Walker, rolling  Ambulation - Level of Assistance: Stand-by assistance (intermittent cues for posture)        Gait Abnormalities: Decreased step clearance              Speed/Mary: Pace decreased (<100 feet/min)  Step Length: Left shortened;Right shortened        Pain Rating:  No c/o pain; pt received medication prior to session    Activity Tolerance:   Good    After treatment patient left in no apparent distress:   Patient positioned in right sidelying for pressure relief, Call bell within reach, and Side rails x 3    COMMUNICATION/COLLABORATION:   The patients plan of care was discussed with: Registered nurse.      Andra Valdivia, PT   Time Calculation: 29 mins

## 2022-12-15 NOTE — PROGRESS NOTES
Orthopedic Spine Progress Note  Post Op day: 1 Day Post-Op    December 15, 2022 8:09 AM   Admit Date: 2022  Procedure: Procedure(s):  L2-S1 LUMBAR LAMINECTOMY    Subjective:     Katheryn Almonte Jr. has no complaints. Pain is well controlled. Voiding without issue. + flatus   Tolerating diet. No N/V. Pain Control:   Pain Assessment  Pain Scale 1: Numeric (0 - 10)  Pain Intensity 1: 0  Pain Location 1: Back  Pain Orientation 1: Lower  Pain Description 1: Aching, Radiating, Tingling (low back pain, radiates to both legs, L>R, numbness/tingling in toes)  Pain Intervention(s) 1: Repositioned, Rest    Objective:          Physical Exam:  General:  Alert and oriented. No acute distress. Heart:  Respirations unlabored. Abdomen:   Extremities: Soft, non-tender. No evidence of cyanosis. Pulses palpable in both upper and lower extremities. Neurologic:  Musculoskeletal:  No new motor deficits. Neurovascular exam within normal limits. Sensation stable. Motor: unchanged C5-T1 and L2-S1. Jose's sign negative in bilateral lower extremities. Calves soft, nontender upon palpation and with passive twitch. Moves both upper and lower extremities. Incision: clean, dry, and intact. No significant erythema or swelling. No active drainage noted. Vital Signs:    Blood pressure 123/72, pulse 61, temperature 97.8 °F (36.6 °C), resp. rate 13, height 5' 11.5\" (1.816 m), weight 170 lb 10.2 oz (77.4 kg), SpO2 95 %.   Temp (24hrs), Av.6 °F (36.4 °C), Min:97 °F (36.1 °C), Max:98.1 °F (36.7 °C)      LAB:    Recent Labs     12/15/22  0246   HGB 13.5     Lab Results   Component Value Date/Time    Sodium 136 12/15/2022 02:46 AM    Potassium 4.2 12/15/2022 02:46 AM    Chloride 106 12/15/2022 02:46 AM    CO2 23 12/15/2022 02:46 AM    Glucose 121 (H) 12/15/2022 02:46 AM    BUN 13 12/15/2022 02:46 AM    Creatinine 0.85 12/15/2022 02:46 AM    Calcium 8.7 12/15/2022 02:46 AM       Intake/Output:12/15 0701 - 12/15 1900  In: -   Out: 50 [Drains:50]  12/13 1901 - 12/15 0700  In: 1200 [I.V.:1200]  Out: 1400 [Urine:1200; Drains:100]    PT/OT:   Gait:                    Assessment:   Patient is 1 Day Post-Op s/p Procedure(s):  L2-S1 LUMBAR LAMINECTOMY    Plan:     1. Continue PT/OT in [address]   2. Continue established methods of pain control-- transition to PO pain medications this morning   3. VTE Prophylaxes - TEDS &/or SCDs   4. Advance diet  5. Discharge pending clearance from PT; today v tomorrow   6.   Drain output 50 mL overnight; okay to DC today       Signed By: FINN Hernandez

## 2022-12-15 NOTE — PROGRESS NOTES
Medicare Outpatient Observation Notice (MOON)/ ) provided to patient/representative with verbal explanation of the notice. Time allotted for questions regarding the notice. Patient /representative provided a completed copy of the MOON notice. Copy placed on bedside chart.

## 2022-12-15 NOTE — ANESTHESIA POSTPROCEDURE EVALUATION
Procedure(s):  L2-S1 LUMBAR LAMINECTOMY. general    Anesthesia Post Evaluation        Patient location during evaluation: PACU  Note status: Adequate. Level of consciousness: responsive to verbal stimuli and sleepy but conscious  Pain management: satisfactory to patient  Airway patency: patent  Anesthetic complications: no  Cardiovascular status: acceptable  Respiratory status: acceptable  Hydration status: acceptable  Comments: +Post-Anesthesia Evaluation and Assessment    Patient: Magaly Escobar MRN: [id number]  SSN: [id number]   YOB: 1955  Age: 79 y.o. Sex: male          Cardiovascular Function/Vital Signs    /83 (BP 1 Location: Right upper arm, BP Patient Position: At rest;Lying)   Pulse 70   Temp 36.2 °C (97.2 °F)   Resp 15   Ht 5' 11.5\" (1.816 m)   Wt 77.4 kg (170 lb 10.2 oz)   SpO2 98%   BMI 23.47 kg/m²     Patient is status post Procedure(s):  L2-S1 LUMBAR LAMINECTOMY. Nausea/Vomiting: Controlled. Postoperative hydration reviewed and adequate. Pain:  Pain Scale 1: Numeric (0 - 10) (12/15/22 1427)  Pain Intensity 1: 5 (12/15/22 1427)   Managed. Neurological Status:   Neuro (WDL): Exceptions to WDL (12/14/22 1607)   At baseline. Mental Status and Level of Consciousness: Arousable. Pulmonary Status:   O2 Device: None (Room air) (12/15/22 1427)   Adequate oxygenation and airway patent. Complications related to anesthesia: None    Post-anesthesia assessment completed. No concerns. I have evaluated the patient and the patient is stable and ready to be discharged from PACU . Signed By: Chirag Sanders MD    12/15/2022        INITIAL Post-op Vital signs:   Vitals Value Taken Time   /69 12/14/22 1800   Temp 36.5 °C (97.7 °F) 12/14/22 1613   Pulse 67 12/14/22 1801   Resp 13 12/14/22 1801   SpO2 92 % 12/14/22 1801   Vitals shown include unvalidated device data.

## 2022-12-15 NOTE — PROGRESS NOTES
Problem: Self Care Deficits Care Plan (Adult)  Goal: *Acute Goals and Plan of Care (Insert Text)  Description: FUNCTIONAL STATUS PRIOR TO ADMISSION: Patient was independent and active without use of DME.    HOME SUPPORT: The patient lived alone with brother to provide assistance. Occupational Therapy Goals  Initiated 12/15/2022    1. Patient will perform lower body dressing with modified independence using AE PRN within 4 days. 2.  Patient will perform toileting with modified independence using most appropriate DME within 4 days. 3.  Patient will bathing at modified independence within 4 days. 4.  Patient will don/doff back brace at modified independence within 4 days. 5.  Patient will verbalize/demonstrate 3/3 back precautions during ADL tasks without cues within 4 days. Outcome: Progressing Towards Goal   OCCUPATIONAL THERAPY EVALUATION: Spine  Patient: Joyce Maldonado (78 y.o. male)  Date: 12/15/2022  Primary Diagnosis: Lumbar spinal stenosis [M48.061]  Procedure(s) (LRB):  L2-S1 LUMBAR LAMINECTOMY (N/A) 1 Day Post-Op   Precautions:   Spinal (brace) No bending, no lifting greater than 5 lbs, no twisting, log-roll technique, repositioning every 20-30 min except when sleeping, brace when OOB     ASSESSMENT :  Based on the objective data described below, patient presents with Minimum assistance upper body ADLs, Stand-by assistance lower body ADLs, and Contact guard assistance functional mobility. Pt very pleasant. Recommend 1 more OT visit to ensure safety for d/c. The following are barriers to ADL independence while in acute care:   - Cognitive and/or behavioral:  Intact  - Medical condition: Back Precautions    - Other:       Patient will benefit from skilled acute intervention to address the above impairments.   Patients rehabilitation potential is considered to be Excellent    Discharge recommendations: Home health (to increase independence and safety)  If above is not an option then recommend: None    Barriers to discharging home, in addition to above listed impairments: lives alone  family availability to assist.     Equipment recommendations for successful discharge (if) home: none     PLAN :  Recommendations and Planned Interventions: self care training, functional mobility training, patient education, and home safety training    Frequency/Duration: Patient will be followed by occupational therapy 5 times a week to address goals. SUBJECTIVE:   Patient stated My R calf has atrophied.     OBJECTIVE DATA SUMMARY:   HISTORY:   Past Medical History:   Diagnosis Date    Acoustic neuroma ([facility] [address] 2012    R  Marlon/ns    Arthritis     Colonic polyps 2006    Osorio/gi    Diverticulosis 2006    Encounter for screening colonoscopy 07/11/2018    Dr. Kecia Bishop - repeat in 3 years    HTN, goal below 150/90     Hypertrophy of prostate without urinary obstruction and other lower urinary tract symptoms (LUTS)     Dorado/uro    IBS (irritable bowel syndrome)     Lumbar stenosis     severe  Van/os    Other and unspecified hyperlipidemia 07/2011    Paroxysmal atrial fibrillation (HCC)     ASA for anticoagulation. Beta blocker. Unstable angina ([facility] [address]     negative stress test 10/2016     Past Surgical History:   Procedure Laterality Date    BIOPSY PROSTATE  2006    COLONOSCOPY N/A 07/15/2021    .  performed by Claire Arellano MD at [facility] ENDOSCOPY    HX CHOLECYSTECTOMY  2004    [facility]    NEUROLOGICAL PROCEDURE UNLISTED  2012    GAMMA KNIFE RADIO SURGERY       Prior Level of Function/Environment/Context: independent  Occupations in which the patient is/was successful, what are the barriers preventing that success:   Performance Patterns (routines, roles, habits, and rituals):   Personal Interests and/or values:   Expanded or extensive additional review of patient history:     Home Situation  Home Environment: Private residence  # Steps to Enter: 6  Rails to Enter: Yes  [employer] : Bilateral  One/Two Story Residence: Two story, live on 1st floor  Living Alone: Yes  Support Systems: Other Family Member(s)  Patient Expects to be Discharged to[de-identified] Home  Current DME Used/Available at Home: Ashwini Rachel, kerri, Cane, straight  Tub or Shower Type: Shower        EXAMINATION OF PERFORMANCE DEFICITS:  Cognitive/Behavioral Status:  Neurologic State: Alert  Orientation Level: Oriented X4  Cognition: Appropriate decision making        Safety/Judgement: Awareness of environment    Skin: intact    Edema: none noted    Hearing:       Vision/Perceptual:                                     Range of Motion:    AROM: Generally decreased, functional                         Strength:    Strength: Generally decreased, functional (decreased PF L ankle)                Coordination:  Coordination: Generally decreased, functional  Fine Motor Skills-Upper: Left Intact; Right Intact    Gross Motor Skills-Upper: Left Intact; Right Intact    Tone & Sensation:    Tone: Normal  Sensation: Impaired (light touch diminished L foot)                      Balance:  Sitting: Intact  Standing: Impaired  Standing - Static: Good;Constant support  Standing - Dynamic : Fair;Good;Constant support (RW)    Functional Mobility and Transfers for ADLs:  Bed Mobility:  Rolling: Contact guard assistance (cues for log roll technique)  Supine to Sit: Contact guard assistance;Minimum assistance (R sidelying to sit)    Transfers:  Sit to Stand: Contact guard assistance  Stand to Sit: Contact guard assistance  Bed to Chair: Contact guard assistance  Bathroom Mobility: Contact guard assistance    ADL Assessment:  Feeding: Independent    Oral Facial Hygiene/Grooming: Independent    Bathing: Contact guard assistance    Upper Body Dressing: Minimum assistance    Lower Body Dressing: Stand-by assistance    Toileting: Stand by assistance                Patient instructed and demonstrated 3/3 cervical spine precautions with verbal/written cues.      Patient instructed and indicated understanding the benefits of maintaining activity tolerance, functional mobility, and independence with self care tasks during acute stay  to ensure safe return home and to baseline. Encouraged patient to increase frequency and duration OOB, not sitting longer than 30 mins without marching/walking with staff, be out of bed for all meals, perform daily ADLs (as approved by RN/MD regarding bathing etc), and performing functional mobility to/from bathroom. Patient instruction and indicated understanding on body mechanics, ergonomics and gravitational force on the spine during different body positions to plan activities in prep for return home to complete basic ADLs, instrumental ADLs and back to work safely. Patient instructed and demonstrated while supine hip ER stretch and hold 10 seconds to increase ROM in prep for lower body ADLs daily. Bathing: Patient instructed and indicated understanding when bathing to not submerge wound in water, stand to shower or sponge bathe, cover wound with plastic and tape to ensure no water reaches bandage/wound without cues. Dressing brace: Patient instructed and demonstrated to don/doff velcro on brace using dominant side, keeping non-dominant side intact. Patient instructed and demonstrated in meantime of being able to stand with back against wall to don/doff brace, to don/doff seated using lap and bed/chair surface to support brace while manipulating. Dressing lower body: Patient instructed to don brace first and on the benefits to remain seated to don all clothing to increase independence with precautions and pain management. Patient instructed and demonstrated tailor sitting for lower body dressing.        Home safety: Patient instructed and indicated understanding on home modifications and safety [raise height of ADL objects (i.e. clothing, sink items, fridge items, items to mouth when grooming), appropriate height of chair surfaces, recliner safety, change of floor surfaces, clear pathways] to increase independence and fall prevention. Standing: Patient instructed and indicated understanding to walk up to sink/counter top/surfaces, step into walker, square off while using objects, slide objects along surfaces, to increase adherence to back precautions and fall prevention. Patient instructed to increase amount of time standing in order to increase independence and tolerance with ADLs. During prolonged standing, can open cabinet door or place foot on stool to decrease spinal pressure/increase pain. Pain:  Controlled pain    Activity Tolerance:   Good  Please refer to the flowsheet for vital signs taken during this treatment. After treatment patient left:   Up in chair  Call light within reach   COMMUNICATION/EDUCATION:   The patients plan of care was discussed with: Physical Therapist and Registered Nurse. Home safety education was provided and the patient/caregiver indicated understanding., Patient/family have participated as able in goal setting and plan of care. , and Patient/family agree to work toward stated goals and plan of care. This patients plan of care is appropriate for delegation to [facility].     Thank you for this referral.  Jay Morgan, YULIAR/L    Total Time: 36 min

## 2022-12-15 NOTE — PROGRESS NOTES
Transition of Care Plan  RUR- Observation  DISPOSITION: The disposition plan is home with family assistance; pending medical progression  Pending PT clearance   F/U with PCP/Specialist    Transport: Family     Care Management Interventions  PCP Verified by CM: Yes  Mode of Transport at Discharge:  Other (see comment)  Transition of Care Consult (CM Consult): Discharge Planning  MyChart Signup: Yes  Discharge Durable Medical Equipment: No  Physical Therapy Consult: Yes  Occupational Therapy Consult: Yes  Speech Therapy Consult: No  Support Systems: Other Family Member(s)  Confirm Follow Up Transport: Family  The Patient and/or Patient Representative was Provided with a Choice of Provider and Agrees with the Discharge Plan?: Yes  Freedom of Choice List was Provided with Basic Dialogue that Supports the Patient's Individualized Plan of Care/Goals, Treatment Preferences and Shares the Quality Data Associated with the Providers?: Yes  Discharge Location  Patient Expects to be Discharged to[de-identified] Home      CM: [address] [place],   [phone]

## 2022-12-16 VITALS
DIASTOLIC BLOOD PRESSURE: 72 MMHG | SYSTOLIC BLOOD PRESSURE: 144 MMHG | HEIGHT: 72 IN | HEART RATE: 81 BPM | OXYGEN SATURATION: 95 % | TEMPERATURE: 98.2 F | BODY MASS INDEX: 23.11 KG/M2 | RESPIRATION RATE: 17 BRPM | WEIGHT: 170.64 LBS

## 2022-12-16 LAB — HGB BLD-MCNC: 13.8 G/DL (ref 12.1–17)

## 2022-12-16 PROCEDURE — 97535 SELF CARE MNGMENT TRAINING: CPT

## 2022-12-16 PROCEDURE — 36415 COLL VENOUS BLD VENIPUNCTURE: CPT

## 2022-12-16 PROCEDURE — 74011000250 HC RX REV CODE- 250: Performed by: PHYSICIAN ASSISTANT

## 2022-12-16 PROCEDURE — 74011250637 HC RX REV CODE- 250/637: Performed by: PHYSICIAN ASSISTANT

## 2022-12-16 PROCEDURE — 85018 HEMOGLOBIN: CPT

## 2022-12-16 PROCEDURE — 97116 GAIT TRAINING THERAPY: CPT

## 2022-12-16 RX ORDER — OXYCODONE HYDROCHLORIDE 5 MG/1
5-10 TABLET ORAL
Qty: 50 TABLET | Refills: 0 | Status: SHIPPED | OUTPATIENT
Start: 2022-12-16 | End: 2022-12-23

## 2022-12-16 RX ORDER — POLYETHYLENE GLYCOL 3350 17 G/17G
17 POWDER, FOR SOLUTION ORAL
Qty: 30 PACKET | Refills: 0 | Status: SHIPPED | OUTPATIENT
Start: 2022-12-16

## 2022-12-16 RX ORDER — NALOXONE HYDROCHLORIDE 4 MG/.1ML
SPRAY NASAL
Qty: 1 EACH | Refills: 0 | Status: SHIPPED | OUTPATIENT
Start: 2022-12-16

## 2022-12-16 RX ADMIN — Medication 2000 UNITS: at 08:47

## 2022-12-16 RX ADMIN — DUTASTERIDE 0.5 MG: 0.5 CAPSULE, LIQUID FILLED ORAL at 08:48

## 2022-12-16 RX ADMIN — SENNOSIDES AND DOCUSATE SODIUM 1 TABLET: 50; 8.6 TABLET ORAL at 08:48

## 2022-12-16 RX ADMIN — OXYCODONE 10 MG: 5 TABLET ORAL at 05:01

## 2022-12-16 RX ADMIN — ACETAMINOPHEN 650 MG: 325 TABLET ORAL at 13:02

## 2022-12-16 RX ADMIN — ACETAMINOPHEN 650 MG: 325 TABLET ORAL at 05:01

## 2022-12-16 RX ADMIN — POLYETHYLENE GLYCOL 3350 17 G: 17 POWDER, FOR SOLUTION ORAL at 08:48

## 2022-12-16 RX ADMIN — OXYCODONE 5 MG: 5 TABLET ORAL at 13:02

## 2022-12-16 RX ADMIN — ATORVASTATIN CALCIUM 10 MG: 10 TABLET, FILM COATED ORAL at 08:48

## 2022-12-16 RX ADMIN — Medication 500 MG: at 08:48

## 2022-12-16 RX ADMIN — SODIUM CHLORIDE, PRESERVATIVE FREE 10 ML: 5 INJECTION INTRAVENOUS at 05:02

## 2022-12-16 RX ADMIN — TAMSULOSIN HYDROCHLORIDE 0.4 MG: 0.4 CAPSULE ORAL at 08:48

## 2022-12-16 RX ADMIN — METOPROLOL SUCCINATE 25 MG: 25 TABLET, EXTENDED RELEASE ORAL at 08:48

## 2022-12-16 RX ADMIN — LOSARTAN POTASSIUM 100 MG: 50 TABLET, FILM COATED ORAL at 08:47

## 2022-12-16 RX ADMIN — OXYCODONE 10 MG: 5 TABLET ORAL at 01:02

## 2022-12-16 RX ADMIN — OXYCODONE 10 MG: 5 TABLET ORAL at 08:51

## 2022-12-16 NOTE — PROGRESS NOTES
Problem: Falls - Risk of  Goal: *Absence of Falls  Description: Document Consuelo Smith Fall Risk and appropriate interventions in the flowsheet.   12/16/2022 1332 by Yoko Saxena RN  Outcome: Resolved/Met  Note: Fall Risk Interventions:            Medication Interventions: Patient to call before getting OOB                12/16/2022 1251 by Yoko Saxena RN  Outcome: Progressing Towards Goal  Note: Fall Risk Interventions:            Medication Interventions: Patient to call before getting OOB                   Problem: Patient Education: Go to Patient Education Activity  Goal: Patient/Family Education  12/16/2022 1332 by Yoko Saxena RN  Outcome: Resolved/Met  12/16/2022 1251 by Yoko Saxena RN  Outcome: Progressing Towards Goal     Problem: Patient Education: Go to Patient Education Activity  Goal: Patient/Family Education  Outcome: Resolved/Met     Problem: Patient Education: Go to Patient Education Activity  Goal: Patient/Family Education  Outcome: Resolved/Met     Problem: Simple Spine Procedure:  Day of Surgery  Goal: Off Pathway (Use only if patient is Off Pathway)  Outcome: Resolved/Met  Goal: Activity/Safety  Outcome: Resolved/Met  Goal: Consults, if ordered  Outcome: Resolved/Met  Goal: Nutrition/Diet  Outcome: Resolved/Met  Goal: Discharge Planning  Outcome: Resolved/Met  Goal: Medications  Outcome: Resolved/Met  Goal: Respiratory  Outcome: Resolved/Met  Goal: Treatments/Interventions/Procedures  Outcome: Resolved/Met  Goal: Psychosocial  Outcome: Resolved/Met  Goal: *Verbalizes understanding of type and use of pain medication  Outcome: Resolved/Met  Goal: *Optimal pain control at patient's stated goal  Outcome: Resolved/Met  Goal: *Verbalizes/demonstrates understanding of proper body mechanics and use of stabilization device if ordered  Outcome: Resolved/Met  Goal: *Activity level attained as ordered  Outcome: Resolved/Met  Goal: *Active bowel sounds  Outcome: Resolved/Met  Goal: *Respiratory status stable  Outcome: Resolved/Met  Goal: *Adequate urinary output  Outcome: Resolved/Met  Goal: *Hemodynamically stable  Outcome: Resolved/Met     Problem: Simple Spine Procedure:  Post Op Day 1/Day of Discharge  Goal: Off Pathway (Use only if patient is Off Pathway)  Outcome: Resolved/Met  Goal: Activity/Safety  Outcome: Resolved/Met  Goal: Nutrition/Diet  Outcome: Resolved/Met  Goal: Discharge Planning  Outcome: Resolved/Met  Goal: Medications  Outcome: Resolved/Met  Goal: Respiratory  Outcome: Resolved/Met  Goal: Treatments/Interventions/Procedures  Outcome: Resolved/Met  Goal: Psychosocial  Outcome: Resolved/Met     Problem: Simple Spine Procedure: Discharge Outcomes  Goal: *Optimal pain control at patient's stated goal  Outcome: Resolved/Met  Goal: *Demonstrates ability to place and remove stabilization device  Outcome: Resolved/Met  Goal: *Progress independence mobility/activities (eg: Mobility precautions)  Outcome: Resolved/Met  Goal: *Resumes normal function of bladder and bowel  Outcome: Resolved/Met  Goal: *Lungs clear or at baseline  Outcome: Resolved/Met  Goal: *Verbalizes name, dosage, time, side effects, and number of days to continue medications  Outcome: Resolved/Met  Goal: *Modified independence with transfers, ambulation on levels with assistance devices, stair climbing, ADL's  Outcome: Resolved/Met  Goal: *Describes follow-up/return visits to physicians  Outcome: Resolved/Met  Goal: *Describes available resources and support systems  Outcome: Resolved/Met  Goal: *Labs within defined limits  Outcome: Resolved/Met  Goal: *Tolerating diet  Outcome: Resolved/Met

## 2022-12-16 NOTE — PROGRESS NOTES
Transition of Care Plan  RUR- Observation   DISPOSITION: The disposition plan is home with family assistance and HH  HH: At [address]; AVS Updated   The pt's brother reported that he would assist the pt with dressing changes on the days that the [facility] agency is unable to do so.    DME: Harini Miguel   RW: Awaiting delivery   F/U with PCP/Specialist    Transport: Family      CM: [address]. MSW,   [phone]

## 2022-12-16 NOTE — PROGRESS NOTES
Ortho Daily Progress Note    Date of Surgery:  2022      Patient: Arden Pierre. YOB: 1955  Age: 79 y.o. SUBJECTIVE:   2 Days Post-Op following L2-S1 LUMBAR LAMINECTOMY    The patient states moderate back pain this morning, otherwise without complaint. The patient denies CP, SOB, N/V. +flatus. OBJECTIVE:     Vital Signs:    Temp (24hrs), Av °F (36.7 °C), Min:97.2 °F (36.2 °C), Max:98.7 °F (37.1 °C)    Patient Vitals for the past 24 hrs:   BP Temp Pulse Resp SpO2   22 010 133/60 98.7 °F (37.1 °C) 70 16 97 %   12/15/22 2026 131/69 98.6 °F (37 °C) 72 16 97 %   12/15/22 1427 128/83 97.2 °F (36.2 °C) 70 15 98 %   12/15/22 0858 136/76 97.3 °F (36.3 °C) 68 15 98 %           Physical Exam:  General: A&Ox3, NAD. Respiratory: Respirations are unlabored. Abdomen: S/NT  Surgical site: C,D and I.  Musculoskeletal: Calves are S/NT, Milad dorsi/plantar flexion/EHL intact, distal pulses intact  Neurological:  Milad LE's NVI, no new neuro deficits. Laboratory Values:             Recent Labs     22  0104 12/15/22  0246   HGB 13.8 13.5   CREA  --  0.85   GLU  --  121*     Lab Results   Component Value Date/Time    Sodium 136 12/15/2022 02:46 AM    Potassium 4.2 12/15/2022 02:46 AM    Chloride 106 12/15/2022 02:46 AM    CO2 23 12/15/2022 02:46 AM    Glucose 121 (H) 12/15/2022 02:46 AM    BUN 13 12/15/2022 02:46 AM    Creatinine 0.85 12/15/2022 02:46 AM    Calcium 8.7 12/15/2022 02:46 AM     Hemovac d/c'd    PLAN:     S/P L2-S1 LUMBAR LAMINECTOMY Spine precautions. Mobilize with PT/nursing until discharged. Hemodynamics Stable. Wound Dressing changes daily. Post Operative Pain Oxycodone, Tylenol. DVT Prophylaxis SCD's and/or PER's, encourage bed exercises, mobilize. Discharge Disposition Plan on home today with HH/PT if mobilizing well and safe for discharge. Follow up in Dr Katia Tobin office in 2 weeks for post op care.        Signed By:     Sujey Browning, KARLA  Physician [facility] [address] [place] [address] [phone]  Office [phone]     December 16, 2022 7:37 AM

## 2022-12-16 NOTE — PROGRESS NOTES
Problem: Falls - Risk of  Goal: *Absence of Falls  Description: Document Julissa Franks Fall Risk and appropriate interventions in the flowsheet.   Outcome: Progressing Towards Goal  Note: Fall Risk Interventions:            Medication Interventions: Patient to call before getting OOB                   Problem: Patient Education: Go to Patient Education Activity  Goal: Patient/Family Education  Outcome: Progressing Towards Goal

## 2022-12-16 NOTE — BRIEF OP NOTE
Brief Postoperative Note    Patient: Francesca Nagel. YOB: 1955  MRN: [id number]    Date of Procedure: 12/14/2022     Pre-Op Diagnosis: SPINAL STENOSIS    Post-Op Diagnosis: Same as preoperative diagnosis. Procedure(s):  L2-S1 LUMBAR LAMINECTOMY    Surgeon(s):  Mendel Dyers, MD    Surgical Assistant: Physician Assistant: Luis Cardenas PA-C    Anesthesia: General     Estimated Blood Loss (mL): 136     Complications: None    Specimens: * No specimens in log *     Implants:   Implant Name Type Inv. Item Serial No.  Lot No. LRB No. Used Action   IMPLANT THIN HYDROPHILIC AMNIOTIC MEMEBRANE 4 X 4 CM - [id number]  IMPLANT THIN HYDROPHILIC AMNIOTIC MEMEBRANE 4 X 4 CM [id number] [employer] N/A N/A 1 Implanted   IMPLANT THIN HYDROPHILIC AMNIOTIC MEMEBRANE 4 X 4 CM - [id number]  IMPLANT THIN HYDROPHILIC AMNIOTIC MEMEBRANE 4 X 4 CM [id number] [facility] [employer]_ N/A N/A 1 Implanted       Drains:   [REMOVED] Hemovac Right Back (Removed)   Site Assessment Clean, dry, & intact 12/15/22 0930   Dressing Status Clean, dry, & intact 12/15/22 0930   Drainage Description Serosanguinous 12/15/22 0930   Status Patent; Charged;Draining 12/15/22 0930   Output (ml) 70 ml 12/15/22 1427       Findings: stenosis    Electronically Signed by Kevin Barney PA-C on 12/16/2022 at 7:19 AM

## 2022-12-16 NOTE — PROGRESS NOTES
Problem: Self Care Deficits Care Plan (Adult)  Goal: *Acute Goals and Plan of Care (Insert Text)  Description: FUNCTIONAL STATUS PRIOR TO ADMISSION: Patient was independent and active without use of DME.    HOME SUPPORT: The patient lived alone with brother to provide assistance. Occupational Therapy Goals  Initiated 12/15/2022    1. Patient will perform lower body dressing with modified independence using AE PRN within 4 days. 2.  Patient will perform toileting with modified independence using most appropriate DME within 4 days. 3.  Patient will bathing at modified independence within 4 days. 4.  Patient will don/doff back brace at modified independence within 4 days. 5.  Patient will verbalize/demonstrate 3/3 back precautions during ADL tasks without cues within 4 days. Outcome: Progressing Towards Goal   OCCUPATIONAL THERAPY TREATMENT/: Spine  Patient: Dalton Decker (78 y.o. male)  Date: 12/16/2022  Diagnosis: Lumbar spinal stenosis [M48.061] <principal problem not specified>  Procedure(s) (LRB):  L2-S1 LUMBAR LAMINECTOMY (N/A) 2 Days Post-Op  Precautions: Spinal (brace) No bending, no lifting greater than 5 lbs, no twisting, log-roll technique, repositioning every 20-30 min except when sleeping, quickdraw brace when OOB  Chart, occupational therapy assessment, plan of care, and goals were reviewed. ASSESSMENT:   The patient presents with Stand-by assistance upper body ADLs, Stand-by assistance, Additional time, using AE for lower body ADLs, and Supervision assist functional mobility. Supportive brother present in the room. Pt given AE for LB dressing, reviewed. Pt demonstrated and verbalized understanding. Feel pt is safe for discharge.       The following are barriers to ADL independence while in acute care:   - Cognitive and/or behavioral:  Intact  - Medical condition: functional reach and precautions    - Other:       Prior level of function: independent        PLAN:  Patient continues to benefit from skilled intervention to address the above impairments. Continue treatment per established plan of care. Recommend with staff:   Recommend next OT session:   Discharge recommendations: Home health (to increase independence and safety)  If above is not an option then recommend: None    Barriers to discharging home, in addition to above listed impairments: lives alone  family availability to assist.     Equipment recommendations for successful discharge (if) home: RW     SUBJECTIVE:   Patient stated Aaron Wilkinsring you so much.     OBJECTIVE DATA SUMMARY:   Cognitive/Behavioral Status:  Neurologic State: Alert  Orientation Level: Oriented X4  Cognition: Appropriate decision making        Safety/Judgement: Awareness of environment    Functional Mobility and Transfers for ADLs:  Bed Mobility:       Transfers:  Sit to Stand: Supervision  Functional Transfers  Bathroom Mobility: Supervision/set up       Balance:  Sitting: Intact  Standing: Intact; With support    ADL Intervention:       Grooming  Position Performed: Standing  Brushing Teeth: Supervision                   Lower Body Dressing Assistance  Socks: Stand-by assistance  Leg Crossed Method Used: No  Position Performed: Seated in chair  Adaptive Equipment Used: Reacher;Sock aid         Cognitive Retraining  Safety/Judgement: Awareness of environment    The patient recalled and demonstrated 3/3 back precautions. Reviewed all 3 with patient. Bathing: Patient instructed and indicated understanding when bathing to not submerge wound in water, stand to shower or sponge bathe, cover wound with plastic and tape to ensure no water reaches bandage/wound without cues. Dressing brace: Patient instructed and demonstrated to don/doff velcro on brace using dominant side, keeping non-dominant side intact.  Patient instructed and demonstrated in meantime of being able to stand with back against wall to don/doff brace, to don/doff seated using lap and bed/chair surface to support brace while manipulating. Dressing lower body: Patient instructed to don brace first and on the benefits to remain seated to don all clothing to increase independence with precautions and pain management. Toileting: Patient instructed on the benefits of using flushable wet wipes and toilet tongs if decreased reach or pain for joelle care. Also, the benefits of a reacher to aid in clothing management. Home safety: Patient instructed and indicated understanding on home modifications and safety (raise height of ADL objects, appropriate height of chair surfaces, recliner safety, change of floor surfaces, clear pathways) to increase independence and fall prevention. Standing: Patient instructed and indicated understanding to walk up to sink/counter top/surfaces, step into walker, square off while using objects, slide objects along surfaces, to increase adherence to back precautions and fall prevention. Patient instructed to increase amount of time standing in order to increase independence and tolerance with ADLs. During prolonged standing, can open cabinet door or place foot on stool to decrease spinal pressure/increase pain. Patient instructed and indicated understanding the benefits of maintaining activity tolerance, functional mobility, and independence with self care tasks during acute stay  to ensure safe return home and to baseline. Encouraged patient to increase frequency and duration OOB, not sitting longer than 30 mins without marching/walking with staff, be out of bed for all meals, perform daily ADLs (as approved by RN/MD regarding bathing etc), and performing functional mobility to/from bathroom. Patient instruction and indicated understanding on body mechanics, ergonomics and gravitational force on the spine during different body positions to plan activities in prep for return home to complete instrumental ADLs and back to work safely.        Pain:  Controlled pain    Activity Tolerance: Good  Please refer to the flowsheet for vital signs taken during this treatment.     After treatment patient left:   Up in chair  Caregiver at bedside  Call light within reach     COMMUNICATION/COLLABORATION:   The patients plan of care was discussed with: Physical Therapist    EVERARDO Victor/L  Time Calculation: 17 mins

## 2022-12-16 NOTE — PROGRESS NOTES
Problem: Mobility Impaired (Adult and Pediatric)  Goal: *Acute Goals and Plan of Care (Insert Text)  Description: FUNCTIONAL STATUS PRIOR TO ADMISSION: Patient was independent and active without use of DME. Notes L LE weakness with atrophy at calf, diminished sensation distal L LE. Pt lives alone, but brother will be staying with him to assist at initial d/c. Physical Therapy Goals  Initiated 12/15/2022  1. Patient will move from supine to sit and sit to supine  and roll side to side in bed with modified independence within 7 day(s). 2.  Patient will transfer from bed to chair and chair to bed with modified independence using the least restrictive device within 7 day(s). 3.  Patient will perform sit to stand with modified independence within 7 day(s). 4.  Patient will ambulate with modified independence for 300 feet with the least restrictive device within 7 day(s). 5.  Patient will ascend/descend 6 stairs with single handrail(s) with supervision/set-up within 7 day(s). Outcome: Progressing Towards Goal    PHYSICAL THERAPY TREATMENT  Patient: Dalton Decker (78 y.o. male)  Date: 12/16/2022  Diagnosis: Lumbar spinal stenosis [M48.061] <principal problem not specified>  Procedure(s) (LRB):  L2-S1 LUMBAR LAMINECTOMY (N/A) 2 Days Post-Op  Precautions: Spinal (brace)  Chart, physical therapy assessment, plan of care and goals were reviewed. ASSESSMENT  Patient continues with skilled PT services and is progressing towards goals. Pt tolerates supine to sit to stand with high pain and stiffness, understands that he has been in bed too long and states that he will do more at home. Pt tolerates sitting EOB and ADLs for dressing. Pt tolerates standing at the edge of the bed to don brace. Pt requires assist for adjusting brace to proper place, educated brother on assistance. Pt tolerates gait training in hallway and navigates steps with cues for sequencing and increased attention to task.  Pt tolerates well and appears to clear from PT standpoint. Will continue to follow and progress as able. Current Level of Function Impacting Discharge (mobility/balance): cues and slight assist, can have assist from family    Other factors to consider for discharge:          PLAN :  Patient continues to benefit from skilled intervention to address the above impairments. Continue treatment per established plan of care. to address goals. Recommendation for discharge: (in order for the patient to meet his/her long term goals)  Physical therapy at least 2 days/week in the home     This discharge recommendation:  Has been made in collaboration with the attending provider and/or case management    IF patient discharges home will need the following DME: to be determined (TBD)       SUBJECTIVE:   Patient stated I am ready to go home.     OBJECTIVE DATA SUMMARY:   Critical Behavior:  Neurologic State: Alert  Orientation Level: Oriented X4  Cognition: Appropriate decision making  Safety/Judgement: Awareness of environment  Functional Mobility Training:  Bed Mobility:  Rolling: Minimum assistance (cues)  Supine to Sit: Supervision  Sit to Supine: Supervision           Transfers:  Sit to Stand: Supervision  Stand to Sit: Supervision                             Balance:  Sitting: Intact  Standing: Intact; With support  Ambulation/Gait Training:  Distance (ft): 300 Feet (ft)  Assistive Device: Brace/Splint;Gait belt;Walker, rolling  Ambulation - Level of Assistance: Stand-by assistance        Gait Abnormalities: Decreased step clearance              Speed/Mary: Pace decreased (<100 feet/min)  Step Length: Right shortened;Left shortened               Stairs:  Number of Stairs Trained: 6  Stairs - Level of Assistance: Supervision;Contact guard assistance   Rail Use: Right     Pain Rating:  Controlled but high    Activity Tolerance:   Good, Fair, and requires rest breaks    After treatment patient left in no apparent distress:   Sitting in chair, Call bell within reach, and Caregiver / family present    COMMUNICATION/COLLABORATION:   The patients plan of care was discussed with: Registered nurse and Case management.      Dub Hamman Hutcherson, PT   Time Calculation: 40 mins

## 2022-12-21 ENCOUNTER — TELEPHONE (OUTPATIENT)
Dept: ORTHOPEDIC SURGERY | Age: 67
End: 2022-12-21

## 2022-12-21 NOTE — TELEPHONE ENCOUNTER
Patient srini to report left foot swelling, he denies any pain, shinny or red coloring or warm to the touch. He reports the [address] nurse was not concerned for blood clots. Patient advised of S/S to report and when to seek urgent care.  He will touch base on Friday

## 2022-12-29 ENCOUNTER — OFFICE VISIT (OUTPATIENT)
Dept: ORTHOPEDIC SURGERY | Age: 67
End: 2022-12-29
Payer: MEDICARE

## 2022-12-29 VITALS — HEIGHT: 72 IN | BODY MASS INDEX: 23.03 KG/M2 | WEIGHT: 170 LBS

## 2022-12-29 DIAGNOSIS — Z98.890 S/P LUMBAR LAMINECTOMY: Primary | ICD-10-CM

## 2022-12-29 NOTE — PROGRESS NOTES
1. Have you been to the ER, urgent care clinic since your last visit? Hospitalized since your last visit? No    2. Have you seen or consulted any other health care providers outside of the [address] [place] [address] since your last visit? Include any pap smears or colon screening.  No    Chief Complaint   Patient presents with    Surgical Follow-up     Sx 12/14/22 L2/S1 Laminectomy (PO#1)

## 2022-12-30 NOTE — PROGRESS NOTES
Francesca Bermudez (: 1955) is a 79 y.o. male, patient, here for evaluation of the following chief complaint(s):  Surgical Follow-up (Sx 22 L2/S1 Laminectomy (PO#1))       ASSESSMENT/PLAN:  Below is the assessment and plan developed based on review of pertinent history, physical exam, labs, studies, and medications. Patient presents today approximately 2 weeks status post recent lumbar laminectomy. He is doing very well overall. He is only taking Tylenol for pain at this point. Radiologic findings reviewed with the patient. No incisional difficulties noted today. Continued restrictions discussed. Informed patient to call our office with any worsening of his symptoms. Otherwise, he will follow-up in 4 weeks, at which time we may begin outpatient physical therapy. 1. S/P lumbar laminectomy  -     XR SPINE LUMB 2 OR 3 V; Future      No follow-ups on file. SUBJECTIVE/OBJECTIVE:  Francesca Bermudez (: 1955) is a 79 y.o. male. No flowsheet data found. Patient presents today approximately 2 weeks status post recent L2-S1 lumbar laminectomy. He is doing very well overall. He states he stopped taking Norco approximately 5 days ago, and takes Tylenol as needed for pain. He does report some soreness in the lateral aspect of bilateral hips. He denies any incisional drainage, fevers or chills. He does report an intermittent pain in his leg. Denies any new leg weakness. Denies acute changes in bowel or bladder control. Has been compliant with wearing his QuickDraw brace as well as his bending, lifting and twisting restrictions. Imaging:    XR Results (most recent):  Results from Appointment encounter on 22    XR SPINE LUMB 2 OR 3 V    Narrative  AP and lateral views of the lumbar spine reveal multilevel degenerative changes, most significant at L4-5 and L5-S1. Stable L2-S1 lumbar laminectomy.        Allergies   Allergen Reactions    Ciprofloxacin Myalgia    Codeine Other (comments)     Since allergy to hydrocodone shouldn't take codeine    Hydrocodone Shortness of Breath    Sulfa (Sulfonamide Antibiotics) Myalgia       Current Outpatient Medications   Medication Sig    naloxone (Narcan) 4 mg/actuation nasal spray Use 1 spray intranasally, then discard. Repeat with new spray every 2 min as needed for opioid overdose symptoms, alternating nostrils. Indications: opioid overdose, decrease in rate & depth of breathing due to opioid drug    polyethylene glycol (MIRALAX) 17 gram packet Take 1 Packet by mouth daily as needed for Constipation. Indications: constipation, prn opioid induced constipation    tamsulosin (FLOMAX) 0.4 mg capsule Take 0.4 mg by mouth as needed. cholecalciferol, vitamin D3, 50 mcg (2,000 unit) tab Take  by mouth.    cinnamon bark 500 mg cap Take  by mouth.    garlic 5592 mg tab Take  by mouth. zinc 50 mg tab tablet Take  by mouth daily. selenium 200 mcg cap Take  by mouth. glucosamine/chondr becerril A sod (glucosamine-chondroitin) 750-600 mg tab Take  by mouth two (2) times a day. fish oil-omega-3 fatty acids 340-1,000 mg capsule Take 1 Capsule by mouth daily. cyanocobalamin 1,000 mcg tablet Take 1,000 mcg by mouth daily. calcium carbonate (OS-ANTHONY) 500 mg calcium (1,250 mg) tablet Take  by mouth daily. docusate sodium (COLACE) 100 mg capsule Take 100 mg by mouth two (2) times a day. [place] brand    olmesartan (BENICAR) 40 mg tablet TAKE 1 TABLET DAILY    metoprolol succinate (TOPROL-XL) 25 mg XL tablet TAKE 1 TABLET DAILY    Eliquis 5 mg tablet TAKE 1 TABLET TWICE A DAY    atorvastatin (LIPITOR) 10 mg tablet TAKE 1 TABLET DAILY    clobetasoL (TEMOVATE) 0.05 % ointment Apply 1 Bottle to affected area as needed. ALPRAZolam (XANAX) 0.5 mg tablet Take 1 tab by mouth once before flight for anxiety    dutasteride (AVODART) 0.5 mg capsule Take 0.5 mg by mouth daily. No current facility-administered medications for this visit.        Past Medical History:   Diagnosis Date    Acoustic neuroma ([facility] [address] 2012    R  Marlon/ns    Arthritis     Colonic polyps 2006    Osorio/gi    Diverticulosis 2006    Encounter for screening colonoscopy 07/11/2018    Dr. Olaf Streeter - repeat in 3 years    HTN, goal below 150/90     Hypertrophy of prostate without urinary obstruction and other lower urinary tract symptoms (LUTS)     Dorado/uro    IBS (irritable bowel syndrome)     Lumbar stenosis     severe  Van/os    Other and unspecified hyperlipidemia 07/2011    Paroxysmal atrial fibrillation (HCC)     ASA for anticoagulation. Beta blocker. Unstable angina ([facility] [address]     negative stress test 10/2016        Past Surgical History:   Procedure Laterality Date    BIOPSY PROSTATE  2006    COLONOSCOPY N/A 07/15/2021    . performed by Dina Cushing., MD at [facility] ENDOSCOPY    HX CHOLECYSTECTOMY  2004    [address]    NEUROLOGICAL PROCEDURE UNLISTED  2012    GAMMA KNIFE RADIO SURGERY       Family History   Problem Relation Age of Onset    Diabetes Mother     Hypertension Mother     Cancer Mother         esophageal    Diabetes Father     Hypertension Father     Cancer Father     Diabetes Brother     Hypertension Brother     Stroke Maternal Grandfather     Anesth Problems Neg Hx         Social History     Tobacco Use    Smoking status: Never    Smokeless tobacco: Never   Vaping Use    Vaping Use: Never used   Substance Use Topics    Alcohol use: Yes     Alcohol/week: 2.0 standard drinks     Types: 2 Glasses of wine per week     Comment: occasional    Drug use: No        Review of Systems   Constitutional:  Negative for chills and fever. Musculoskeletal:  Positive for arthralgias and back pain. Vitals:  Ht 5' 11.5\" (1.816 m)   Wt 170 lb (77.1 kg)   BMI 23.38 kg/m²    Body mass index is 23.38 kg/m². Physical Exam  Integumentary  Assessment of Surgical Incision - healing and consistent with normal anticipated wound healing.     Neurologic  Sensory  Light Touch - Intact - Globally. Overall Assessment of Muscle Strength and Tone reveals  Lower Extremities - Right Iliopsoas - 5/5. Left Iliopsoas - 5/5. Right Tibialis Anterior - 5/5. Left Tibialis Anterior - 5/5. Right Gastroc-Soleus - 5/5. Left Gastroc-Soleus - 4/5. Right EHL - 5/5. Left EHL - 5/5. General Assessment of Reflexes  Right Ankle - Clonus is not present. Left Ankle - Clonus is not present. Reflexes (Dermatomes)  2/2 Normal - Left Achilles (L5-S2), Left Knee (L2-4), Right Achilles (L5-S2) and Right Knee (L2-4). Musculoskeletal  Global Assessment  Examination of related systems reveals - well-developed, well-nourished, in no acute distress, alert and oriented x 3 and normal coordination. Spine/Ribs/Pelvis  Lumbosacral Spine - Examination of the lumbosacral spine reveals - no known fractures or deformities. Inspection and Palpation - Tenderness - moderate. Assessment of pain reveals the following findings - The pain is characterized as - moderate. Location - pain refers to lower back bilaterally. Dr. Jaden Johnson was available for immediate consult during this encounter. An electronic signature was used to authenticate this note.   -- FINN Zacarias

## 2023-01-03 RX ORDER — APIXABAN 5 MG/1
TABLET, FILM COATED ORAL
Qty: 180 TABLET | Refills: 3 | Status: SHIPPED | OUTPATIENT
Start: 2023-01-03

## 2023-01-03 RX ORDER — OLMESARTAN MEDOXOMIL 40 MG/1
TABLET ORAL
Qty: 90 TABLET | Refills: 3 | Status: SHIPPED | OUTPATIENT
Start: 2023-01-03

## 2023-01-03 RX ORDER — METOPROLOL SUCCINATE 25 MG/1
TABLET, EXTENDED RELEASE ORAL
Qty: 90 TABLET | Refills: 3 | Status: SHIPPED | OUTPATIENT
Start: 2023-01-03

## 2023-01-03 NOTE — TELEPHONE ENCOUNTER
Refilled per VO per MD    Future Appointments   Date Time Provider Harini Donnellyi   1/26/2023 10:50 AM MD TRAN Joyner BS AMB   6/7/2023 11:40 AM MD TRINH Watkins AMB

## 2023-01-26 ENCOUNTER — OFFICE VISIT (OUTPATIENT)
Dept: ORTHOPEDIC SURGERY | Age: 68
End: 2023-01-26
Payer: MEDICARE

## 2023-01-26 VITALS — HEIGHT: 72 IN | BODY MASS INDEX: 23.03 KG/M2 | WEIGHT: 170 LBS

## 2023-01-26 DIAGNOSIS — Z98.890 S/P LUMBAR LAMINECTOMY: Primary | ICD-10-CM

## 2023-01-26 PROCEDURE — 99024 POSTOP FOLLOW-UP VISIT: CPT | Performed by: ORTHOPAEDIC SURGERY

## 2023-01-26 RX ORDER — GABAPENTIN 300 MG/1
300 CAPSULE ORAL
Qty: 30 CAPSULE | Refills: 0 | Status: SHIPPED | OUTPATIENT
Start: 2023-01-26

## 2023-01-26 NOTE — PROGRESS NOTES
Sathish Richter (: 1955) is a 79 y.o. male, patient, here for evaluation of the following chief complaint(s):  Surgical Follow-up (Sx 22 L2/S1 Laminectomy (PO#2))       ASSESSMENT/PLAN:  Below is the assessment and plan developed based on review of pertinent history, physical exam, labs, studies, and medications. Patient presents today approximately 2 weeks status post recent lumbar laminectomy. He is doing very well overall. He is only taking Tylenol for pain at this point. Radiologic findings reviewed with the patient. No incisional difficulties noted today. He will start some physical therapy. He is also going to follow-up with neurology for the continued fasciculations in his lower extremity. I will start him also on some Neurontin at night. We will see him back in 6 weeks. 1. S/P lumbar laminectomy  -     gabapentin (Neurontin) 300 mg capsule; Take 1 Capsule by mouth nightly. Max Daily Amount: 300 mg., Normal, Disp-30 Capsule, R-0  -     REFERRAL TO PHYSICAL THERAPY; Future      No follow-ups on file. SUBJECTIVE/OBJECTIVE:  Sathish Richter (: 1955) is a 79 y.o. male. No flowsheet data found. Patient presents today approximately 6 weeks status post recent L2-S1 lumbar laminectomy. He is doing very well overall. He states he stopped taking Norco approximately 5 days ago, and takes Tylenol as needed for pain. He does report some soreness in the lateral aspect of bilateral hips. He denies any incisional drainage, fevers or chills. He does report an intermittent pain in his leg. Denies any new leg weakness. Denies acute changes in bowel or bladder control. Has been compliant with wearing his QuickDraw brace as well as his bending, lifting and twisting restrictions. He is 6 weeks out. He has minimal back pain. He is continue fasciculations in the right upper calf region. No increase in atrophy noted though.   He has not started physical therapy yet.    Imaging:    XR Results (most recent):  Results from Appointment encounter on 12/29/22    XR SPINE LUMB 2 OR 3 V    Narrative  AP and lateral views of the lumbar spine reveal multilevel degenerative changes, most significant at L4-5 and L5-S1. Stable L2-S1 lumbar laminectomy. Allergies   Allergen Reactions    Ciprofloxacin Myalgia    Codeine Other (comments)     Since allergy to hydrocodone shouldn't take codeine    Hydrocodone Shortness of Breath    Sulfa (Sulfonamide Antibiotics) Myalgia       Current Outpatient Medications   Medication Sig    gabapentin (Neurontin) 300 mg capsule Take 1 Capsule by mouth nightly. Max Daily Amount: 300 mg.    metoprolol succinate (TOPROL-XL) 25 mg XL tablet TAKE 1 TABLET DAILY    Eliquis 5 mg tablet TAKE 1 TABLET TWICE A DAY    olmesartan (BENICAR) 40 mg tablet TAKE 1 TABLET DAILY    tamsulosin (FLOMAX) 0.4 mg capsule Take 0.4 mg by mouth as needed. cholecalciferol, vitamin D3, 50 mcg (2,000 unit) tab Take  by mouth.    cinnamon bark 500 mg cap Take  by mouth. zinc 50 mg tab tablet Take  by mouth daily. selenium 200 mcg cap Take  by mouth. glucosamine/chondr becerril A sod (glucosamine-chondroitin) 750-600 mg tab Take  by mouth two (2) times a day. fish oil-omega-3 fatty acids 340-1,000 mg capsule Take 1 Capsule by mouth daily. cyanocobalamin 1,000 mcg tablet Take 1,000 mcg by mouth daily. calcium carbonate (OS-ANTHONY) 500 mg calcium (1,250 mg) tablet Take  by mouth daily. docusate sodium (COLACE) 100 mg capsule Take 100 mg by mouth two (2) times a day. Cycle brand    atorvastatin (LIPITOR) 10 mg tablet TAKE 1 TABLET DAILY    clobetasoL (TEMOVATE) 0.05 % ointment Apply 1 Bottle to affected area as needed. dutasteride (AVODART) 0.5 mg capsule Take 0.5 mg by mouth daily. naloxone (Narcan) 4 mg/actuation nasal spray Use 1 spray intranasally, then discard.  Repeat with new spray every 2 min as needed for opioid overdose symptoms, alternating nostrils. Indications: opioid overdose, decrease in rate & depth of breathing due to opioid drug    polyethylene glycol (MIRALAX) 17 gram packet Take 1 Packet by mouth daily as needed for Constipation. Indications: constipation, prn opioid induced constipation    garlic 5471 mg tab Take  by mouth. ALPRAZolam (XANAX) 0.5 mg tablet Take 1 tab by mouth once before flight for anxiety     No current facility-administered medications for this visit. Past Medical History:   Diagnosis Date    Acoustic neuroma ([facility] [address] 2012    R  Marlon/ns    Arthritis     Colonic polyps 2006    Osorio/gi    Diverticulosis 2006    Encounter for screening colonoscopy 07/11/2018    Dr. Orellana Sa - repeat in 3 years    HTN, goal below 150/90     Hypertrophy of prostate without urinary obstruction and other lower urinary tract symptoms (LUTS)     Dorado/uro    IBS (irritable bowel syndrome)     Lumbar stenosis     severe  Van/os    Other and unspecified hyperlipidemia 07/2011    Paroxysmal atrial fibrillation (HCC)     ASA for anticoagulation. Beta blocker. Unstable angina ([facility] [address]     negative stress test 10/2016        Past Surgical History:   Procedure Laterality Date    BIOPSY PROSTATE  2006    COLONOSCOPY N/A 07/15/2021    . performed by Robert Yuen MD at [facility] ENDOSCOPY    HX CHOLECYSTECTOMY  2004    HX HERNIA REPAIR  1996    [place] UNLISTED NEUROLOGICAL/NEUROMUSCULAR DX [address]  2012    GAMMA KNIFE RADIO SURGERY       Family History   Problem Relation Age of Onset    Diabetes Mother     Hypertension Mother     Cancer Mother         esophageal    Diabetes Father     Hypertension Father     Cancer Father     Diabetes Brother     Hypertension Brother     Stroke Maternal Grandfather     Anesth Problems Neg Hx         Social History     Tobacco Use    Smoking status: Never    Smokeless tobacco: Never   Vaping Use    Vaping Use: Never used   Substance Use Topics    Alcohol use:  Yes     Alcohol/week: 2.0 standard drinks     Types: 2 Glasses of wine per week     Comment: occasional    Drug use: No        Review of Systems   Constitutional:  Negative for chills and fever. Musculoskeletal:  Positive for arthralgias and back pain. All other systems reviewed and are negative. Vitals:  Ht 5' 11.5\" (1.816 m)   Wt 170 lb (77.1 kg)   BMI 23.38 kg/m²    Body mass index is 23.38 kg/m². Physical Exam  Integumentary  Assessment of Surgical Incision - healing and consistent with normal anticipated wound healing. Neurologic  Sensory  Light Touch - Intact - Globally. Overall Assessment of Muscle Strength and Tone reveals  Lower Extremities - Right Iliopsoas - 5/5. Left Iliopsoas - 5/5. Right Tibialis Anterior - 5/5. Left Tibialis Anterior - 5/5. Right Gastroc-Soleus - 5/5. Left Gastroc-Soleus - 4/5. Right EHL - 5/5. Left EHL - 5/5. General Assessment of Reflexes  Right Ankle - Clonus is not present. Left Ankle - Clonus is not present. Reflexes (Dermatomes)  2/2 Normal - Left Achilles (L5-S2), Left Knee (L2-4), Right Achilles (L5-S2) and Right Knee (L2-4). Musculoskeletal  Global Assessment  Examination of related systems reveals - well-developed, well-nourished, in no acute distress, alert and oriented x 3 and normal coordination. Spine/Ribs/Pelvis  Lumbosacral Spine - Examination of the lumbosacral spine reveals - no known fractures or deformities. Inspection and Palpation - Tenderness - moderate. Assessment of pain reveals the following findings - The pain is characterized as - moderate. Location - pain refers to lower back bilaterally. An electronic signature was used to authenticate this note.   -- Robb George MD

## 2023-01-26 NOTE — PROGRESS NOTES
1. Have you been to the ER, urgent care clinic since your last visit? Hospitalized since your last visit? No    2. Have you seen or consulted any other health care providers outside of the [address] [place] [address] since your last visit? Include any pap smears or colon screening.  No    Chief Complaint   Patient presents with    Surgical Follow-up     Sx 12/14/22 L2/S1 Laminectomy (PO#2)

## 2023-01-26 NOTE — PATIENT INSTRUCTIONS
Spondylolysis and Spondylolisthesis: Exercises  Introduction  Here are some examples of exercises for you to try. The exercises may be suggested for a condition or for rehabilitation. Start each exercise slowly. Ease off the exercises if you start to have pain. You will be told when to start these exercises and which ones will work best for you. How to do the exercises  Single knee-to-chest  Lie on your back with your knees bent and your feet flat on the floor. You can put a small pillow under your head and neck if it is more comfortable. Bring one knee to your chest, keeping the other foot flat on the floor. Keep your lower back pressed to the floor. Hold for 15 to 30 seconds. Relax, and lower the knee to the starting position. Repeat with the other leg. Repeat 2 to 4 times with each leg. To get more stretch, put your other leg flat on the floor while pulling your knee to your chest.  Double knee-to-chest  Lie on your back with your knees bent and your feet flat on the floor. You can put a small pillow under your head and neck if it is more comfortable. Bring both knees to your chest.  Keep your lower back pressed to the floor. Hold for 15 to 30 seconds. Relax, and lower your knees to the starting position. Repeat 2 to 4 times. Alternate arm and leg (bird dog) exercise  Do this exercise slowly. Try to keep your body straight at all times. Start on the floor, on your hands and knees. Tighten your belly muscles by pulling your belly button in toward your spine. Be sure you continue to breathe normally and do not hold your breath. Raise one arm off the floor, and hold it straight out in front of you. Be careful not to let your shoulder drop down, because that will twist your trunk. Hold for about 6 seconds, then lower your arm and switch to your other arm. Repeat 8 to 12 times on each arm. When you can do this exercise with ease and no pain, repeat steps 1 through 5.  But this time do it with one leg raised off the floor, holding your leg straight out behind you. Be careful not to let your hip drop down, because that will twist your trunk. When holding your leg straight out becomes easier, try raising your opposite arm at the same time, and repeat steps 1 through 5. Bridging  Lie on your back with both knees bent. Your knees should be bent about 90 degrees. Then push your feet into the floor, squeeze your buttocks, and lift your hips off the floor until your shoulders, hips, and knees are all in a straight line. Hold for about 6 seconds as you continue to breathe normally, and then slowly lower your hips back down to the floor and rest for up to 10 seconds. Repeat 8 to 12 times. Curl-ups  Lie on the floor on your back with your knees bent at a 90-degree angle. Your feet should be flat on the floor, about 12 inches from your buttocks. Cross your arms over your chest. If this bothers your neck, try putting your hands behind your neck (not your head), with your elbows spread apart. Slowly tighten your belly muscles and raise your shoulder blades off the floor. Keep your head in line with your body, and do not press your chin to your chest.  Hold this position for 1 or 2 seconds, then slowly lower yourself back down to the floor. Repeat 8 to 12 times. Plank  Do this exercise slowly. Try to keep your body straight at all times, and do not let one hip drop lower than the other. Lie on your stomach, resting your upper body on your forearms. Tighten your belly muscles by pulling your belly button in toward your spine. Keeping your knees on the floor, press down with your forearms to lift your upper body off the floor. Hold for about 6 seconds, then lower your body to the floor. Rest for up to 10 seconds. Repeat 8 to 12 times. Over time, work up to holding for 15 to 30 seconds each time.   If this exercise is easy to do with your knees on the floor, try doing this exercise with your knees and legs straight, supported by your toes on the floor. Follow-up care is a key part of your treatment and safety. Be sure to make and go to all appointments, and call your doctor if you are having problems. It's also a good idea to know your test results and keep a list of the medicines you take. Current as of: March 9, 2022               Content Version: 13.4  © 2006-2022 [employer], [employer]. Care instructions adapted under license by [employer] (which disclaims liability or warranty for this information). If you have questions about a medical condition or this instruction, always ask your healthcare professional. [place] [address] any warranty or liability for your use of this information.

## 2023-01-30 ENCOUNTER — OFFICE VISIT (OUTPATIENT)
Dept: ORTHOPEDIC SURGERY | Age: 68
End: 2023-01-30
Payer: MEDICARE

## 2023-01-30 DIAGNOSIS — M54.50 CHRONIC BILATERAL LOW BACK PAIN WITHOUT SCIATICA: Primary | ICD-10-CM

## 2023-01-30 DIAGNOSIS — M48.061 SPINAL STENOSIS OF LUMBAR REGION, UNSPECIFIED WHETHER NEUROGENIC CLAUDICATION PRESENT: ICD-10-CM

## 2023-01-30 DIAGNOSIS — G89.29 CHRONIC BILATERAL LOW BACK PAIN WITHOUT SCIATICA: Primary | ICD-10-CM

## 2023-01-30 DIAGNOSIS — Z98.890 S/P LUMBAR LAMINECTOMY: ICD-10-CM

## 2023-01-30 PROCEDURE — 97161 PT EVAL LOW COMPLEX 20 MIN: CPT | Performed by: PHYSICAL THERAPIST

## 2023-01-30 PROCEDURE — 97110 THERAPEUTIC EXERCISES: CPT | Performed by: PHYSICAL THERAPIST

## 2023-01-30 NOTE — PROGRESS NOTES
Shelly Livingston. (: 1955) is a 79 y.o. Back Pain and Leg Pain       Patient Name: Shelly Livingston. Date:2023  : 1955  [x]  Patient  Verified  Payor: Dion Lopez / Plan: [place] PART [place] & B / Product Type: Medicare /    Loni Martinez MD  Total Treatment Time (min): 45  Total Timed Codes (min): 45  1:1 Treatment Time ( [address] only): 39  Visit #:  20      Treatment Area: Lumbar    ASSESSMENT/PLAN:  Below is the assessment and plan developed based on review of pertinent history, physical exam, labs, studies, and medications. Patient comes in today 6 weeks status post lumbar laminectomy and presents with physical therapy deficits including gait, strength, mobility, flexibility, and biomechanics. He will benefit from a physical therapy program to address above-mentioned deficits. Short-term goals: To become independent with today's prescribed home exercise program in 1 week. Long-term goals: Progress with a physical therapy program so that patient demonstrates improved lower extremity and core strength, flexibility, and endurance allowing him to transition back into a regular exercise program and tolerate activities of daily living reporting less than 3/10 low back pain and no episodes of instability in the next 4 to 8 weeks. Additionally, patient will score a clinically significant improvement of 20 points on the modified Oswestry scale in the next 4 to 8 weeks. Patient will be seen twice a week for up to 20 visits  with focus on progressive restoration of range of motion and strength, balance, and functional mobility. Therapeutic applications will include but are not limited to:Manual therapy, joint mobilization, myofascial release, therapeutic exercises. Modalities including ultrasound and electric stimulation heat and ice. Kinesiotape and Ugalde taping for joint reeducation and approximation of tissue for neuromuscular reeducation.       1. Chronic bilateral low back pain without sciatica  2. S/P lumbar laminectomy  -     REFERRAL TO PHYSICAL THERAPY  3. Spinal stenosis of lumbar region, unspecified whether neurogenic claudication present      Return in about 2 days (around 2/1/2023). SUBJECTIVE:  HPI  Patient reports improvement since surgery. He is now walking [id number] steps a day. Patient still complains of back stiffness as well as pain in his left growing and stiffness to the back of his left knee and calf. He has weakness with his left calf that has now been going on for a few years. He still experiences numbness into the top of second through fourth toes bilaterally. Patient is  and retired. Please see patient's medical chart for detailed list of current medications as well as significant past medical history. OBJECTIVE:  Evaluation (20 minutes)  Patient comes in today demonstrating antalgic gait; he has discoordination to both feet with right foot turnout. Relative difficulty with single-leg balance on the left side. He is unable to do a single-leg heel raise on the left side. Lower Quarter scan reveals diminished reflex to bilateral knee and ankle. Weakness with resisted ankle inversion and plantar flexion graded 4+/5. Patient has appreciable atrophy to left calf measuring 3.5 cm less than contralateral side circumferentially. Left quad atrophy measuring 1.5 cm less than contralateral side at VMO and mid quadriceps levels. Patient test negative for straight leg raise, discogenic, and SI provocative test bilaterally. Weak to fair with basic phase 1 core stabilization test.  Surgical incision is healing with no signs of infection. There is subdermal adhesion around surgical scar line. Hypertonicity to lumbar paraspinal.  Hypomobility throughout lumbar spine. Flexibility restriction to bilateral hamstring and gastroc.     Modified Oswestry: 38%        Exercise(mins 25)  With today's interventions we initiated exercises for gait training, flexibility, balance, and strengthening for patient perform at home on a daily basis. Today's exercises include NuStep, single-leg balance, straight leg raise, posterior pelvic tilt, bridging, calf stretch, and hamstring stretch. An electronic signature was used to authenticate this note.   -- Han Arguello, PT

## 2023-02-01 ENCOUNTER — OFFICE VISIT (OUTPATIENT)
Dept: ORTHOPEDIC SURGERY | Age: 68
End: 2023-02-01
Payer: MEDICARE

## 2023-02-01 DIAGNOSIS — M48.061 SPINAL STENOSIS OF LUMBAR REGION, UNSPECIFIED WHETHER NEUROGENIC CLAUDICATION PRESENT: Primary | ICD-10-CM

## 2023-02-01 DIAGNOSIS — R26.0 ATAXIC GAIT: ICD-10-CM

## 2023-02-01 NOTE — PROGRESS NOTES
Kita Byrne. (: 1955) is a 79 y.o. Back Pain       Patient Name: Kita Byrne. Date:2023  : 1955  [x]  Patient  Verified  Payor: Anil Assaria / Plan: [place] / Product Type: Medicare /    Helen Hodges MD  Total Treatment Time (min): 45  Total Timed Codes (min): 45  1:1 Treatment Time ( [address] only): 39  Visit #: 2 of 20      Treatment Area: Lumbar    ASSESSMENT/PLAN:  Below is the assessment and plan developed based on review of pertinent history, physical exam, labs, studies, and medications. Patient's gait pattern improved today after practicing step length and providing verbal cues. He is over rotating through his pelvis second to hip flexor tightness. Continue with current plan of care and progress towards long-term goals. 1. Spinal stenosis of lumbar region, unspecified whether neurogenic claudication present  2. Ataxic gait      Return in about 5 days (around 2023). SUBJECTIVE:  HPI  Doing well with exercises. We will back pain. OBJECTIVE:      Manual (10 minutes)  Bilateral hamstring and piriformis stretch. Patient was positioned in right side-lying with pelvic rock and lumbar gapping techniques. Exercise(mins 35)  Today's exercises include NuStep, single-leg balance, straight leg raise, cup walk, gait: No hip drop. Decreased trunk rotation. Feet are neutrally rotated. Gait sequence and stride are relatively normal.  Training, posterior pelvic tilt, walk, piriformis stretch, marching and core pulldown, bridging, calf stretch, and hamstring stretch. An electronic signature was used to authenticate this note.   -- Jamshid Wilson, PT

## 2023-02-03 ENCOUNTER — TRANSCRIBE ORDER (OUTPATIENT)
Dept: SCHEDULING | Age: 68
End: 2023-02-03

## 2023-02-03 DIAGNOSIS — R10.9 UNSPECIFIED ABDOMINAL PAIN: Primary | ICD-10-CM

## 2023-02-06 ENCOUNTER — OFFICE VISIT (OUTPATIENT)
Dept: ORTHOPEDIC SURGERY | Age: 68
End: 2023-02-06
Payer: MEDICARE

## 2023-02-06 ENCOUNTER — HOSPITAL ENCOUNTER (OUTPATIENT)
Dept: ULTRASOUND IMAGING | Age: 68
Discharge: HOME | End: 2023-02-06
Attending: FAMILY MEDICINE
Payer: MEDICARE

## 2023-02-06 DIAGNOSIS — R10.9 UNSPECIFIED ABDOMINAL PAIN: ICD-10-CM

## 2023-02-06 DIAGNOSIS — R26.0 ATAXIC GAIT: Primary | ICD-10-CM

## 2023-02-06 DIAGNOSIS — Z98.890 S/P LUMBAR LAMINECTOMY: ICD-10-CM

## 2023-02-06 PROCEDURE — 76700 US EXAM ABDOM COMPLETE: CPT

## 2023-02-06 NOTE — PROGRESS NOTES
Milton Baires. (: 1955) is a 79 y.o. No chief complaint on file. Patient Name: Milton Baires. Date:2023  : 1955  [x]  Patient  Verified  Payor: Thurston Nip / Plan: [place] MED[place]ARE PART A & B / Product Type: Medicare /    Lauri Sanabria MD  Total Treatment Time (min): 45  Total Timed Codes (min): 45  1:1 Treatment Time ( [address] only): 39  Visit #: 3 of 20      Treatment Area: Lumbar    ASSESSMENT/PLAN:  Below is the assessment and plan developed based on review of pertinent history, physical exam, labs, studies, and medications. Progressing well with core stability exercises. Challenged by hip abduction. Continues to have hip flexor tightness. We will continue to work with patient so that he demonstrates improved lower extremity core and gluteal strength, hip flexibility, and endurance allowing him to transition back into a regular exercise program and tolerate activities of daily living with decreased lumbar pain. 1. Ataxic gait  2. S/P lumbar laminectomy      Return in about 2 days (around 2023) for continue thearpy for back. SUBJECTIVE:  HPI    Stated that he is is able to complete exercise from initial evaluation. OBJECTIVE:    Function/ Strength: decreased abductor strength noted    Manual (15 minutes)  Bilateral hamstring and piriformis stretch. XFM to scar tissue with patient in prone. Lumbar STM bilaterally. Modalities:   Declined     Exercise(mins 30)  Today's exercises include NuStep, single-leg balance, straight leg raise, cup walk, posterior pelvic tilt, walk, piriformis stretch, marching and core pulldown, bridging, calf stretch, hip ext,  and hamstring stretch.     Co-treated by BEV Thomas 23 8:22AM

## 2023-02-08 ENCOUNTER — OFFICE VISIT (OUTPATIENT)
Dept: ORTHOPEDIC SURGERY | Age: 68
End: 2023-02-08
Payer: MEDICARE

## 2023-02-08 DIAGNOSIS — R26.0 ATAXIC GAIT: Primary | ICD-10-CM

## 2023-02-08 DIAGNOSIS — Z98.890 S/P LUMBAR LAMINECTOMY: ICD-10-CM

## 2023-02-08 PROCEDURE — 97140 MANUAL THERAPY 1/> REGIONS: CPT

## 2023-02-08 PROCEDURE — 97110 THERAPEUTIC EXERCISES: CPT

## 2023-02-08 NOTE — PROGRESS NOTES
Lisa Mackay. (: 1955) is a 79 y.o. Back Pain       Patient Name: Lisa Mackay. Date:2023  : 1955  [x]  Patient  Verified  Payor: Skyler Frost / Plan: [place] MEDICARE PART A & B / Product Type: Medicare /    Mk Bowen MD  Total Treatment Time (min): 45  Total Timed Codes (min): 45  1:1 Treatment Time ( [address] only): 39  Visit #: 4 of 20      Treatment Area: Lumbar    ASSESSMENT/PLAN:  Below is the assessment and plan developed based on review of pertinent history, physical exam, labs, studies, and medications. Patient was able to isolate muscles during hip extension exercise with UE support today. Continues to have ataxic gait. Working to decrease hip flexor tightness and improve trunk stability. We will continue to work with patient on core and gluteal strength, hip flexibility, and endurance to ambulate in the community safely with decreased ataxia. 1. Ataxic gait  2. S/P lumbar laminectomy      Return in about 2 days (around 2/10/2023) for Continue thearpy for back. SUBJECTIVE:  HPI    Stated atroph of left leg compared to right due to nerve damage. OBJECTIVE:    Function/ Strength: decreased abductor strength noted    Manual (15 minutes)  Bilateral hamstring and piriformis stretch. XFM to scar tissue with patient in prone. Lumbar STM bilaterally. Modalities:   Declined     Exercise(mins 30)  Today's exercises include NuStep, single-leg balance, straight leg raise, cup walk, posterior pelvic tilt, walk, piriformis stretch, marching and core pulldown, bridging, calf stretch, hip ext,  and hamstring stretch.     Co-treated by BEV Metz 23 12:26PM

## 2023-02-14 ENCOUNTER — OFFICE VISIT (OUTPATIENT)
Dept: ORTHOPEDIC SURGERY | Age: 68
End: 2023-02-14
Payer: MEDICARE

## 2023-02-14 DIAGNOSIS — Z98.890 S/P LUMBAR LAMINECTOMY: ICD-10-CM

## 2023-02-14 DIAGNOSIS — R26.0 ATAXIC GAIT: Primary | ICD-10-CM

## 2023-02-14 NOTE — PROGRESS NOTES
Noah Mail. (: 1955) is a 79 y.o. No chief complaint on file. Patient Name: Noah Manuel. Date:2023  : 1955  [x]  Patient  Verified  Payor: Stephanie Murguia / Plan: [place] MEDICARE PART A & B / Product Type: Medicare /    Anita Saavedra MD  Total Treatment Time (min): 45  Total Timed Codes (min): 45  1:1 Treatment Time ( [address] only): 39  Visit #: 5 of 20      Treatment Area: Lumbar    ASSESSMENT/PLAN:  Below is the assessment and plan developed based on review of pertinent history, physical exam, labs, studies, and medications. Improved gluteal strength. We have updated his HEP to progress glute strength at home. Tolerated progression of back strengthening program. Continues to relay on hip flexors to ambulate and stand. We will continue to work with patient on core and gluteal strength, hip flexibility, and endurance to ambulate in the community safely with decreased ataxia. 1. Ataxic gait  2. S/P lumbar laminectomy      Return in about 2 days (around 2023) for Continue therapy for back. SUBJECTIVE:  HPI     Stated that he is able to complete 100 bridges at home. OBJECTIVE:    Function/ Strength: decreased abductor strength noted    Manual (15 minutes)  Bilateral hamstring and piriformis stretch. XFM to scar tissue with patient in prone. Lumbar STM bilaterally. Modalities:   Declined     Exercise(mins 30)  Today's exercises include NuStep, single-leg balance, straight leg raise, cup walk, posterior pelvic tilt, walk, piriformis stretch, marching and core pulldown,SL bridging, calf stretch, hip ext,  band walks, lat pull down, and hamstring stretch.     Co-treated by BEV Alas

## 2023-02-16 ENCOUNTER — OFFICE VISIT (OUTPATIENT)
Dept: ORTHOPEDIC SURGERY | Age: 68
End: 2023-02-16
Payer: MEDICARE

## 2023-02-16 DIAGNOSIS — Z98.890 S/P LUMBAR LAMINECTOMY: ICD-10-CM

## 2023-02-16 DIAGNOSIS — R26.0 ATAXIC GAIT: Primary | ICD-10-CM

## 2023-02-16 DIAGNOSIS — M48.061 SPINAL STENOSIS OF LUMBAR REGION, UNSPECIFIED WHETHER NEUROGENIC CLAUDICATION PRESENT: ICD-10-CM

## 2023-02-16 NOTE — PROGRESS NOTES
Pepe Lyons (: 1955) is a 79 y.o. Back Pain       Patient Name: Pepe Coronel. Date:2023  : 1955  [x]  Patient  Verified  Payor: Sam Andrzej / Plan: [place] MEDICARE PART A & B / Product Type: Medicare /    Alon King MD  Total Treatment Time (min): 45  Total Timed Codes (min): 45  1:1 Treatment Time ( [address] only): 39  Visit #: 6 of 20      Treatment Area: Lumbar    ASSESSMENT/PLAN:  Below is the assessment and plan developed based on review of pertinent history, physical exam, labs, studies, and medications. Progressing well with lumbar range of motion. Continues to have ataxic gait due to iliopsoas tension and weak abductors. We will continue to per plan of care towards lumbar range of motion, functional strength, and a symmetrical gait pattern. 1. Ataxic gait  2. S/P lumbar laminectomy  3. Spinal stenosis of lumbar region, unspecified whether neurogenic claudication present      Return in about 5 days (around 2023) for Continue therapy for back. SUBJECTIVE:  HPI     No new complaints. OBJECTIVE:    Function/ Strength: decreased abductor strength noted    Manual (15 minutes)  Bilateral hamstring and piriformis stretch. XFM to scar tissue with patient in prone. Lumbar STM bilaterally. Modalities:   Declined     Exercise(mins 30)  Today's exercises include NuStep, single-leg balance, straight leg raise, cup walk, posterior pelvic tilt, walk, piriformis stretch, marching and core pulldown,SL bridging, calf stretch, hip ext,  hip hikes,  lat pull down, and hamstring stretch, lumbar flexion/ ext.     Co-treated by BEV Valadez

## 2023-02-21 ENCOUNTER — OFFICE VISIT (OUTPATIENT)
Dept: ORTHOPEDIC SURGERY | Age: 68
End: 2023-02-21
Payer: MEDICARE

## 2023-02-21 DIAGNOSIS — Z98.890 S/P LUMBAR LAMINECTOMY: ICD-10-CM

## 2023-02-21 DIAGNOSIS — R26.0 ATAXIC GAIT: Primary | ICD-10-CM

## 2023-02-21 PROCEDURE — 97140 MANUAL THERAPY 1/> REGIONS: CPT

## 2023-02-21 PROCEDURE — 97110 THERAPEUTIC EXERCISES: CPT

## 2023-02-21 NOTE — PROGRESS NOTES
Mariza Areas. (: 1955) is a 79 y.o. Back Pain       Patient Name: Mariza Peña. Date:2023  : 1955  [x]  Patient  Verified  Payor: Ashley Enciso / Plan: [place] MEDICARE PART A & B / Product Type: Medicare /    Betzaida Hough MD  Total Treatment Time (min): 45  Total Timed Codes (min): 45  1:1 Treatment Time ( [address] only): 39  Visit #: 7 of 20      Treatment Area: Lumbar    ASSESSMENT/PLAN:  Below is the assessment and plan developed based on review of pertinent history, physical exam, labs, studies, and medications. Improved tolerance to hip flexor and piriformis stretching this session. Improved gait pattern since starting therapy. Noted decreased stride length on right with left hip drop. We will continue to to work on symmetrical gait pattern with strength and flexibility. 1. Ataxic gait  2. S/P lumbar laminectomy      Return in about 1 day (around 2023) for Continue therapy for back pain. SUBJECTIVE:  HPI     Stated that he was sore after traveling to [place]. OBJECTIVE:    Function/ Strength:     Manual (15 minutes)  Bilateral hamstring and piriformis stretch. XFM to scar tissue with patient in prone. Lumbar STM bilaterally. Long axis traction bilaterally, knee to chest stretch. Modalities:   Declined     Exercise(mins 30)  Today's exercises include TM SL-DL, single-leg balance, straight leg raise, cup walk, posterior pelvic tilt, walk, piriformis stretch, marching and core pulldown,SL bridging, calf stretch, hip ext,  hip hikes,  lat pull down, and hamstring stretch, lumbar flexion/ ext.     Co-treated by BEV Levy

## 2023-02-23 ENCOUNTER — OFFICE VISIT (OUTPATIENT)
Dept: ORTHOPEDIC SURGERY | Age: 68
End: 2023-02-23
Payer: MEDICARE

## 2023-02-23 DIAGNOSIS — M48.061 SPINAL STENOSIS OF LUMBAR REGION, UNSPECIFIED WHETHER NEUROGENIC CLAUDICATION PRESENT: ICD-10-CM

## 2023-02-23 DIAGNOSIS — R26.0 ATAXIC GAIT: Primary | ICD-10-CM

## 2023-02-23 NOTE — PROGRESS NOTES
Benton Gomez. (: 1955) is a 79 y.o. Back Pain       Patient Name: Benton Gomez. Date:2023  : 1955  [x]  Patient  Verified  Payor: Gume Phillips / Plan: [place] MEDICARE PART A & B / Product Type: Medicare /    Caitlyn Peñaloza MD  Total Treatment Time (min): 45  Total Timed Codes (min): 45  1:1 Treatment Time ( [address] only): 39  Visit #: 8 of 20      Treatment Area: Lumbar    ASSESSMENT/PLAN:  Below is the assessment and plan developed based on review of pertinent history, physical exam, labs, studies, and medications. Improving with left hip strength which is helping with gait and going down stairs. Cont with current poc and progress toward ltgs. 1. Ataxic gait  2. Spinal stenosis of lumbar region, unspecified whether neurogenic claudication present      Return in about 1 week (around 3/2/2023). SUBJECTIVE:  HPI     Doing some new exercises at home that were recommended by his health . OBJECTIVE:    Function/ Strength:     Manual (10 minutes)  XFM to scar tissue with patient in prone. Lumbar STM bilaterally. Grade 2-3 PA mobs to mid thoracic with pt in prone. Modalities:   Declined     Exercise(mins 35)  Today's exercises include TM SL-DL, resisted abduction walk, single-leg balance, straight leg raise, cup walk, posterior pelvic tilt, walk, piriformis stretch, marching and core pulldown,SL bridging, calf stretch, hip ext,  hip hikes,  lat pull down, and hamstring stretch, lumbar flexion/ ext.

## 2023-02-27 ENCOUNTER — OFFICE VISIT (OUTPATIENT)
Dept: ORTHOPEDIC SURGERY | Age: 68
End: 2023-02-27
Payer: MEDICARE

## 2023-02-27 DIAGNOSIS — R26.0 ATAXIC GAIT: Primary | ICD-10-CM

## 2023-02-27 DIAGNOSIS — M48.061 SPINAL STENOSIS OF LUMBAR REGION, UNSPECIFIED WHETHER NEUROGENIC CLAUDICATION PRESENT: ICD-10-CM

## 2023-02-27 PROCEDURE — 97110 THERAPEUTIC EXERCISES: CPT

## 2023-02-27 PROCEDURE — 97140 MANUAL THERAPY 1/> REGIONS: CPT

## 2023-02-27 NOTE — PROGRESS NOTES
Anju Perea. (: 1955) is a 79 y.o. Back Pain       Patient Name: Anju Perea. Date:2023  : 1955  [x]  Patient  Verified  Payor: Laurie Frederick / Plan: [place] MEDICARE PART A & B / Product Type: Medicare /    Devon Reynoso MD  Total Treatment Time (min): 45  Total Timed Codes (min): 45  1:1 Treatment Time ( [address] only): 45  Visit #: 9 of 20      Treatment Area: Lumbar    ASSESSMENT/PLAN:  Below is the assessment and plan developed based on review of pertinent history, physical exam, labs, studies, and medications. Patient making steady gains towards corrective pelvic alignment. Continues to demonstrate an ataxic gait with heavy reliance on hip flexors. Cont with current poc and progress toward ltgs. 1. Ataxic gait  2. Spinal stenosis of lumbar region, unspecified whether neurogenic claudication present      Return in about 2 days (around 3/1/2023) for continue therapy for back. SUBJECTIVE:  HPI     Working on upper body strengthening at home. OBJECTIVE:    Function/ Strength: left hip drop, short step length right, and tight right hip flexor. Manual (10 minutes)  XFM to scar tissue with patient in prone. Lumbar STM bilaterally. Grade 2-3 PA mobs to mid thoracic with pt in prone. Modalities:   Ice     Exercise(mins 35)  Today's exercises include TM SL-DL, resisted abduction walk, single-leg balance, straight leg raise, cup walk, posterior pelvic tilt, walk, piriformis stretch, marching and core pulldown,SL bridging, calf stretch, hip ext,  hip hikes,  lat pull down, and hamstring stretch, lumbar flexion/ ext.     --Co-treated by BEV Fregoso

## 2023-03-01 ENCOUNTER — DOCUMENTATION ONLY (OUTPATIENT)
Dept: ORTHOPEDIC SURGERY | Age: 68
End: 2023-03-01

## 2023-03-01 ENCOUNTER — OFFICE VISIT (OUTPATIENT)
Dept: ORTHOPEDIC SURGERY | Age: 68
End: 2023-03-01

## 2023-03-01 DIAGNOSIS — M48.061 SPINAL STENOSIS OF LUMBAR REGION, UNSPECIFIED WHETHER NEUROGENIC CLAUDICATION PRESENT: ICD-10-CM

## 2023-03-01 DIAGNOSIS — R26.0 ATAXIC GAIT: Primary | ICD-10-CM

## 2023-03-01 NOTE — PROGRESS NOTES
Blanca Hutchinson. (: 1955) is a 79 y.o. Back Pain       Patient Name: Blanca Hutchinson. Date:3/1/2023  : 1955  [x]  Patient  Verified  Payor: Kiley Zuniga / Plan: [place] MEDICARE PART A & B / Product Type: Medicare /    Bonnie Steve MD  Total Treatment Time (min): 45  Total Timed Codes (min): 45  1:1 Treatment Time ( [address] only): 39  Visit #: 10 of 20      Treatment Area: Lumbar    ASSESSMENT/PLAN:  Below is the assessment and plan developed based on review of pertinent history, physical exam, labs, studies, and medications. Making progress with core and lumbar strengthening each session. Continues to demonstrate an ataxic gait with hip drop in left and short step length on right. We will update HEP next session for focus on improved gait. Cont with current poc and progress toward ltgs. 1. Ataxic gait  2. Spinal stenosis of lumbar region, unspecified whether neurogenic claudication present      Return in about 1 day (around 3/2/2023) for continue thearpy for back . SUBJECTIVE:  HPI     Continues to struggle with SL HR. Making progress with walking endurance. Continues to feel unbalanced at times due to atrophy in right leg. Reports some radicular symptoms in his right calf, facilitations that are painful at times. OBJECTIVE:    Function/ Strength: left hip drop, short step length right, and tight right hip flexor. Manual (10 minutes)  XFM to scar tissue with patient in prone. Lumbar STM bilaterally. Grade 2-3 PA mobs to mid thoracic with pt in prone. Modalities:   Ice     Exercise(mins 35)  Today's exercises include TM SL-DL, resisted abduction walk, single-leg balance, straight leg raise, cup walk, posterior pelvic tilt, walk, piriformis stretch, marching and core pulldown,SL bridging, calf stretch, hip ext,  hip hikes,  and hamstring stretch, lumbar flexion/ ext, gait practice.     --Co-treated by BEV Yates

## 2023-03-06 ENCOUNTER — OFFICE VISIT (OUTPATIENT)
Dept: ORTHOPEDIC SURGERY | Age: 68
End: 2023-03-06
Payer: MEDICARE

## 2023-03-06 DIAGNOSIS — R26.0 ATAXIC GAIT: Primary | ICD-10-CM

## 2023-03-06 DIAGNOSIS — G89.29 CHRONIC BILATERAL LOW BACK PAIN WITHOUT SCIATICA: ICD-10-CM

## 2023-03-06 DIAGNOSIS — M54.50 CHRONIC BILATERAL LOW BACK PAIN WITHOUT SCIATICA: ICD-10-CM

## 2023-03-06 DIAGNOSIS — Z98.890 S/P LUMBAR LAMINECTOMY: ICD-10-CM

## 2023-03-06 PROCEDURE — 97140 MANUAL THERAPY 1/> REGIONS: CPT

## 2023-03-06 PROCEDURE — 97110 THERAPEUTIC EXERCISES: CPT

## 2023-03-06 NOTE — PROGRESS NOTES
Son Menchaca. (: 1955) is a 79 y.o. No chief complaint on file. Patient Name: Son Menchaca. Date:3/6/2023  : 1955  [x]  Patient  Verified  Payor: Katya Watson / Plan: [place] MEDICARE PART A & B / Product Type: Medicare /    Virginia Bey MD  Total Treatment Time (min): 45  Total Timed Codes (min): 45  1:1 Treatment Time ( [address] only): 39  Visit #: 10 of 20      Treatment Area: Lumbar    ASSESSMENT/PLAN:  Below is the assessment and plan developed based on review of pertinent history, physical exam, labs, studies, and medications. Lumbar and core stability has improved. Today we focused on pelvic stability to decrease trendelenburg gait, he did well with this. Updated HEP in order to reflect what we worked on today. Cont with current poc and progress toward ltgs. 1. Ataxic gait  2. S/P lumbar laminectomy  3. Chronic bilateral low back pain without sciatica      Return in about 2 days (around 3/8/2023) for continue therapy for back . SUBJECTIVE:  HPI     Continues to struggle with SL HR. Continues to feel unbalanced at times due to atrophy in LEFT ( amended from last note) leg. Stated that he has better left sided stability with marching. OBJECTIVE:    Function/ Strength: left hip drop, short step length right, and tight right hip flexor. Manual (15 minutes)  Lumbar STM bilaterally. Grade 2-3 PA mobs to lumbar and mid thoracic spine with pt in prone. Sacral float. Active piriformis release left in prone.       Modalities:   Declined      Exercise(mins 30)  Today's exercises include TM , Gluteus medius ext, hip hikes, slant, hip flexor stretch, shuttle SL Shuttle piriformis, bird dogs reciprocal arms, open books ([place]).    --Co-treated by BEV Ledesma

## 2023-03-08 ENCOUNTER — OFFICE VISIT (OUTPATIENT)
Dept: ORTHOPEDIC SURGERY | Age: 68
End: 2023-03-08

## 2023-03-08 DIAGNOSIS — R26.0 ATAXIC GAIT: Primary | ICD-10-CM

## 2023-03-08 DIAGNOSIS — Z98.890 S/P LUMBAR LAMINECTOMY: ICD-10-CM

## 2023-03-08 NOTE — PROGRESS NOTES
Tres Morton. (: 1955) is a 79 y.o. Back Pain       Patient Name: Tres Morton. Date:3/8/2023  : 1955  [x]  Patient  Verified  Payor: Meaghan Bridges / Plan: [place] MEDICARE PART A & B / Product Type: Medicare /    Flor Sanchez MD  Total Treatment Time (min): 45  Total Timed Codes (min): 45  1:1 Treatment Time ( [address] only): 39  Visit #: 10 of 20      Treatment Area: Lumbar    ASSESSMENT/PLAN:  Below is the assessment and plan developed based on review of pertinent history, physical exam, labs, studies, and medications. Making improvement with symmetrical gait. Continues to have calf weakness on left. Cont with current poc and progress toward ltgs. 1. Ataxic gait  2. S/P lumbar laminectomy      Return in about 1 day (around 3/9/2023) for Continue therapy for back. SUBJECTIVE:  HPI     Back is doing well. Continues to have fasciculations in left leg and decreased calf strength. OBJECTIVE:    Function/ Strength: left hip drop, short step length right, and tight right hip flexor. Manual (15 minutes)  Lumbar STM bilaterally. Grade 2-3 PA mobs to lumbar and mid thoracic spine with pt in prone. Sacral float. Active piriformis release left in prone. Modalities:   Declined      Exercise(mins 30)  Today's exercises include TM , Gluteus medius ext, hip hikes, slant, hip flexor stretch, shuttle SL Shuttle piriformis, bird dogs reciprocal arms, open books .     --Co-treated by BEV Gant

## 2023-03-13 ENCOUNTER — OFFICE VISIT (OUTPATIENT)
Dept: ORTHOPEDIC SURGERY | Age: 68
End: 2023-03-13
Payer: MEDICARE

## 2023-03-13 DIAGNOSIS — R26.0 ATAXIC GAIT: Primary | ICD-10-CM

## 2023-03-13 DIAGNOSIS — Z98.890 S/P LUMBAR LAMINECTOMY: ICD-10-CM

## 2023-03-13 PROCEDURE — 97110 THERAPEUTIC EXERCISES: CPT

## 2023-03-13 PROCEDURE — 97140 MANUAL THERAPY 1/> REGIONS: CPT

## 2023-03-13 NOTE — PROGRESS NOTES
Erin Dave (: 1955) is a 79 y.o. Back Pain       Patient Name: Erin Dave Date:3/13/2023  : 1955  [x]  Patient  Verified  Payor: Khushi Backbone / Plan: [place] MEDICARE PART A & B / Product Type: Medicare /    Anahi Garza MD  Total Treatment Time (min): 45  Total Timed Codes (min): 45  1:1 Treatment Time ( [address] only): 45  Visit #: 11 of 20      Treatment Area: Lumbar    ASSESSMENT/PLAN:  Below is the assessment and plan developed based on review of pertinent history, physical exam, labs, studies, and medications. Patient comes to clinic today with slight increase in pain. Some relief with lumbar manual traction, we will repeat again if indicated. Continues to have decreased calf strength on left and trendelenburg gait pattern. Cont with current poc and progress toward ltgs. 1. Ataxic gait  2. S/P lumbar laminectomy      Return in about 1 day (around 3/14/2023) for continue therapy for back pain. SUBJECTIVE:  HPI     Some back soreness over the weekend. OBJECTIVE:    Function/ Strength: left hip drop, short step length right, and tight right hip flexor. Manual (15 minutes)  Lumbar STM bilaterally. Grade 2-3 PA mobs to lumbar and mid thoracic spine with pt in prone. Sacral float. Active piriformis release left in prone. Lumbar traction in supine with band. Modalities:   Declined      Exercise(mins 30)  Today's exercises include TM , Gluteus medius ext, hip hikes, slant, hip flexor stretch, shuttle SL Shuttle piriformis, bird dogs reciprocal arms, open books .     --Co-treated by BEV Moore

## 2023-03-14 ENCOUNTER — OFFICE VISIT (OUTPATIENT)
Dept: ORTHOPEDIC SURGERY | Age: 68
End: 2023-03-14

## 2023-03-14 VITALS — WEIGHT: 170 LBS | HEIGHT: 72 IN | BODY MASS INDEX: 23.03 KG/M2

## 2023-03-14 DIAGNOSIS — Z98.890 S/P LUMBAR LAMINECTOMY: Primary | ICD-10-CM

## 2023-03-14 NOTE — PROGRESS NOTES
Tiff Moreno (: 1955) is a 79 y.o. male patient here for evaluation of the following chief complaint(s):  Surgical Follow-up (Sx 22 L2/S1 Laminectomy (PO#3))         ASSESSMENT/PLAN:  Below is the assessment and plan developed based on review of pertinent history, physical exam, labs, studies, and medications. 1. S/P lumbar laminectomy  -     REFERRAL TO PHYSICAL THERAPY; Future      Mr. Carmenza Estrada continues to recover well overall. He continues to see improvement with his lower leg strength. He does have an upcoming evaluation by neuromuscular specialist next month for his persistent lower extremity fasciculations, which is certainly reasonable. We will hold off on repeat EMG testing at this time, in anticipation that this physician will likely order these as well. If not, we will consider repeating EMGs at his next follow-up visit. I would like for him to continue with outpatient physical therapy. He will continue to increase activities. His gabapentin prescription has been prescribed by his primary care provider, and he may consider increasing his dose to 600 mg at bedtime. We will see him back for a follow-up visit in 2 months. Return in about 2 months (around 2023) for PT follow up. SUBJECTIVE/OBJECTIVE:  Tiff Moreno (: 1955) is a 79 y.o. male who presents today for the following:  Chief Complaint   Patient presents with    Surgical Follow-up     Sx 22 L2/S1 Laminectomy (PO#3)        Surgical Follow-up     Mr. Carmenza Estrada presents today for routine postoperative follow-up visit. He is nearly 3 months out from his recent lumbar laminectomy. He has been working diligently with outpatient physical therapy since his last office visit. He feels that his back is significantly improved. His leg strength, particularly in the left calf does continue to improve. He does however have unchanged right greater than left leg fasciculations.   He states that gabapentin at bedtime has provided some relief. He notes that he has an upcoming evaluation by neuromuscular physician next month. His primary care provider has taken over his gabapentin prescriptions. IMAGING:  XR Results (most recent):  Results from Appointment encounter on 12/29/22    XR SPINE LUMB 2 OR 3 V    Narrative  AP and lateral views of the lumbar spine reveal multilevel degenerative changes, most significant at L4-5 and L5-S1. Stable L2-S1 lumbar laminectomy. MRI Results (most recent): Allergies   Allergen Reactions    Ciprofloxacin Myalgia    Codeine Other (comments)     Since allergy to hydrocodone shouldn't take codeine    Hydrocodone Shortness of Breath    Sulfa (Sulfonamide Antibiotics) Myalgia       Current Outpatient Medications   Medication Sig    gabapentin (Neurontin) 300 mg capsule Take 1 Capsule by mouth nightly. Max Daily Amount: 300 mg.    metoprolol succinate (TOPROL-XL) 25 mg XL tablet TAKE 1 TABLET DAILY    Eliquis 5 mg tablet TAKE 1 TABLET TWICE A DAY    olmesartan (BENICAR) 40 mg tablet TAKE 1 TABLET DAILY    tamsulosin (FLOMAX) 0.4 mg capsule Take 0.4 mg by mouth as needed. cholecalciferol, vitamin D3, 50 mcg (2,000 unit) tab Take  by mouth.    cinnamon bark 500 mg cap Take  by mouth. zinc 50 mg tab tablet Take  by mouth daily. selenium 200 mcg cap Take  by mouth. glucosamine/chondr becerril A sod (glucosamine-chondroitin) 750-600 mg tab Take  by mouth two (2) times a day. fish oil-omega-3 fatty acids 340-1,000 mg capsule Take 1 Capsule by mouth daily. cyanocobalamin 1,000 mcg tablet Take 1,000 mcg by mouth daily. calcium carbonate (OS-ANTHONY) 500 mg calcium (1,250 mg) tablet Take  by mouth daily. docusate sodium (COLACE) 100 mg capsule Take 100 mg by mouth two (2) times a day. [place] brand    atorvastatin (LIPITOR) 10 mg tablet TAKE 1 TABLET DAILY    clobetasoL (TEMOVATE) 0.05 % ointment Apply 1 Bottle to affected area as needed.     dutasteride (AVODART) 0.5 mg capsule Take 0.5 mg by mouth daily. No current facility-administered medications for this visit. Past Medical History:   Diagnosis Date    Acoustic neuroma ([facility] [address] 2012    R  Marlon/ns    Arthritis     Colonic polyps 2006    Osorio/gi    Diverticulosis 2006    Encounter for screening colonoscopy 07/11/2018    Dr. Coreen Kline - repeat in 3 years    HTN, goal below 150/90     Hypertrophy of prostate without urinary obstruction and other lower urinary tract symptoms (LUTS)     Dorado/uro    IBS (irritable bowel syndrome)     Lumbar stenosis     severe  Van/os    Other and unspecified hyperlipidemia 07/2011    Paroxysmal atrial fibrillation (HCC)     ASA for anticoagulation. Beta blocker. Unstable angina ([facility] [address]     negative stress test 10/2016        Past Surgical History:   Procedure Laterality Date    BIOPSY PROSTATE  2006    COLONOSCOPY N/A 07/15/2021    . performed by Jeanette Freeman MD at [facility] ENDOSCOPY    HX CHOLECYSTECTOMY  2004    HX HERNIA REPAIR  1996    [facility] UNLISTED NEUROLOGICAL/NEUROMUSCULAR DX [address] [facility] [address] [facility] [address]  2012    GAMMA KNIFE RADIO SURGERY       Family History   Problem Relation Age of Onset    Diabetes Mother     Hypertension Mother     Cancer Mother         esophageal    Diabetes Father     Hypertension Father     Cancer Father     Diabetes Brother     Hypertension Brother     Stroke Maternal Grandfather     Anesth Problems Neg Hx         Social History     Tobacco Use    Smoking status: Never    Smokeless tobacco: Never   Substance Use Topics    Alcohol use: Yes     Alcohol/week: 2.0 standard drinks     Types: 2 Glasses of wine per week     Comment: occasional        Review of Systems   Constitutional: Negative. Respiratory: Negative. Cardiovascular: Negative. Gastrointestinal: Negative. Endocrine: Negative. Genitourinary: Negative. Musculoskeletal:  Positive for back pain. Skin: Negative. Allergic/Immunologic: Negative. Neurological:  Positive for weakness.    Hematological: Negative. Psychiatric/Behavioral: Negative. All other systems reviewed and are negative. No flowsheet data found. Vitals:  Ht 5' 11.5\" (1.816 m)   Wt 170 lb (77.1 kg)   BMI 23.38 kg/m²    Body mass index is 23.38 kg/m². Physical Exam    Integumentary  Assessment of Surgical Incision - healing and consistent with normal anticipated wound healing. Neurologic  Sensory  Light Touch - Intact - Globally. Overall Assessment of Muscle Strength and Tone reveals  Lower Extremities - Right Iliopsoas - 5/5. Left Iliopsoas - 5/5. Right Tibialis Anterior - 5/5. Left Tibialis Anterior - 5/5. Right Gastroc-Soleus - 5/5. Left Gastroc-Soleus - 5/5. Right EHL - 5/5. Left EHL - 5/5. General Assessment of Reflexes  Right Ankle - Clonus is not present. Left Ankle - Clonus is not present. Reflexes (Dermatomes)  2/2 Normal - Left Achilles (L5-S2), Left Knee (L2-4), Right Achilles (L5-S2) and Right Knee (L2-4). Musculoskeletal  Global Assessment  Examination of related systems reveals - well-developed, well-nourished, in no acute distress, alert and oriented x 3 and normal coordination. Spine/Ribs/Pelvis  Lumbosacral Spine - Examination of the lumbosacral spine reveals - no known fractures or deformities. Inspection and Palpation - Tenderness - moderate. Assessment of pain reveals the following findings - The pain is characterized as - moderate. Location - pain refers to lower back bilaterally. Dr. Mary Lou Mantilla was available for immediate consult during this encounter. An electronic signature was used to authenticate this note.   -- FINN Perdomo

## 2023-03-15 ENCOUNTER — OFFICE VISIT (OUTPATIENT)
Dept: ORTHOPEDIC SURGERY | Age: 68
End: 2023-03-15

## 2023-03-15 DIAGNOSIS — Z98.890 S/P LUMBAR LAMINECTOMY: Primary | ICD-10-CM

## 2023-03-15 DIAGNOSIS — R26.0 ATAXIC GAIT: ICD-10-CM

## 2023-03-22 ENCOUNTER — OFFICE VISIT (OUTPATIENT)
Dept: ORTHOPEDIC SURGERY | Age: 68
End: 2023-03-22

## 2023-03-22 DIAGNOSIS — Z98.890 S/P LUMBAR LAMINECTOMY: Primary | ICD-10-CM

## 2023-03-22 DIAGNOSIS — R26.0 ATAXIC GAIT: ICD-10-CM

## 2023-03-22 NOTE — PROGRESS NOTES
Nathaly Payan (: 1955) is a 76 y.o. Back Pain       Patient Name: Nathaly Smalls. Date:3/22/2023  : 1955  [x]  Patient  Verified  Payor: Coleman Ni / Plan: [place] / Product Type: Medicare /    Ryne Olivera MD  Total Treatment Time (min): 45  Total Timed Codes (min): 45  1:1 Treatment Time ( [address] only): 39  Visit #: 15 of 20      Treatment Area: Lumbar    ASSESSMENT/PLAN:  Below is the assessment and plan developed based on review of pertinent history, physical exam, labs, studies, and medications. Patient core and lumbar strength is improving each session tolerating bird dogs now with reciprocal UE and LE vs single extremity. Continues to have decreased left lower extremity strength. Cont with current poc and progress toward ltgs. 1. S/P lumbar laminectomy  2. Ataxic gait      Return in about 6 days (around 3/28/2023) for Continue therapy for back . SUBJECTIVE:  HPI     Patient stated that his back is stiff in the mornings. He was able to drive to [employer] and participate in [employer] basketball games with decreased back pain at the end of the day. OBJECTIVE:    Function/ Strength: attempted SL heel lifts and mid foot walking - unable due to leg giving out. Slight increase in back pain with eccentric HR    Anthropometric measurements:   Calf girth - R 33.5 cm, L 29.5 cm     Manual (15 minutes)  Lumbar STM bilaterally. Grade 2-3 PA mobs to lumbar and mid thoracic spine with pt in prone. Sacral float. Active piriformis release left in prone. Long axis traction. Modalities:   Declined      Exercise(mins 30)  Today's exercises include TM , Gluteus medius ext, hip hikes, slant, hip flexor stretch, shuttle SL Shuttle piriformis, bird dogs reciprocal arms, open books, rows.     --Co-treated by BEV Quigley

## 2023-03-24 ENCOUNTER — OFFICE VISIT (OUTPATIENT)
Dept: ORTHOPEDIC SURGERY | Age: 68
End: 2023-03-24

## 2023-03-24 DIAGNOSIS — R26.0 ATAXIC GAIT: ICD-10-CM

## 2023-03-24 DIAGNOSIS — Z98.890 S/P LUMBAR LAMINECTOMY: Primary | ICD-10-CM

## 2023-03-24 NOTE — PROGRESS NOTES
Bautista De. (: 1955) is a 76 y.o. Back Pain       Patient Name: Bautista De. Date:3/24/2023  : 1955  [x]  Patient  Verified  Payor: Jose Eduardo Guerita / Plan: [place] MED[place]ARE PART [place] & B / Product Type: Medicare /    Marly Rudd MD  Total Treatment Time (min): 50  Total Timed Codes (min): 50  1:1 Treatment Time ( [address] only): 39  Visit #: 16 of 20      Treatment Area: Lumbar    ASSESSMENT/PLAN:  Below is the assessment and plan developed based on review of pertinent history, physical exam, labs, studies, and medications. Overall patient is doing well with better core and paraspinal strength to decrease ataxic gait. Continues to have decreased left lower extremity strength unable to perform SL HR in full weight bearing. We will see him 1x a week for the next 2 weeks until he meets with [facility] doctor for fasciculations down LLE. At this time we will discharge to a home program to improve LE strength on left independently. 1. S/P lumbar laminectomy  2. Ataxic gait      Return in about 4 days (around 3/28/2023) for continue therapy for back . SUBJECTIVE:  HPI     Patient stated that his back is stiff in the mornings. He was able to drive to [place] and participate in [employer] basketball games with decreased back pain at the end of the day. OBJECTIVE:    Function/ Strength: Slight increase in pain with hip hikes, we have updated his home program to S/L abduction and prone ext     Anthropometric measurements:   Calf girth - R 33.5 cm, L 29.5 cm     Manual (15 minutes)  Lumbar and paraspinal STM bilaterally. Grade 2-3 PA mobs to lumbar and mid thoracic spine with pt in prone. Sacral float. Modalities:   Declined      Exercise(mins 30)  Today's exercises include TM , prone ext,  S/L abduction, slant, hip flexor stretch, shuttle SL Shuttle piriformis, bird dogs reciprocal arms, open books resisted, rows.     --Co-treated by BEV Magdaleno

## 2023-03-29 ENCOUNTER — OFFICE VISIT (OUTPATIENT)
Dept: ORTHOPEDIC SURGERY | Age: 68
End: 2023-03-29
Payer: MEDICARE

## 2023-03-29 DIAGNOSIS — Z98.890 S/P LUMBAR LAMINECTOMY: Primary | ICD-10-CM

## 2023-03-29 DIAGNOSIS — R26.0 ATAXIC GAIT: ICD-10-CM

## 2023-03-29 PROCEDURE — 97140 MANUAL THERAPY 1/> REGIONS: CPT

## 2023-03-29 PROCEDURE — 97110 THERAPEUTIC EXERCISES: CPT

## 2023-03-29 NOTE — PROGRESS NOTES
Clayton oTm. (: 1955) is a 76 y.o. Back Pain       Patient Name: Clayton Tom. Date:3/29/2023  : 1955  [x]  Patient  Verified  Payor: Eveline Lazaro / Plan: [place] MEDICARE PART [place] & B / Product Type: Medicare /    FINN Mancuso  Total Treatment Time (min): 50  Total Timed Codes (min): 50  1:1 Treatment Time ([facility] only): 45  Visit #: 18 of 20      Treatment Area: Lumbar    ASSESSMENT/PLAN:  Below is the assessment and plan developed based on review of pertinent history, physical exam, labs, studies, and medications. Progressing with range of motion reaching 20 degrees bilaterally with side bending. Continues to have weakness in left calf with little improvement each weak strengthening. We will see him 1x a week for the next 2 weeks until he meets with [facility] doctor for fasciculations down LLE. At this time we will discharge to a home program to improve LE strength on left independently. 1. S/P lumbar laminectomy  2. Ataxic gait      Return in about 1 day (around 3/30/2023) for Continue therapy for back . SUBJECTIVE:  HPI     Stated that he is still stiff in the mornings but feeling better overall. OBJECTIVE:    Function/ Strength: Slight increase in pain with hip hikes, we have updated his home program to S/L abduction and prone ext     ROM: Thoracolumbar active  Side Bend: left 20 degrees, right 20 degrees  Forward flexion 7 cm    Anthropometric measurements:   Calf girth - R 33.5 cm, L 29.5 cm     Manual (15 minutes)  Lumbar and paraspinal STM bilaterally. Grade 2-3 PA mobs to lumbar and mid thoracic spine with pt in prone. Sacral float. Bilateral long axis traction. Modalities:   Declined      Exercise(mins 30)  Today's exercises include TM , prone ext,  S/L abduction, slant, hip flexor stretch, shuttle SL Shuttle piriformis, bird dogs reciprocal arms, open books resisted, rows.     --Co-treated by BEV Bertrand Mt

## 2023-04-03 ENCOUNTER — OFFICE VISIT (OUTPATIENT)
Dept: ORTHOPEDIC SURGERY | Age: 68
End: 2023-04-03

## 2023-04-03 DIAGNOSIS — R26.0 ATAXIC GAIT: ICD-10-CM

## 2023-04-03 DIAGNOSIS — Z98.890 S/P LUMBAR LAMINECTOMY: Primary | ICD-10-CM

## 2023-04-03 NOTE — PROGRESS NOTES
Nito Rice (: 1955) is a 76 y.o. Back Pain       Patient Name: Nito Rice KRCK:  : 1955  [x]  Patient  Verified  Payor: Anna Richardson / Plan: [place] MEDICARE PART A & B / Product Type: Medicare /    Charlette Lucero MD  Total Treatment Time (min): 45  Total Timed Codes (min): 45  1:1 Treatment Time ( [address] only): 39  Visit #:       Treatment Area: Lumbar    ASSESSMENT/PLAN:  Below is the assessment and plan developed based on review of pertinent history, physical exam, labs, studies, and medications. Tiarra Doe is doing well overall with functional strength and range of motion. He is performing yard work, bending stooping and rotating without pain. He will meet with the neurologist to get more information regarding LE strength and fasciculations down left leg. He will discharge to a home program and follow up with any changes or concerns. 1. S/P lumbar laminectomy  2. Ataxic gait      Return if symptoms worsen or fail to improve. SUBJECTIVE:  HPI     Stated that he push down mulch over the weekend, did it in small sections, and did fine with this with no pain. Reports he does still get stiff in the morning but is able to decrease this once he starts moving. No change in calf strengthen or fasciculations. OBJECTIVE:    Function/ Strength: Slight increase in pain with hip hikes, we have updated his home program to S/L abduction and prone ext     ROM: Thoracolumbar active  Side Bend: left 20 degrees, right 20 degrees  Forward flexion 7 cm    Anthropometric measurements:   Calf girth - R 33.5 cm, L 29.5 cm     Manual (15 minutes)  Lumbar and paraspinal STM bilaterally. Grade 2-3 PA mobs to lumbar and mid thoracic spine with pt in prone. Sacral float. Bilateral long axis traction.        Modalities:   Declined      Exercise(mins 30)  Today's exercises include TM , prone ext,  S/L abduction, slant, hip flexor stretch, shuttle SL HR, pigeon, resisted hip thrust, S/L abduction, Hamstring stretch bird dogs reciprocal arms, open books resisted, rows.     --Co-treated by BEV Kinney

## 2023-04-04 ENCOUNTER — OFFICE VISIT (OUTPATIENT)
Dept: NEUROLOGY | Age: 68
End: 2023-04-04
Payer: MEDICARE

## 2023-04-04 PROCEDURE — G8427 DOCREV CUR MEDS BY ELIG CLIN: HCPCS | Performed by: PSYCHIATRY & NEUROLOGY

## 2023-04-04 PROCEDURE — 3078F DIAST BP <80 MM HG: CPT | Performed by: PSYCHIATRY & NEUROLOGY

## 2023-04-04 PROCEDURE — G8536 NO DOC ELDER MAL SCRN: HCPCS | Performed by: PSYCHIATRY & NEUROLOGY

## 2023-04-04 PROCEDURE — 1101F PT FALLS ASSESS-DOCD LE1/YR: CPT | Performed by: PSYCHIATRY & NEUROLOGY

## 2023-04-04 PROCEDURE — G8510 SCR DEP NEG, NO PLAN REQD: HCPCS | Performed by: PSYCHIATRY & NEUROLOGY

## 2023-04-04 PROCEDURE — 99205 OFFICE O/P NEW HI 60 MIN: CPT | Performed by: PSYCHIATRY & NEUROLOGY

## 2023-04-04 PROCEDURE — 3017F COLORECTAL CA SCREEN DOC REV: CPT | Performed by: PSYCHIATRY & NEUROLOGY

## 2023-04-04 PROCEDURE — 3074F SYST BP LT 130 MM HG: CPT | Performed by: PSYCHIATRY & NEUROLOGY

## 2023-04-04 PROCEDURE — G8420 CALC BMI NORM PARAMETERS: HCPCS | Performed by: PSYCHIATRY & NEUROLOGY

## 2023-04-04 PROCEDURE — 1123F ACP DISCUSS/DSCN MKR DOCD: CPT | Performed by: PSYCHIATRY & NEUROLOGY

## 2023-04-04 NOTE — PROGRESS NOTES
Chief Complaint   Patient presents with    New Patient     Patient reports having left leg weakness and left calf atrophy. Patient states he has fasciculations in right lower leg where it is constant since 10/2022. Patient bought in report of EMG he had on BLE. Patient reports he had MRI of brain 1/2023 at [facility].       Visit Vitals  /74   Pulse 78   Temp 97.9 °F (36.6 °C) (Oral)   Resp 18   Ht 5' 11.5\" (1.816 m)   Wt 80.9 kg (178 lb 6.4 oz)   SpO2 97%   BMI 24.53 kg/m²

## 2023-04-04 NOTE — PROGRESS NOTES
NEUROLOGY CLINIC NOTE    Patient ID:  Aubrey Grimes  [id number]  76 y.o.  1955    Date of Consultation:  April 4, 2023    Referring Physician: Dr. Marlon Loyd    Reason for Consultation:  muscle fasciculations    Chief Complaint   Patient presents with    New Patient     Patient reports having left leg weakness and left calf atrophy. Patient states he has fasciculations in right lower leg where it is constant since 10/2022. Patient bought in report of EMG he had on BLE. History of Present Illness:     Patient Active Problem List    Diagnosis Date Noted    Lumbar spinal stenosis 12/14/2022    Paroxysmal A-fib ([address] 04/19/2017    Benign essential HTN 04/19/2017    Mixed hyperlipidemia 04/19/2017    Lumbar stenosis     History of BPH     IBS (irritable bowel syndrome)     History of acoustic neuroma 01/01/2012    Diverticulosis 01/01/2006    Colon polyp 01/01/2006     Past Medical History:   Diagnosis Date    Acoustic neuroma ([address] 2012    R  Marlon/ns    Arthritis     Colonic polyps 2006    Osorio/gi    Diverticulosis 2006    Encounter for screening colonoscopy 07/11/2018    Dr. Yamilex Bucio - repeat in 3 years    HTN, goal below 150/90     Hypertrophy of prostate without urinary obstruction and other lower urinary tract symptoms (LUTS)     Dorado/uro    IBS (irritable bowel syndrome)     Lumbar stenosis     severe  Van/os    Other and unspecified hyperlipidemia 07/2011    Paroxysmal atrial fibrillation (HCC)     ASA for anticoagulation. Beta blocker. Unstable angina ([address]     negative stress test 10/2016      Past Surgical History:   Procedure Laterality Date    BIOPSY PROSTATE  2006    COLONOSCOPY N/A 07/15/2021    . performed by Pedrito Kyle MD at [facility] ENDOSCOPY    HX CHOLECYSTECTOMY  2004    HX HERNIA REPAIR  1996    LA UNLISTED NEUROLOGICAL/NEUROMUSCULAR DX [address]  2012    GAMMA KNIFE RADIO SURGERY      Prior to Admission medications    Medication Sig Start Date End Date Taking?  Authorizing Provider gabapentin (Neurontin) 300 mg capsule Take 1 Capsule by mouth nightly. Max Daily Amount: 300 mg. 1/26/23  Yes Lazarus Felder MD   metoprolol succinate (TOPROL-XL) 25 mg XL tablet TAKE 1 TABLET DAILY 1/3/23  Yes Mayi Angulo MD   Eliquis 5 mg tablet TAKE 1 TABLET TWICE A DAY 1/3/23  Yes Mayi Angulo MD   olmesartan (BENICAR) 40 mg tablet TAKE 1 TABLET DAILY 1/3/23  Yes Mayi Angulo MD   tamsulosin [facility]) 0.4 mg capsule Take 0.4 mg by mouth as needed. Yes Provider, Historical   cholecalciferol, vitamin D3, 50 mcg (2,000 unit) tab Take  by mouth. Yes Provider, Historical   cinnamon bark 500 mg cap Take  by mouth. Yes Provider, Historical   zinc 50 mg tab tablet Take  by mouth daily. Yes Provider, Historical   selenium 200 mcg cap Take  by mouth. Yes Provider, Historical   glucosamine/chondr becerril A sod (glucosamine-chondroitin) 750-600 mg tab Take  by mouth two (2) times a day. Yes Provider, Historical   fish oil-omega-3 fatty acids 340-1,000 mg capsule Take 1 Capsule by mouth daily. Yes Provider, Historical   cyanocobalamin 1,000 mcg tablet Take 1,000 mcg by mouth daily. Yes Provider, Historical   calcium carbonate (OS-ANTHONY) 500 mg calcium (1,250 mg) tablet Take  by mouth daily. Yes Provider, Historical   docusate sodium (COLACE) 100 mg capsule Take 100 mg by mouth two (2) times a day. [place] brand   Yes Provider, Historical   atorvastatin (LIPITOR) 10 mg tablet TAKE 1 TABLET DAILY 1/8/21  Yes Chastity Hemphill MD   clobetasoL (TEMOVATE) 0.05 % ointment Apply 1 Bottle to affected area as needed. 11/6/20  Yes Provider, Historical   dutasteride (AVODART) 0.5 mg capsule Take 0.5 mg by mouth daily.    Yes Provider, Historical     Allergies   Allergen Reactions    Ciprofloxacin Myalgia    Codeine Other (comments)     Since allergy to hydrocodone shouldn't take codeine    Hydrocodone Shortness of Breath    Sulfa (Sulfonamide Antibiotics) Myalgia      Social History     Tobacco Use    Smoking status: Never    Smokeless tobacco: Never   Substance Use Topics    Alcohol use: Yes     Alcohol/week: 2.0 standard drinks     Types: 2 Glasses of wine per week     Comment: occasional      Family History   Problem Relation Age of Onset    Diabetes Mother     Hypertension Mother     Cancer Mother         esophageal    Diabetes Father     Hypertension Father     Cancer Father     Diabetes Brother     Hypertension Brother     Stroke Maternal Grandfather     Anesth Problems Neg Hx         Subjective:      Jama Lauren is a 76 y.o. RH male history of paroxysmal atrial fibrillation on anticoagulation, lumbar spondylosis with myelopathy/radiculopathy status post surgery, right vestibular schwannoma, IBS, hypertension, BPH, hyperlipidemia, diverticulosis and arthritis who was referred here by Dr. Shobha Middleton for further evaluation of his weakness and fasciculation. Per patient condition started around 2010. Clinic note then actually mentions patient complaining of lower lumbar pain precipitated by ambulating and occasional radiating pain in both legs. Patient reports seeing orthopedics. Lumbar MRI done 3/28/2011 revealed L4-5 severe central canal stenosis and severe bilateral foraminal stenosis. L5-S1 moderate to severe bilateral foraminal stenosis. Per patient he had to run conservative management including physical therapy with improvement of his symptoms. Surgery was deferred. Per patient condition exacerbated in 2020. He again was seen by orthopedics and another imaging done. Proceeded to do conservative management including physical therapy with improvement. Patient was seen again by orthopedics 11/4/2022. Note mentions increasing buttock pain and back pain with any prolonged standing or walking. Walking tolerance is decreased. Patient also developing atrophy of the left lower extremity particularly in the calf with weakness pushing off and walking.   Having some giveaway of plantarflexion of his left leg while walking. Tried physical therapy without relief. Lumbar MRI done 11/2/2022 revealed progressive severe degenerative changes at L2-3 and L3-4. Persistent severe degenerative changes L4-5. Moderate edema and endplate signal abnormalities at L2-3 favored reactive degenerative changes. L2-3 revealed severe bilateral neuroforaminal stenosis. Severe central canal stenosis. L3-4 moderate to severe canal stenosis, moderate severe left and mild to moderate right neuroforaminal stenosis. L4-5 severe central canal stenosis with moderate to severe bilateral neuroforaminal stenosis. L5-S1 revealed mild central stenosis with severe bilateral neuroforaminal stenosis. Patient underwent EMG/NCS of bilateral lower extremity 11/16/2022. Note mentions occasional fasciculations in the left calf for the past 5 years and for about 2 months noticed fasciculations of the right calf as well. Study revealed no evidence of peripheral neuropathy. Left moderate L5 and severe S1 radiculopathy, right mild to moderate S1 radiculopathy. Patient underwent lumbar laminectomy L2-S1 with partial facetectomies with decompression bilaterally of the L2 down to S1 nerve roots last 12/15/2022. Was seen by orthopedics 1/26/2023. Reports minimal back pain. Continues to have fasciculations in the right upper calf region. No increase in atrophy noted. Patient continues to undergo physical therapy. Patient reports persistent residual atrophy of the left calf muscle and difficulty toe walking on the left. He still has episodes of left and right calf fasciculations.     Notes episodes of fasciculations right upper arm and occasionally twitching in the back   Denies any loss of muscle bulk or weakness in the upper extremity  No persistent issues with swallowing    Brain MRI with and without contrast done 3/24/2012 for sensorineural hearing loss revealed a homogenously enhancing mass measuring 8.2 x 12.6 x 5.6 mm occupying and widening the right internal auditory canal.  Findings consistent with a right vestibular schwannoma. Patient was then referred and was followed by Dr. Jessica Whitt and was last seen 1/17/2023. Last brain MRI done 1/11/2023 revealed stable presumed right vestibular schwannoma. Measuring 10 x 5 x 3 mm within the right internal auditory canal.  No evidence of labyrinthine extension. Outside reports reviewed: office notes, operative report, radiology reports. Review of Systems:    A comprehensive review of systems was performed:   Constitutional: positive for none  Eyes: positive for none  Ears, nose, mouth, throat, and face: positive for hearing issues  Respiratory: positive for none  Cardiovascular: positive for high blood pressure  Gastrointestinal: positive for none  Genitourinary: positive for none  Integument/breast: positive for none  Hematologic/lymphatic: positive for none  Musculoskeletal: positive for joint pain, muscle weakness  Neurological: positive for none  Behavioral/Psych: positive for anxiety  Endocrine: positive for none  Allergic/Immunologic: positive for none    Objective:     Visit Vitals  /74   Pulse 78   Temp 97.9 °F (36.6 °C) (Oral)   Resp 18   Ht 5' 11.5\" (1.816 m)   Wt 80.9 kg (178 lb 6.4 oz)   SpO2 97%   BMI 24.53 kg/m²     PHYSICAL EXAM:    NEUROLOGICAL EXAM:    Appearance: The patient is well developed, well nourished, provides a coherent history and is in no acute distress. Mental Status: Oriented to time, place and person. Fluent, no aphasia or dysarthria. Good recent and remote memory. Good attention and concentration. Able to name objects. Good repetition. Adequate fund of knowledge. Mood and affect appropriate. Cranial Nerves:   Intact visual fields. SILVESTRE, EOM's full, no nystagmus, no ptosis. Facial sensation is normal. Corneal reflexes are intact. Facial movement is symmetric. Hearing is normal bilaterally. Palate is midline with normal elevation. Sternocleidomastoid and trapezius muscles are normal. Tongue is midline. Motor:  5/5 strength except left gastrocnemius muscle weakness and atrophy and feet intrinsic weakness and atrophy. No tone changes. No obvious fasciculations. No pronator drift. Reflexes:   Deep tendon reflexes 1+/4 and symmetrical. Downgoing toes. Sensory:   Normal to cold, pinprick and vibration. Gait:  Slightly favors left leg. No Romberg. Can do tandem walking. Problem with toe walking left but no problems with heel walking. Tremor:   No tremor noted. Cerebellar:  Intact FTN/SANDHYA/HTS. Neurovascular:  Normal heart sounds and regular rhythm, peripheral pulses intact, and no carotid bruits. Assessment:   Muscle fasciculation  Lumbar spondylosis with myelopathy/radiculopathy  Right vestibular schwannoma    Plan:   Neurological examination reveals left gastrocnemius muscle atrophy and feet intrinsic muscle atrophy with weakness. Patient is globally hyporeflexic with no tone changes. No evidence on exam of a combination of upper and lower motor neuron changes. Given that patient is experiencing some episodes of right upper extremity fasciculation as well as paraspinal twitchings, EMG/NCS of bilateral upper extremity was ordered to further assess. Further intervention be done pending results of testing. Patient with significant history of multilevel lumbar spinal stenosis and neuroforaminal stenosis with an EMG/NCS confirming damage from radiculopathy. Explained to the patient that fasciculation can be seen in significant radiculopathy. Lower extremity study was not consistent with a motor neuron disease. Continue physical therapy. May benefit from aquatic therapy to maximize strengthening. Issues with feet numbness can be residual from the radiculopathy. Fall precautions. Prior brain MRIs reveal a right internal auditory canal tumor consistent with a right vestibular schwannoma or acoustic neuroma.   No change in size with MRI monitoring. Patient was previously followed by neurosurgery and recently discharge with no follow-up. Currently not contributing to patient's issues. All questions and concerns were answered. Visit lasted 70 minutes. Greater than 50% was spent reviewing available medical records as summarized above, discussion about his condition, etiology, prognosis, EMG/NCS of the upper extremity to assess issues with fasciculation and possible cervical radiculopathy, reassurance in relation to the findings in the lower extremity EMG and that the issue with the legs are merely residual from the lumbar radiculopathy, stable right acoustic neuroma, continue physical therapy    This note was created using voice recognition software. Despite editing, there may be syntax errors.

## 2023-04-04 NOTE — LETTER
4/5/2023    Patient: Jama Casanova. YOB: 1955   Date of Visit: 4/4/2023     Ji Rodrigues MD  [address]  [facility] [address]  Via Fax: [address], [place]  [address] [place] [address],[address]  [address] [place] [address]  Via In [place]    Dear MD Chon Ceja MD,      Thank you for referring Mr. Daniela Urrutia to [facility] for evaluation. My notes for this consultation are attached. If you have questions, please do not hesitate to call me. I look forward to following your patient along with you.       Sincerely,    Milena Suarez MD

## 2023-05-02 ENCOUNTER — OFFICE VISIT (OUTPATIENT)
Dept: ORTHOPEDIC SURGERY | Age: 68
End: 2023-05-02

## 2023-05-02 DIAGNOSIS — Z98.890 S/P LUMBAR LAMINECTOMY: Primary | ICD-10-CM

## 2023-06-07 ENCOUNTER — OFFICE VISIT (OUTPATIENT)
Age: 68
End: 2023-06-07
Payer: MEDICARE

## 2023-06-07 VITALS
BODY MASS INDEX: 25.26 KG/M2 | OXYGEN SATURATION: 95 % | HEIGHT: 71 IN | SYSTOLIC BLOOD PRESSURE: 118 MMHG | DIASTOLIC BLOOD PRESSURE: 82 MMHG | WEIGHT: 180.4 LBS | HEART RATE: 62 BPM

## 2023-06-07 DIAGNOSIS — E78.2 MIXED HYPERLIPIDEMIA: ICD-10-CM

## 2023-06-07 DIAGNOSIS — I10 ESSENTIAL (PRIMARY) HYPERTENSION: ICD-10-CM

## 2023-06-07 DIAGNOSIS — Z79.01 LONG TERM (CURRENT) USE OF ANTICOAGULANTS: ICD-10-CM

## 2023-06-07 DIAGNOSIS — I48.0 PAROXYSMAL ATRIAL FIBRILLATION (HCC): Primary | ICD-10-CM

## 2023-06-07 PROCEDURE — 99213 OFFICE O/P EST LOW 20 MIN: CPT | Performed by: INTERNAL MEDICINE

## 2023-06-07 PROCEDURE — G8427 DOCREV CUR MEDS BY ELIG CLIN: HCPCS | Performed by: INTERNAL MEDICINE

## 2023-06-07 PROCEDURE — 1123F ACP DISCUSS/DSCN MKR DOCD: CPT | Performed by: INTERNAL MEDICINE

## 2023-06-07 PROCEDURE — 3017F COLORECTAL CA SCREEN DOC REV: CPT | Performed by: INTERNAL MEDICINE

## 2023-06-07 PROCEDURE — 3079F DIAST BP 80-89 MM HG: CPT | Performed by: INTERNAL MEDICINE

## 2023-06-07 PROCEDURE — G8419 CALC BMI OUT NRM PARAM NOF/U: HCPCS | Performed by: INTERNAL MEDICINE

## 2023-06-07 PROCEDURE — 1036F TOBACCO NON-USER: CPT | Performed by: INTERNAL MEDICINE

## 2023-06-07 PROCEDURE — 3074F SYST BP LT 130 MM HG: CPT | Performed by: INTERNAL MEDICINE

## 2023-06-07 ASSESSMENT — PATIENT HEALTH QUESTIONNAIRE - PHQ9
2. FEELING DOWN, DEPRESSED OR HOPELESS: 0
SUM OF ALL RESPONSES TO PHQ QUESTIONS 1-9: 0
SUM OF ALL RESPONSES TO PHQ9 QUESTIONS 1 & 2: 0
1. LITTLE INTEREST OR PLEASURE IN DOING THINGS: 0
SUM OF ALL RESPONSES TO PHQ QUESTIONS 1-9: 0

## 2023-06-07 NOTE — PROGRESS NOTES
appearance - alert, well appearing, and in no distress  Mental status - affect appropriate to mood  Eyes - sclera anicteric, moist mucous membranes  Neck - supple, no JVD  Chest - clear to auscultation, no wheezes, rales or rhonchi  Heart - normal rate, regular rhythm, normal S1, S2, no murmurs, rubs, clicks or gallops  Abdomen - soft, nontender, nondistended, no masses or organomegaly  Back exam - full range of motion, no tenderness  Neurological - no focal deficits  Extremities - peripheral pulses normal, no pedal edema      Recent Labs:  Lab Results   Component Value Date/Time    CHOL 146 09/16/2020 07:54 AM    HDL 54 09/16/2020 07:54 AM     No results found for: LEO, CREAPOC, CREA  Lab Results   Component Value Date/Time    BUN 13 12/15/2022 02:46 AM     Lab Results   Component Value Date/Time    K 4.2 12/15/2022 02:46 AM     No results found for: HBA1C  Lab Results   Component Value Date/Time    HGB 13.8 12/16/2022 01:04 AM     Lab Results   Component Value Date/Time     11/04/2020 01:57 PM       Reviewed:  Past Medical History:   Diagnosis Date    Acoustic neuroma ([facility] [address] 2012    R  Juanis/ns    Arthritis     Diverticulosis 2006    Encounter for screening colonoscopy 07/11/2018    Dr. Isabel Lobo - repeat in 3 years    HTN, goal below 150/90     Hypertrophy of prostate without urinary obstruction and other lower urinary tract symptoms (LUTS)     Felix/uro    IBS (irritable bowel syndrome)     Lumbar stenosis     severe  Van/os    Other and unspecified hyperlipidemia 07/2011    Paroxysmal atrial fibrillation (HCC)     ASA for anticoagulation. Beta blocker.     Unstable angina (HCC)     negative stress test 10/2016     Social History     Tobacco Use   Smoking Status Never   Smokeless Tobacco Never     Social History     Substance and Sexual Activity   Alcohol Use Yes    Alcohol/week: 2.0 standard drinks     Allergies   Allergen Reactions    Hydrocodone Shortness Of Breath    Ciprofloxacin Myalgia    Codeine

## 2023-07-22 ENCOUNTER — APPOINTMENT (OUTPATIENT)
Facility: HOSPITAL | Age: 68
End: 2023-07-22
Payer: MEDICARE

## 2023-07-22 ENCOUNTER — HOSPITAL ENCOUNTER (EMERGENCY)
Facility: HOSPITAL | Age: 68
Discharge: HOME | End: 2023-07-22
Attending: EMERGENCY MEDICINE
Payer: MEDICARE

## 2023-07-22 VITALS
DIASTOLIC BLOOD PRESSURE: 81 MMHG | OXYGEN SATURATION: 95 % | HEIGHT: 71 IN | SYSTOLIC BLOOD PRESSURE: 131 MMHG | WEIGHT: 179.45 LBS | BODY MASS INDEX: 25.12 KG/M2 | TEMPERATURE: 97.5 F | RESPIRATION RATE: 16 BRPM | HEART RATE: 72 BPM

## 2023-07-22 DIAGNOSIS — S09.90XA INJURY OF HEAD, INITIAL ENCOUNTER: Primary | ICD-10-CM

## 2023-07-22 PROCEDURE — 70450 CT HEAD/BRAIN W/O DYE: CPT

## 2023-07-22 PROCEDURE — 99284 EMERGENCY DEPT VISIT MOD MDM: CPT

## 2023-07-22 ASSESSMENT — LIFESTYLE VARIABLES
HOW OFTEN DO YOU HAVE A DRINK CONTAINING ALCOHOL: NEVER
HOW MANY STANDARD DRINKS CONTAINING ALCOHOL DO YOU HAVE ON A TYPICAL DAY: PATIENT DOES NOT DRINK

## 2023-07-22 NOTE — ED NOTES
Pt ambulatory out of ED with discharge instructions and prescriptions in hand given by Dr. Rhonda Rey; pt verbalized understanding of discharge paperwork and time allotted for questions. VSS. Pt alert and oriented.         Tonie Beltran RN  07/22/23 [id number]

## 2023-07-22 NOTE — ED TRIAGE NOTES
Patient arrives with c/o hitting top of the head while in his crawl space with wooden frame on Wednesday. Tetanus up to day. Denies LOC. Denies neck pain, nausea, vomiting, blurry vision, dizziness. Reports headache (on eliquis for afib 2016). Took tylenol and has had some relief.

## 2023-07-22 NOTE — ED PROVIDER NOTES
[facility] [address] ENCOUNTER      Patient Name: Joel Jain  MRN: [id number]  [address] [facility] 1955  Date of Evaluation: 7/22/2023  Physician: Treva Scales MD    CHIEF COMPLAINT       Chief Complaint   Patient presents with    Head Injury    Headache       HISTORY OF PRESENT ILLNESS   (Location/Symptom, Timing/Onset, Context/Setting, Quality, Duration, Modifying Factors, Severity)   Antonio Lenz, 76 y.o., male     43-year-old male with a history of atrial fibrillation on Eliquis presents with a chief complaint of headache and \"fogginess\". Patient hit his head while in a crawl space on Wednesday. He has had symptoms since that time. Nursing Notes were reviewed. REVIEW OF SYSTEMS    (Not required)   Review of Systems   Neurological:  Positive for headaches. PAST MEDICAL HISTORY     Past Medical History:   Diagnosis Date    Acoustic neuroma ([address]) 2012    R  Juanis/ns    Arthritis     Diverticulosis 2006    Encounter for screening colonoscopy 07/11/2018    Dr. Payton Life - repeat in 3 years    HTN, goal below 150/90     Hypertrophy of prostate without urinary obstruction and other lower urinary tract symptoms (LUTS)     Felix/uro    IBS (irritable bowel syndrome)     Lumbar stenosis     severe  Van/os    Other and unspecified hyperlipidemia 07/2011    Paroxysmal atrial fibrillation (HCC)     ASA for anticoagulation. Beta blocker. Unstable angina ([address])     negative stress test 10/2016       SURGICAL HISTORY       Past Surgical History:   Procedure Laterality Date    BIOPSY PROSTATE  2006    CHOLECYSTECTOMY  2004    COLONOSCOPY N/A 07/15/2021    .  performed by Anita Arevalo MD at [address] [place] [address]  2012    GAMMA KNIFE RADIO SURGERY       CURRENT MEDICATIONS       Discharge Medication List as of 7/22/2023 12:18 PM        CONTINUE these medications which have NOT CHANGED    Details   apixaban (ELIQUIS) 5 MG TABS tablet TAKE 1 TABLET TWICE A

## 2023-07-22 NOTE — DISCHARGE INSTRUCTIONS
Thank you for allowing us to provide you with medical care today. We realize that you have many choices for your emergency care needs. We thank you for choosing [facility]. Please choose us in the future for any continued health care needs. The exam and treatment you received in the Emergency Department were for an emergent problem and are not intended as complete care. It is important that you follow up with a doctor, nurse practitioner, or physician's assistant for ongoing care. If your symptoms worsen or you do not improve as expected and you are unable to reach your usual health care provider, you should return to the Emergency Department. We are available 24 hours a day. Please make an appointment with your health care provider(s) for follow up of your Emergency Department visit. Take this sheet with you when you go to your follow-up visit.

## 2023-10-04 ENCOUNTER — HOSPITAL ENCOUNTER (OUTPATIENT)
Facility: HOSPITAL | Age: 68
Discharge: HOME | End: 2023-10-07
Payer: MEDICARE

## 2023-10-04 DIAGNOSIS — K40.90 INTERNAL INGUINAL HERNIA: ICD-10-CM

## 2023-10-04 PROCEDURE — 72192 CT PELVIS W/O DYE: CPT

## 2023-11-08 ENCOUNTER — TELEPHONE (OUTPATIENT)
Age: 68
End: 2023-11-08

## 2023-11-08 NOTE — TELEPHONE ENCOUNTER
Received a request from Dr. Gunner Lucas with [facility] for cardiac clearance and direction on the patient's Eliquis prior to his robotic repair of an incarcerated left inguinal hernia and a non-recurrent right inguinal hernia. His surgery will be on December 6th under general anesthesia.

## 2023-11-08 NOTE — TELEPHONE ENCOUNTER
Faxed cardiac clearance to [facility] surgery. Confirmation fax receipt received.

## 2023-12-01 ENCOUNTER — OFFICE VISIT (OUTPATIENT)
Age: 68
End: 2023-12-01
Payer: MEDICARE

## 2023-12-01 VITALS
WEIGHT: 184.8 LBS | HEART RATE: 60 BPM | OXYGEN SATURATION: 97 % | BODY MASS INDEX: 25.87 KG/M2 | DIASTOLIC BLOOD PRESSURE: 80 MMHG | SYSTOLIC BLOOD PRESSURE: 128 MMHG | HEIGHT: 71 IN

## 2023-12-01 DIAGNOSIS — I10 ESSENTIAL (PRIMARY) HYPERTENSION: ICD-10-CM

## 2023-12-01 DIAGNOSIS — Z01.810 PREOP CARDIOVASCULAR EXAM: Primary | ICD-10-CM

## 2023-12-01 DIAGNOSIS — Z79.01 LONG TERM (CURRENT) USE OF ANTICOAGULANTS: ICD-10-CM

## 2023-12-01 DIAGNOSIS — I48.0 PAROXYSMAL ATRIAL FIBRILLATION (HCC): ICD-10-CM

## 2023-12-01 DIAGNOSIS — I10 BENIGN ESSENTIAL HTN: ICD-10-CM

## 2023-12-01 PROCEDURE — 99213 OFFICE O/P EST LOW 20 MIN: CPT | Performed by: INTERNAL MEDICINE

## 2023-12-01 RX ORDER — ESZOPICLONE 1 MG/1
TABLET, FILM COATED ORAL
COMMUNITY

## 2023-12-01 ASSESSMENT — PATIENT HEALTH QUESTIONNAIRE - PHQ9
SUM OF ALL RESPONSES TO PHQ QUESTIONS 1-9: 0
SUM OF ALL RESPONSES TO PHQ QUESTIONS 1-9: 0
SUM OF ALL RESPONSES TO PHQ9 QUESTIONS 1 & 2: 0
SUM OF ALL RESPONSES TO PHQ QUESTIONS 1-9: 0
1. LITTLE INTEREST OR PLEASURE IN DOING THINGS: 0
SUM OF ALL RESPONSES TO PHQ QUESTIONS 1-9: 0
2. FEELING DOWN, DEPRESSED OR HOPELESS: 0

## 2023-12-26 RX ORDER — METOPROLOL SUCCINATE 25 MG/1
TABLET, EXTENDED RELEASE ORAL
Qty: 90 TABLET | Refills: 0 | Status: SHIPPED | OUTPATIENT
Start: 2023-12-26

## 2023-12-26 RX ORDER — OLMESARTAN MEDOXOMIL 40 MG/1
TABLET ORAL
Qty: 60 TABLET | Refills: 0 | Status: SHIPPED | OUTPATIENT
Start: 2023-12-26

## 2023-12-26 NOTE — TELEPHONE ENCOUNTER
Refill Request Received for the Following Medication     Requested Prescriptions     Pending Prescriptions Disp Refills    olmesartan (BENICAR) 40 MG tablet [Pharmacy Med Name: OLMESARTAN MEDOXOMIL TABS 40MG] 90 tablet 3     Sig: TAKE 1 TABLET DAILY    metoprolol succinate (TOPROL XL) 25 MG extended release tablet [Pharmacy Med Name: METOPROLOL SUCCINATE ER TABS 25MG] 90 tablet 3     Sig: TAKE 1 TABLET DAILY       Last Prescribed:01-    Last Appointment With Me:  12/1/2023     Future Appointments:  Future Appointments   Date Time Provider [address] [place] [address]   6/17/2024 10:40 AM MD SCOTTIE Clark AMB

## 2024-02-06 RX ORDER — APIXABAN 5 MG/1
5 TABLET, FILM COATED ORAL 2 TIMES DAILY
Qty: 180 TABLET | Refills: 3 | Status: SHIPPED | OUTPATIENT
Start: 2024-02-06

## 2024-02-06 NOTE — TELEPHONE ENCOUNTER
Requested Prescriptions     Signed Prescriptions Disp Refills    ELIQUIS 5 MG TABS tablet 180 tablet 3     Sig: TAKE 1 TABLET TWICE A DAY     Authorizing Provider: LUIS SALDIVAR     Ordering User: BRAULIO STEWART MD    Future Appointments   Date Time Provider Department Center   6/17/2024 10:40 AM Luis Saldivar MD [facility]

## 2024-02-26 RX ORDER — OLMESARTAN MEDOXOMIL 40 MG/1
40 TABLET ORAL DAILY
Qty: 90 TABLET | Refills: 3 | Status: SHIPPED
Start: 2024-02-26

## 2024-02-26 NOTE — TELEPHONE ENCOUNTER
Requested Prescriptions     Signed Prescriptions Disp Refills    olmesartan (BENICAR) 40 MG tablet 90 tablet 3     Sig: Take 1 tablet by mouth daily     Authorizing Provider: LUIS SALDIVAR     Ordering User: BRAULIO STEWART MD    Future Appointments   Date Time Provider Department Center   6/17/2024 10:40 AM Luis Saldivar MD [facility]

## 2024-03-25 RX ORDER — METOPROLOL SUCCINATE 25 MG/1
TABLET, EXTENDED RELEASE ORAL
Qty: 90 TABLET | Refills: 3 | Status: SHIPPED | OUTPATIENT
Start: 2024-03-25

## 2024-03-25 NOTE — TELEPHONE ENCOUNTER
Requested Prescriptions     Signed Prescriptions Disp Refills    metoprolol succinate (TOPROL XL) 25 MG extended release tablet 90 tablet 3     Sig: TAKE 1 TABLET DAILY     Authorizing Provider: LUIS SALDIVAR     Ordering User: BRAULIO STEWART MD    Future Appointments   Date Time Provider Department Center   6/17/2024 10:40 AM Luis Saldivar MD [facility]

## 2024-04-29 RX ORDER — OLMESARTAN MEDOXOMIL 40 MG/1
40 TABLET ORAL DAILY
Qty: 90 TABLET | Refills: 3 | Status: SHIPPED | OUTPATIENT
Start: 2024-04-29

## 2024-04-29 NOTE — TELEPHONE ENCOUNTER
Requested Prescriptions     Signed Prescriptions Disp Refills    olmesartan (BENICAR) 40 MG tablet 90 tablet 3     Sig: Take 1 tablet by mouth daily     Authorizing Provider: LUIS SALDIVAR     Ordering User: BRAULIO STEWART MD    Future Appointments   Date Time Provider Department Center   6/11/2024  9:15 AM Annabelle Mendiola PA [facility] AM[facility]   6/17/2024 10:40 AM Luis Saldivar MD [facility]  AMB

## 2024-05-13 ENCOUNTER — TELEPHONE (OUTPATIENT)
Age: 69
End: 2024-05-13

## 2024-08-09 ENCOUNTER — OFFICE VISIT (OUTPATIENT)
Age: 69
End: 2024-08-09
Payer: MEDICARE

## 2024-08-09 VITALS
HEIGHT: 71 IN | WEIGHT: 176 LBS | HEART RATE: 69 BPM | DIASTOLIC BLOOD PRESSURE: 76 MMHG | BODY MASS INDEX: 24.64 KG/M2 | SYSTOLIC BLOOD PRESSURE: 122 MMHG | OXYGEN SATURATION: 96 %

## 2024-08-09 DIAGNOSIS — E78.2 MIXED HYPERLIPIDEMIA: ICD-10-CM

## 2024-08-09 DIAGNOSIS — Z79.01 LONG TERM (CURRENT) USE OF ANTICOAGULANTS: ICD-10-CM

## 2024-08-09 DIAGNOSIS — I48.0 PAROXYSMAL ATRIAL FIBRILLATION (HCC): Primary | ICD-10-CM

## 2024-08-09 DIAGNOSIS — I10 BENIGN ESSENTIAL HTN: ICD-10-CM

## 2024-08-09 PROCEDURE — 99214 OFFICE O/P EST MOD 30 MIN: CPT | Performed by: INTERNAL MEDICINE

## 2024-08-09 RX ORDER — VITAMIN B COMPLEX
1 CAPSULE ORAL DAILY
COMMUNITY

## 2024-08-09 NOTE — PROGRESS NOTES
[facility] [address]:   [phone]    History of Present Illness:  Mr. Lenz is a 68 yo M with hypertension, borderline hyperlipidemia seen in past for an abnormal EKG with Q waves in V1 and V2 with question of old anteroseptal infarct. Echo 2015 was normal with no infarct.      On his last visit he was here for a preop cardiac evaluation prior to surgery for inguinal hernia.  The surgery itself went okay, but he needed a catheter, had a UTI thereafter.  He has recovered from this though.  Cardiac-wise denies any palpitations.  No exertional chest pain or shortness of breath.  He is going to have an upcoming colonoscopy with Dr. Braun and will give him clearance for this and he can hold his blood thinner 48 hours prior.  He is staying active.  Looking forward to traveling to [place] and then a [place] Cruise to [place]. He is compensated on exam with clear lungs and no lower extremity edema.  Soc hx.  No tobacco.  Fam hx.  No early CAD.  Assessment/Plan:  1. Paroxysmal a-fib, PVCs, stable, in sinus rhythm, and we did have him see Dr. Lomeli in the past for consideration of ablation, but has done well with medical management.  Will have him follow back in six months.  2. Chronic anticoagulation.  No bleeding issues on Eliquis. He does have a brother who sounds like he had a Watchman and we had some discussion about this.  For him, it is not indicated at this time.  3. Essential hypertension.  Blood pressure controlled.  4. Mixed hyperlipidemia.  5. Colon polyps.  Followed by GI, Dr. Braun, and he gets a colonoscopy every three years.  Thus far these have been benign.  6. Lumbar back surgery.  Followed by ortho, Dr. Hernandez, and this was done in 2022.    Logan Lenz  has a past medical history of Acoustic neuroma (HCC), Arthritis, Diverticulosis, Encounter for screening colonoscopy, HTN, goal below 150/90, Hypertrophy of prostate without urinary obstruction and other lower urinary tract symptoms (LUTS), IBS

## 2024-08-15 ENCOUNTER — TELEPHONE (OUTPATIENT)
Age: 69
End: 2024-08-15

## 2024-08-15 NOTE — TELEPHONE ENCOUNTER
Received request from [facility] for cardiac clearance prior to Colonoscopy. Faxed clearance and last OV. Received confirmation fax receipt.

## 2024-10-07 ENCOUNTER — HOSPITAL ENCOUNTER (OUTPATIENT)
Facility: HOSPITAL | Age: 69
Discharge: HOME | End: 2024-10-10
Attending: FAMILY MEDICINE
Payer: MEDICARE

## 2024-10-07 DIAGNOSIS — R10.84 GENERALIZED ABDOMINAL PAIN: ICD-10-CM

## 2024-10-07 LAB — CREAT BLD-MCNC: 1 MG/DL (ref 0.6–1.3)

## 2024-10-07 PROCEDURE — 6360000004 HC RX CONTRAST MEDICATION: Performed by: FAMILY MEDICINE

## 2024-10-07 PROCEDURE — 74177 CT ABD & PELVIS W/CONTRAST: CPT

## 2024-10-07 PROCEDURE — 82565 ASSAY OF CREATININE: CPT

## 2024-10-07 RX ORDER — IOPAMIDOL 755 MG/ML
100 INJECTION, SOLUTION INTRAVASCULAR
Status: COMPLETED | OUTPATIENT
Start: 2024-10-07 | End: 2024-10-07

## 2024-10-07 RX ADMIN — IOPAMIDOL 100 ML: 755 INJECTION, SOLUTION INTRAVENOUS at 11:39

## 2024-12-05 ENCOUNTER — ANESTHESIA EVENT (OUTPATIENT)
Facility: HOSPITAL | Age: 69
End: 2024-12-05
Payer: MEDICARE

## 2024-12-05 ENCOUNTER — HOSPITAL ENCOUNTER (OUTPATIENT)
Facility: HOSPITAL | Age: 69
Setting detail: OUTPATIENT SURGERY
Discharge: HOME | End: 2024-12-05
Attending: INTERNAL MEDICINE | Admitting: INTERNAL MEDICINE
Payer: MEDICARE

## 2024-12-05 ENCOUNTER — ANESTHESIA (OUTPATIENT)
Facility: HOSPITAL | Age: 69
End: 2024-12-05
Payer: MEDICARE

## 2024-12-05 VITALS
DIASTOLIC BLOOD PRESSURE: 67 MMHG | HEART RATE: 63 BPM | BODY MASS INDEX: 24.33 KG/M2 | WEIGHT: 173.8 LBS | HEIGHT: 71 IN | TEMPERATURE: 97.7 F | RESPIRATION RATE: 14 BRPM | OXYGEN SATURATION: 98 % | SYSTOLIC BLOOD PRESSURE: 119 MMHG

## 2024-12-05 PROCEDURE — 2709999900 HC NON-CHARGEABLE SUPPLY: Performed by: INTERNAL MEDICINE

## 2024-12-05 PROCEDURE — 2500000003 HC RX 250 WO HCPCS: Performed by: STUDENT IN AN ORGANIZED HEALTH CARE EDUCATION/TRAINING PROGRAM

## 2024-12-05 PROCEDURE — 7100000011 HC PHASE II RECOVERY - ADDTL 15 MIN: Performed by: INTERNAL MEDICINE

## 2024-12-05 PROCEDURE — 7100000010 HC PHASE II RECOVERY - FIRST 15 MIN: Performed by: INTERNAL MEDICINE

## 2024-12-05 PROCEDURE — 2720000010 HC SURG SUPPLY STERILE: Performed by: INTERNAL MEDICINE

## 2024-12-05 PROCEDURE — 3600007502: Performed by: INTERNAL MEDICINE

## 2024-12-05 PROCEDURE — 2580000003 HC RX 258: Performed by: INTERNAL MEDICINE

## 2024-12-05 PROCEDURE — 3600007512: Performed by: INTERNAL MEDICINE

## 2024-12-05 PROCEDURE — 6360000002 HC RX W HCPCS

## 2024-12-05 PROCEDURE — 3700000000 HC ANESTHESIA ATTENDED CARE: Performed by: INTERNAL MEDICINE

## 2024-12-05 PROCEDURE — 88305 TISSUE EXAM BY PATHOLOGIST: CPT

## 2024-12-05 PROCEDURE — 3700000001 HC ADD 15 MINUTES (ANESTHESIA): Performed by: INTERNAL MEDICINE

## 2024-12-05 RX ORDER — SODIUM CHLORIDE 0.9 % (FLUSH) 0.9 %
5-40 SYRINGE (ML) INJECTION EVERY 12 HOURS SCHEDULED
Status: DISCONTINUED | OUTPATIENT
Start: 2024-12-05 | End: 2024-12-05 | Stop reason: HOSPADM

## 2024-12-05 RX ORDER — LIDOCAINE HYDROCHLORIDE 20 MG/ML
INJECTION, SOLUTION EPIDURAL; INFILTRATION; INTRACAUDAL; PERINEURAL
Status: DISCONTINUED | OUTPATIENT
Start: 2024-12-05 | End: 2024-12-05 | Stop reason: SDUPTHER

## 2024-12-05 RX ORDER — SODIUM CHLORIDE 9 MG/ML
INJECTION, SOLUTION INTRAVENOUS PRN
Status: DISCONTINUED | OUTPATIENT
Start: 2024-12-05 | End: 2024-12-05 | Stop reason: HOSPADM

## 2024-12-05 RX ORDER — SODIUM CHLORIDE 0.9 % (FLUSH) 0.9 %
5-40 SYRINGE (ML) INJECTION PRN
Status: DISCONTINUED | OUTPATIENT
Start: 2024-12-05 | End: 2024-12-05 | Stop reason: HOSPADM

## 2024-12-05 RX ORDER — PHENYLEPHRINE HCL IN 0.9% NACL 0.4MG/10ML
SYRINGE (ML) INTRAVENOUS
Status: DISCONTINUED | OUTPATIENT
Start: 2024-12-05 | End: 2024-12-05 | Stop reason: SDUPTHER

## 2024-12-05 RX ORDER — EPHEDRINE SULFATE 50 MG/ML
INJECTION INTRAVENOUS
Status: DISCONTINUED | OUTPATIENT
Start: 2024-12-05 | End: 2024-12-05 | Stop reason: SDUPTHER

## 2024-12-05 RX ORDER — SODIUM CHLORIDE 9 MG/ML
INJECTION, SOLUTION INTRAVENOUS CONTINUOUS
Status: DISCONTINUED | OUTPATIENT
Start: 2024-12-05 | End: 2024-12-05 | Stop reason: HOSPADM

## 2024-12-05 RX ORDER — GLYCOPYRROLATE 0.2 MG/ML
INJECTION INTRAMUSCULAR; INTRAVENOUS
Status: DISCONTINUED | OUTPATIENT
Start: 2024-12-05 | End: 2024-12-05 | Stop reason: SDUPTHER

## 2024-12-05 RX ADMIN — PROPOFOL 25 MG: 10 INJECTION, EMULSION INTRAVENOUS at 11:44

## 2024-12-05 RX ADMIN — PROPOFOL 50 MG: 10 INJECTION, EMULSION INTRAVENOUS at 12:01

## 2024-12-05 RX ADMIN — SODIUM CHLORIDE: 9 INJECTION, SOLUTION INTRAVENOUS at 10:49

## 2024-12-05 RX ADMIN — PROPOFOL 30 MG: 10 INJECTION, EMULSION INTRAVENOUS at 11:56

## 2024-12-05 RX ADMIN — Medication 80 MCG: at 11:55

## 2024-12-05 RX ADMIN — PROPOFOL 30 MG: 10 INJECTION, EMULSION INTRAVENOUS at 11:52

## 2024-12-05 RX ADMIN — GLYCOPYRROLATE 0.2 MG: 0.2 INJECTION INTRAMUSCULAR; INTRAVENOUS at 11:41

## 2024-12-05 RX ADMIN — PROPOFOL 50 MG: 10 INJECTION, EMULSION INTRAVENOUS at 11:59

## 2024-12-05 RX ADMIN — PROPOFOL 25 MG: 10 INJECTION, EMULSION INTRAVENOUS at 11:46

## 2024-12-05 RX ADMIN — PROPOFOL 40 MG: 10 INJECTION, EMULSION INTRAVENOUS at 11:42

## 2024-12-05 RX ADMIN — PROPOFOL 80 MG: 10 INJECTION, EMULSION INTRAVENOUS at 11:38

## 2024-12-05 RX ADMIN — PROPOFOL 30 MG: 10 INJECTION, EMULSION INTRAVENOUS at 11:41

## 2024-12-05 RX ADMIN — Medication 80 MCG: at 11:58

## 2024-12-05 RX ADMIN — EPHEDRINE SULFATE 5 MG: 50 INJECTION INTRAVENOUS at 10:48

## 2024-12-05 RX ADMIN — PROPOFOL 40 MG: 10 INJECTION, EMULSION INTRAVENOUS at 11:48

## 2024-12-05 RX ADMIN — Medication 120 MCG: at 12:01

## 2024-12-05 RX ADMIN — PROPOFOL 30 MG: 10 INJECTION, EMULSION INTRAVENOUS at 12:05

## 2024-12-05 RX ADMIN — LIDOCAINE HYDROCHLORIDE 50 MG: 20 INJECTION, SOLUTION EPIDURAL; INFILTRATION; INTRACAUDAL; PERINEURAL at 11:38

## 2024-12-05 ASSESSMENT — PAIN - FUNCTIONAL ASSESSMENT
PAIN_FUNCTIONAL_ASSESSMENT: NONE - DENIES PAIN

## 2024-12-05 NOTE — PROGRESS NOTES
Initial RN admission and assessment performed and documented in Endoscopy navigator.     Patient evaluated by anesthesia in pre-procedure holding.     All procedural vital signs, airway assessment, and level of consciousness information monitored and recorded by anesthesia staff on the anesthesia record.     Report received from CRNA post procedure.  Patient transported to recovery area by RN.    Endoscopy post procedure time out was performed and specimens were verified with physician.    Endoscope was pre-cleaned at bedside immediately following procedure by иван tech..

## 2024-12-05 NOTE — PROGRESS NOTES
Called and spoke with patient about appt information. Verified patient name and date of birth, scheduled procedure, and informed consent. Reviewed general discharge instructions and  information.  Assessed patient. Awake, alert, and oriented per baseline. Vital signs stable (see vital sign flowsheet). Respiratory status within defined limits, abdomen soft and non tender. Skin with in defined limits.

## 2024-12-05 NOTE — DISCHARGE INSTRUCTIONS
[facility]  [address] [place] [id number]    COLON DISCHARGE INSTRUCTIONS    Logan Lenz  [id number]  1955    Discomfort:  Redness at IV site- apply warm compress to area; if redness or soreness persist- contact your physician  There may be a slight amount of blood passed from the rectum  Gaseous discomfort- walking, belching will help relieve any discomfort  You may not operate a vehicle for 12 hours  You may not engage in an occupation involving machinery or appliances for rest of today  You may not drink alcoholic beverages for at least 12 hours  Avoid making any critical decisions for at least 24 hour  DIET:  You may resume your regular diet - however -  remember your colon is empty and a heavy meal will produce gas.  Avoid these foods:  vegetables, fried / greasy foods, carbonated drinks     ACTIVITY:  You may  resume your normal daily activities it is recommended that you spend the remainder of the day resting -  avoid any strenuous activity.    CALL M.D.  ANY SIGN OF:   Increasing pain, nausea, vomiting  Abdominal distension (swelling)  New increased bleeding (oral or rectal)  Fever (chills)  Pain in chest area  Bloody discharge from nose or mouth  Shortness of breath      Follow-up Instructions:   Call Dr. Moustapha Braun for any questions or problems at [phone]      ENDOSCOPY FINDINGS:   Your colonoscopy showed two polyps, which were removed. We will contact you about the pathology results and when your next colonoscopy will be due. You may resume your blood thinner on 12/8/24.  Telephone # [phone]      Signed By: Moustapha Bruan MD     12/5/2024  12:12 PM

## 2024-12-05 NOTE — OP NOTE
[facility] [address] [place], [place] [address]        Colonoscopy Operative Report    Logan Lenz  [id number]  1955      Procedure Type:   Colonoscopy with endoscopic mucosal resection    Indications:    Personal history of colon polyps (screening only)         Pre-operative Diagnosis: see indication above    Post-operative Diagnosis:  See findings below    :  Moustapha Braun MD    Staff: Circulator: Emiliana Restrepo RN  Endoscopy Technician: Anmol San     Referring Provider: Yenny Galeano MD      Sedation:  MAC      Procedure Details:  After informed consent was obtained with all risks and benefits of procedure explained and preoperative exam completed, the patient was taken to the endoscopy suite and placed in the left lateral decubitus position.  Upon sequential sedation as per above, a digital rectal exam was performed demonstrating internal hemorrhoids.  The Olympus pediatric videocolonoscope  was inserted in the rectum and carefully advanced to the terminal ileum.  The cecum was identified by the ileocecal valve and appendiceal orifice.  The quality of preparation was good.  The colonoscope was slowly withdrawn with careful evaluation between folds. Retroflexion in the rectum was completed .     Findings:   An 18 mm sessile polyp in ascending colon was seen. This was successfully lifted with Blueboost lifting agent. The blue dye component clearly demarcated the polyp borders. The polyp was removed in single piece using a 13 mm stiff hexagonal hot snare. The mucosal defect was closed with three Resolution 360 ULTRA clips.  A 10 mm sessile polyp was seen in ascending colon. This was removed in a piecemeal fashion using a 27 mm flexible oval hot snare.        Specimen Removed:  1. Ascending colon polyps    Complications: None.     EBL:  None.    Impression:     As above    Recommendations:  Follow up surgical pathology  Timing of repeat colonoscopy to be

## 2024-12-05 NOTE — ANESTHESIA PRE PROCEDURE
Department of Anesthesiology  Preprocedure Note       Name:  Logan Lenz   Age:  69 y.o.  :  1955                                          MRN:  [id number]         Date:  2024      Surgeon: Surgeon(s):  Moustapha Braun MD    Procedure: Procedure(s):  COLONOSCOPY DIAGNOSTIC    Medications prior to admission:   Prior to Admission medications    Medication Sig Start Date End Date Taking? Authorizing Provider   b complex vitamins capsule Take 1 capsule by mouth daily   Yes Provider, MD Love   olmesartan (BENICAR) 40 MG tablet Take 1 tablet by mouth daily 24  Yes Vinny Saldivar MD   metoprolol succinate (TOPROL XL) 25 MG extended release tablet TAKE 1 TABLET DAILY 3/25/24  Yes Vinny Saldivar MD   atorvastatin (LIPITOR) 10 MG tablet TAKE 1 TABLET DAILY 21  Yes Automatic Reconciliation, Ar   calcium carbonate (OYSTER SHELL CALCIUM 500 MG) 1250 (500 Ca) MG tablet Take by mouth daily   Yes Automatic Reconciliation, Ar   Cholecalciferol 50 MCG (2000 UT) TABS Take by mouth   Yes Automatic Reconciliation, Ar   Cinnamon 500 MG CAPS Take by mouth   Yes Automatic Reconciliation, Ar   clobetasol (TEMOVATE) 0.05 % ointment Apply 1 Bottle topically as needed 20  Yes Automatic Reconciliation, Ar   cyanocobalamin 1000 MCG tablet Take by mouth daily   Yes Automatic Reconciliation, Ar   docusate (COLACE, DULCOLAX) 100 MG CAPS Take by mouth 2 times daily   Yes Automatic Reconciliation, Ar   dutasteride (AVODART) 0.5 MG capsule Take by mouth daily   Yes Automatic Reconciliation, Ar   gabapentin (NEURONTIN) 300 MG capsule Take by mouth. 23  Yes Automatic Reconciliation, Ar   glucosamine-chondroitin 750-600 MG TABS tablet Take by mouth 2 times daily   Yes Automatic Reconciliation, Ar   tamsulosin (FLOMAX) 0.4 MG capsule Take by mouth as needed   Yes Automatic Reconciliation, Ar   ELIQUIS 5 MG TABS tablet TAKE 1 TABLET TWICE A DAY 24   Vinny Saldivar MD       Current medications:    Current

## 2024-12-05 NOTE — H&P
[facility] [address] [address]        History and Physical       NAME:  Logan Lenz   :   1955   MRN:   [id number]             History of Present Illness:  Patient is a 69 y.o. who is seen for history of colon polyps.     PMH:  Past Medical History:   Diagnosis Date    Acoustic neuroma (HCC)     R  Juanis/ns    Arthritis     Diverticulosis     Encounter for screening colonoscopy 2018    Dr. Braun - repeat in 3 years    HTN, goal below 150/90     Hypertrophy of prostate without urinary obstruction and other lower urinary tract symptoms (LUTS)     Felix/uro    IBS (irritable bowel syndrome)     Lumbar stenosis     severe  Van/os    Other and unspecified hyperlipidemia 2011    Paroxysmal atrial fibrillation (HCC)     ASA for anticoagulation. Beta blocker.    Unstable angina (HCC)     negative stress test 10/2016       PSH:  Past Surgical History:   Procedure Laterality Date    BIOPSY PROSTATE  2006    CHOLECYSTECTOMY      COLONOSCOPY N/A 07/15/2021    . performed by Moustapha Braun MD at [facility] ENDOSCOPY    HERNIA REPAIR      LAMINECTOMY          NEUROLOGICAL SURGERY      GAMMA KNIFE RADIO SURGERY, for acoustic neuroma       Allergies:  Allergies   Allergen Reactions    Hydrocodone Shortness Of Breath    Ciprofloxacin Myalgia    Codeine Other (See Comments)     Since allergy to hydrocodone shouldn't take codeine    Sulfa Antibiotics Myalgia    Trimethoprim      Other Reaction(s): MUSCLE PAIN, Unknown       Home Medications:  Prior to Admission Medications   Prescriptions Last Dose Informant Patient Reported? Taking?   Cholecalciferol 50 MCG (2000) TABS Past Week  Yes Yes   Sig: Take by mouth   Cinnamon 500 MG CAPS Past Week  Yes Yes   Sig: Take by mouth   ELIQUIS 5 MG TABS tablet 2024  No No   Sig: TAKE 1 TABLET TWICE A DAY   atorvastatin (LIPITOR) 10 MG tablet 2024  Yes Yes   Sig: TAKE 1 TABLET DAILY   b complex

## 2024-12-06 NOTE — ANESTHESIA POSTPROCEDURE EVALUATION
Department of Anesthesiology  Postprocedure Note    Patient: Logan Lenz  MRN: [id number]  YOB: 1955  Date of evaluation: 12/6/2024    Procedure Summary       Date: 12/05/24 Room / Location: [facility] ENDO 01 / [facility] ENDOSCOPY    Anesthesia Start: 1130 Anesthesia Stop: 1211    Procedure: COLONOSCOPY DIAGNOSTIC (Lower GI Region) Diagnosis:       History of colon polyps      (History of colon polyps [Z86.0100])    Surgeons: Moustapha Braun MD Responsible Provider: Verona Maciel DO    Anesthesia Type: MAC ASA Status: 2            Anesthesia Type: No value filed.    Jersey Phase I: Jersey Score: 10    Jersey Phase II: Jersey Score: 9    Anesthesia Post Evaluation    Patient location during evaluation: PACU  Patient participation: complete - patient participated  Level of consciousness: awake and alert  Pain score: 0  Airway patency: patent  Nausea & Vomiting: no nausea and no vomiting  Cardiovascular status: blood pressure returned to baseline and hemodynamically stable  Respiratory status: acceptable and room air  Hydration status: euvolemic  Multimodal analgesia pain management approach  Pain management: adequate and satisfactory to patient    No notable events documented.

## 2024-12-16 ENCOUNTER — APPOINTMENT (OUTPATIENT)
Facility: HOSPITAL | Age: 69
DRG: 395 | End: 2024-12-16
Payer: MEDICARE

## 2024-12-16 ENCOUNTER — HOSPITAL ENCOUNTER (INPATIENT)
Facility: HOSPITAL | Age: 69
LOS: 2 days | Discharge: HOME | DRG: 395 | End: 2024-12-18
Attending: STUDENT IN AN ORGANIZED HEALTH CARE EDUCATION/TRAINING PROGRAM | Admitting: INTERNAL MEDICINE
Payer: MEDICARE

## 2024-12-16 DIAGNOSIS — T18.9XXA SWALLOWED FOREIGN BODY, INITIAL ENCOUNTER: Primary | ICD-10-CM

## 2024-12-16 LAB
ALBUMIN SERPL-MCNC: 3.5 G/DL (ref 3.5–5)
ALBUMIN/GLOB SERPL: 1 (ref 1.1–2.2)
ALP SERPL-CCNC: 85 U/L (ref 45–117)
ALT SERPL-CCNC: 43 U/L (ref 12–78)
ANION GAP SERPL CALC-SCNC: 11 MMOL/L (ref 2–12)
AST SERPL-CCNC: 25 U/L (ref 15–37)
BASOPHILS # BLD: 0.1 K/UL (ref 0–0.1)
BASOPHILS NFR BLD: 1 % (ref 0–1)
BILIRUB SERPL-MCNC: 0.4 MG/DL (ref 0.2–1)
BUN SERPL-MCNC: 24 MG/DL (ref 6–20)
BUN/CREAT SERPL: 23 (ref 12–20)
CALCIUM SERPL-MCNC: 8.9 MG/DL (ref 8.5–10.1)
CHLORIDE SERPL-SCNC: 102 MMOL/L (ref 97–108)
CO2 SERPL-SCNC: 25 MMOL/L (ref 21–32)
CREAT SERPL-MCNC: 1.05 MG/DL (ref 0.7–1.3)
DIFFERENTIAL METHOD BLD: NORMAL
EOSINOPHIL # BLD: 0.1 K/UL (ref 0–0.4)
EOSINOPHIL NFR BLD: 1 % (ref 0–7)
ERYTHROCYTE [DISTWIDTH] IN BLOOD BY AUTOMATED COUNT: 13.2 % (ref 11.5–14.5)
GLOBULIN SER CALC-MCNC: 3.5 G/DL (ref 2–4)
GLUCOSE SERPL-MCNC: 147 MG/DL (ref 65–100)
HCT VFR BLD AUTO: 48.3 % (ref 36.6–50.3)
HGB BLD-MCNC: 16.4 G/DL (ref 12.1–17)
IMM GRANULOCYTES # BLD AUTO: 0 K/UL (ref 0–0.04)
IMM GRANULOCYTES NFR BLD AUTO: 0 % (ref 0–0.5)
LYMPHOCYTES # BLD: 1.5 K/UL (ref 0.8–3.5)
LYMPHOCYTES NFR BLD: 20 % (ref 12–49)
MCH RBC QN AUTO: 30.2 PG (ref 26–34)
MCHC RBC AUTO-ENTMCNC: 34 G/DL (ref 30–36.5)
MCV RBC AUTO: 89 FL (ref 80–99)
MONOCYTES # BLD: 0.7 K/UL (ref 0–1)
MONOCYTES NFR BLD: 9 % (ref 5–13)
NEUTS SEG # BLD: 5.1 K/UL (ref 1.8–8)
NEUTS SEG NFR BLD: 69 % (ref 32–75)
NRBC # BLD: 0 K/UL (ref 0–0.01)
NRBC BLD-RTO: 0 PER 100 WBC
PLATELET # BLD AUTO: 214 K/UL (ref 150–400)
PMV BLD AUTO: 11 FL (ref 8.9–12.9)
POTASSIUM SERPL-SCNC: 3.7 MMOL/L (ref 3.5–5.1)
PROT SERPL-MCNC: 7 G/DL (ref 6.4–8.2)
RBC # BLD AUTO: 5.43 M/UL (ref 4.1–5.7)
SODIUM SERPL-SCNC: 138 MMOL/L (ref 136–145)
TROPONIN I SERPL HS-MCNC: 5 NG/L (ref 0–76)
WBC # BLD AUTO: 7.4 K/UL (ref 4.1–11.1)

## 2024-12-16 PROCEDURE — 36415 COLL VENOUS BLD VENIPUNCTURE: CPT

## 2024-12-16 PROCEDURE — 1100000000 HC RM PRIVATE

## 2024-12-16 PROCEDURE — 71260 CT THORAX DX C+: CPT

## 2024-12-16 PROCEDURE — 84484 ASSAY OF TROPONIN QUANT: CPT

## 2024-12-16 PROCEDURE — 99285 EMERGENCY DEPT VISIT HI MDM: CPT

## 2024-12-16 PROCEDURE — 6360000004 HC RX CONTRAST MEDICATION: Performed by: STUDENT IN AN ORGANIZED HEALTH CARE EDUCATION/TRAINING PROGRAM

## 2024-12-16 PROCEDURE — 80053 COMPREHEN METABOLIC PANEL: CPT

## 2024-12-16 PROCEDURE — 85025 COMPLETE CBC W/AUTO DIFF WBC: CPT

## 2024-12-16 RX ORDER — IOPAMIDOL 755 MG/ML
100 INJECTION, SOLUTION INTRAVASCULAR ONCE
Status: COMPLETED | OUTPATIENT
Start: 2024-12-16 | End: 2024-12-16

## 2024-12-16 RX ADMIN — IOPAMIDOL 100 ML: 755 INJECTION, SOLUTION INTRAVENOUS at 19:54

## 2024-12-16 ASSESSMENT — PAIN DESCRIPTION - LOCATION: LOCATION: ABDOMEN

## 2024-12-16 ASSESSMENT — PAIN SCALES - GENERAL: PAINLEVEL_OUTOF10: 1

## 2024-12-16 ASSESSMENT — PAIN DESCRIPTION - ORIENTATION: ORIENTATION: UPPER

## 2024-12-16 ASSESSMENT — ENCOUNTER SYMPTOMS
ABDOMINAL PAIN: 1
SHORTNESS OF BREATH: 0

## 2024-12-17 ENCOUNTER — APPOINTMENT (OUTPATIENT)
Facility: HOSPITAL | Age: 69
DRG: 395 | End: 2024-12-17
Payer: MEDICARE

## 2024-12-17 LAB
ALBUMIN SERPL-MCNC: 3.6 G/DL (ref 3.5–5)
ALBUMIN/GLOB SERPL: 1.1 (ref 1.1–2.2)
ALP SERPL-CCNC: 78 U/L (ref 45–117)
ALT SERPL-CCNC: 37 U/L (ref 12–78)
ANION GAP SERPL CALC-SCNC: 4 MMOL/L (ref 2–12)
AST SERPL-CCNC: 25 U/L (ref 15–37)
BASOPHILS # BLD: 0.1 K/UL (ref 0–0.1)
BASOPHILS NFR BLD: 1 % (ref 0–1)
BILIRUB SERPL-MCNC: 0.6 MG/DL (ref 0.2–1)
BUN SERPL-MCNC: 17 MG/DL (ref 6–20)
BUN/CREAT SERPL: 18 (ref 12–20)
CALCIUM SERPL-MCNC: 9.3 MG/DL (ref 8.5–10.1)
CHLORIDE SERPL-SCNC: 106 MMOL/L (ref 97–108)
CO2 SERPL-SCNC: 29 MMOL/L (ref 21–32)
CREAT SERPL-MCNC: 0.94 MG/DL (ref 0.7–1.3)
DIFFERENTIAL METHOD BLD: NORMAL
EOSINOPHIL # BLD: 0 K/UL (ref 0–0.4)
EOSINOPHIL NFR BLD: 0 % (ref 0–7)
ERYTHROCYTE [DISTWIDTH] IN BLOOD BY AUTOMATED COUNT: 13 % (ref 11.5–14.5)
EST. AVERAGE GLUCOSE BLD GHB EST-MCNC: 108 MG/DL
GLOBULIN SER CALC-MCNC: 3.4 G/DL (ref 2–4)
GLUCOSE SERPL-MCNC: 111 MG/DL (ref 65–100)
HBA1C MFR BLD: 5.4 % (ref 4–5.6)
HCT VFR BLD AUTO: 47.6 % (ref 36.6–50.3)
HGB BLD-MCNC: 16 G/DL (ref 12.1–17)
IMM GRANULOCYTES # BLD AUTO: 0 K/UL (ref 0–0.04)
IMM GRANULOCYTES NFR BLD AUTO: 0 % (ref 0–0.5)
LYMPHOCYTES # BLD: 1.2 K/UL (ref 0.8–3.5)
LYMPHOCYTES NFR BLD: 17 % (ref 12–49)
MAGNESIUM SERPL-MCNC: 2.2 MG/DL (ref 1.6–2.4)
MCH RBC QN AUTO: 30.3 PG (ref 26–34)
MCHC RBC AUTO-ENTMCNC: 33.6 G/DL (ref 30–36.5)
MCV RBC AUTO: 90.2 FL (ref 80–99)
MONOCYTES # BLD: 0.7 K/UL (ref 0–1)
MONOCYTES NFR BLD: 10 % (ref 5–13)
NEUTS SEG # BLD: 5.2 K/UL (ref 1.8–8)
NEUTS SEG NFR BLD: 72 % (ref 32–75)
NRBC # BLD: 0 K/UL (ref 0–0.01)
NRBC BLD-RTO: 0 PER 100 WBC
PHOSPHATE SERPL-MCNC: 3.3 MG/DL (ref 2.6–4.7)
PLATELET # BLD AUTO: 224 K/UL (ref 150–400)
PMV BLD AUTO: 11.1 FL (ref 8.9–12.9)
POTASSIUM SERPL-SCNC: 4 MMOL/L (ref 3.5–5.1)
PROT SERPL-MCNC: 7 G/DL (ref 6.4–8.2)
RBC # BLD AUTO: 5.28 M/UL (ref 4.1–5.7)
SODIUM SERPL-SCNC: 139 MMOL/L (ref 136–145)
TSH SERPL DL<=0.05 MIU/L-ACNC: 4.3 UIU/ML (ref 0.36–3.74)
WBC # BLD AUTO: 7.2 K/UL (ref 4.1–11.1)

## 2024-12-17 PROCEDURE — 83735 ASSAY OF MAGNESIUM: CPT

## 2024-12-17 PROCEDURE — 84100 ASSAY OF PHOSPHORUS: CPT

## 2024-12-17 PROCEDURE — 6370000000 HC RX 637 (ALT 250 FOR IP): Performed by: STUDENT IN AN ORGANIZED HEALTH CARE EDUCATION/TRAINING PROGRAM

## 2024-12-17 PROCEDURE — 2580000003 HC RX 258: Performed by: INTERNAL MEDICINE

## 2024-12-17 PROCEDURE — 85025 COMPLETE CBC W/AUTO DIFF WBC: CPT

## 2024-12-17 PROCEDURE — 83036 HEMOGLOBIN GLYCOSYLATED A1C: CPT

## 2024-12-17 PROCEDURE — 1200000000 HC SEMI PRIVATE

## 2024-12-17 PROCEDURE — 80053 COMPREHEN METABOLIC PANEL: CPT

## 2024-12-17 PROCEDURE — 74018 RADEX ABDOMEN 1 VIEW: CPT

## 2024-12-17 PROCEDURE — 84443 ASSAY THYROID STIM HORMONE: CPT

## 2024-12-17 RX ORDER — MAGNESIUM SULFATE IN WATER 40 MG/ML
2000 INJECTION, SOLUTION INTRAVENOUS PRN
Status: DISCONTINUED | OUTPATIENT
Start: 2024-12-17 | End: 2024-12-18 | Stop reason: HOSPADM

## 2024-12-17 RX ORDER — ONDANSETRON 2 MG/ML
4 INJECTION INTRAMUSCULAR; INTRAVENOUS EVERY 6 HOURS PRN
Status: DISCONTINUED | OUTPATIENT
Start: 2024-12-17 | End: 2024-12-18 | Stop reason: HOSPADM

## 2024-12-17 RX ORDER — POTASSIUM CHLORIDE 7.45 MG/ML
10 INJECTION INTRAVENOUS PRN
Status: DISCONTINUED | OUTPATIENT
Start: 2024-12-17 | End: 2024-12-18 | Stop reason: HOSPADM

## 2024-12-17 RX ORDER — SODIUM CHLORIDE 0.9 % (FLUSH) 0.9 %
5-40 SYRINGE (ML) INJECTION EVERY 12 HOURS SCHEDULED
Status: DISCONTINUED | OUTPATIENT
Start: 2024-12-17 | End: 2024-12-18 | Stop reason: HOSPADM

## 2024-12-17 RX ORDER — ACETAMINOPHEN 325 MG/1
650 TABLET ORAL EVERY 6 HOURS PRN
Status: DISCONTINUED | OUTPATIENT
Start: 2024-12-17 | End: 2024-12-18 | Stop reason: HOSPADM

## 2024-12-17 RX ORDER — SODIUM CHLORIDE 9 MG/ML
INJECTION, SOLUTION INTRAVENOUS PRN
Status: DISCONTINUED | OUTPATIENT
Start: 2024-12-17 | End: 2024-12-18 | Stop reason: HOSPADM

## 2024-12-17 RX ORDER — SODIUM CHLORIDE 9 MG/ML
INJECTION, SOLUTION INTRAVENOUS CONTINUOUS
Status: DISCONTINUED | OUTPATIENT
Start: 2024-12-17 | End: 2024-12-18 | Stop reason: HOSPADM

## 2024-12-17 RX ORDER — ACETAMINOPHEN 650 MG/1
650 SUPPOSITORY RECTAL EVERY 6 HOURS PRN
Status: DISCONTINUED | OUTPATIENT
Start: 2024-12-17 | End: 2024-12-18 | Stop reason: HOSPADM

## 2024-12-17 RX ORDER — CYANOCOBALAMIN (VITAMIN B-12) 1000 MCG
200 TABLET, EXTENDED RELEASE ORAL
COMMUNITY

## 2024-12-17 RX ORDER — FENTANYL CITRATE 50 UG/ML
25 INJECTION, SOLUTION INTRAMUSCULAR; INTRAVENOUS EVERY 4 HOURS PRN
Status: DISCONTINUED | OUTPATIENT
Start: 2024-12-17 | End: 2024-12-18 | Stop reason: HOSPADM

## 2024-12-17 RX ORDER — ZINC GLUCONATE 50 MG
50 TABLET ORAL
COMMUNITY

## 2024-12-17 RX ORDER — POTASSIUM CHLORIDE 750 MG/1
40 TABLET, EXTENDED RELEASE ORAL PRN
Status: DISCONTINUED | OUTPATIENT
Start: 2024-12-17 | End: 2024-12-18 | Stop reason: HOSPADM

## 2024-12-17 RX ORDER — NALOXONE HYDROCHLORIDE 0.4 MG/ML
0.4 INJECTION, SOLUTION INTRAMUSCULAR; INTRAVENOUS; SUBCUTANEOUS PRN
Status: DISCONTINUED | OUTPATIENT
Start: 2024-12-17 | End: 2024-12-18 | Stop reason: HOSPADM

## 2024-12-17 RX ORDER — METOPROLOL SUCCINATE 25 MG/1
25 TABLET, EXTENDED RELEASE ORAL NIGHTLY
Status: DISCONTINUED | OUTPATIENT
Start: 2024-12-17 | End: 2024-12-18 | Stop reason: HOSPADM

## 2024-12-17 RX ORDER — GLUCOSAMINE/CHONDR SU A SOD 750-600 MG
25 TABLET ORAL DAILY
COMMUNITY

## 2024-12-17 RX ORDER — MAGNESIUM 30 MG
250 TABLET ORAL DAILY
COMMUNITY

## 2024-12-17 RX ORDER — SODIUM CHLORIDE 0.9 % (FLUSH) 0.9 %
5-40 SYRINGE (ML) INJECTION PRN
Status: DISCONTINUED | OUTPATIENT
Start: 2024-12-17 | End: 2024-12-18 | Stop reason: HOSPADM

## 2024-12-17 RX ORDER — ONDANSETRON 4 MG/1
4 TABLET, ORALLY DISINTEGRATING ORAL EVERY 8 HOURS PRN
Status: DISCONTINUED | OUTPATIENT
Start: 2024-12-17 | End: 2024-12-18 | Stop reason: HOSPADM

## 2024-12-17 RX ORDER — POLYETHYLENE GLYCOL 3350 17 G/17G
17 POWDER, FOR SOLUTION ORAL DAILY PRN
Status: DISCONTINUED | OUTPATIENT
Start: 2024-12-17 | End: 2024-12-18 | Stop reason: HOSPADM

## 2024-12-17 RX ORDER — VIT C/B6/B5/MAGNESIUM/HERB 173 50-5-6-5MG
1500 CAPSULE ORAL DAILY
COMMUNITY

## 2024-12-17 RX ADMIN — SODIUM CHLORIDE: 9 INJECTION, SOLUTION INTRAVENOUS at 04:29

## 2024-12-17 RX ADMIN — POLYETHYLENE GLYCOL-3350 AND ELECTROLYTES 4000 ML: 236; 6.74; 5.86; 2.97; 22.74 POWDER, FOR SOLUTION ORAL at 03:18

## 2024-12-17 NOTE — H&P
History and Physical    Date of Service:  12/17/2024  Primary Care Provider: Yenny Galeano MD  Source of information: The patient and review of EMR    Chief Complaint: Swallowed Foreign Body      History of Presenting Illness:   Logan Lenz is a 69 y.o. adult with past medical history significant for dyslipidemia, hypertension, BPH, paroxysmal atrial fibrillation on Eliquis for anticoagulation presented at [facility] emergency room at [facility] for evaluation after accidentally swallowing of a piece of glass.  The piece of glass is most likely embedded in the salsa jar.  Patient swallowed the piece of glass when he ate wraps made from the salsa.  He was able to spit out a portion of the glass and came to the emergency room for further evaluation.  He reported mild epigastric pain with no nausea and vomiting.  He underwent routine colonoscopy about 2 weeks ago and benign polyps were removed.  CT scan of the chest abdomen and pelvis done in the emergency room shows 1.2 cm linear radiodense object in the cecum which may represent foreign body.  Patient was discussed with the GI service on-call who advised admission to the hospital for colonoscopy for foreign body removal.         REVIEW OF SYSTEMS:  REVIEW OF SYSTEMS:  HEAD, EYES, EARS, NOSE AND THROAT:  No headache.  No dizziness.  No blurring of vision.  No photophobia.  RESPIRATORY SYSTEM: No shortness of breath.  No cough.  No hemoptysis.  CARDIOVASCULAR SYSTEM:  No chest pain.  No orthopnea.  No palpitations.  GASTROINTESTINAL SYSTEM: Positive for abdominal pain no nausea or vomiting.  No diarrhea.  No constipation.  GENITOURINARY SYSTEM:  No dysuria, no urgency, and no frequency.     All other systems are reviewed and they are negative.        Past Medical History:   Diagnosis Date    Acoustic neuroma (HCC) 2012    R  Juanis/ns    Arthritis     Diverticulosis 2006    Encounter for screening colonoscopy 07/11/2018    Dr. Braun -  95%   BMI 25.18 kg/m²         PHYSICAL EXAM:   General appearance: Patient appears ill, in moderate distress.  Head: Normal cephalic, atraumatic.  Eyes: Normal eye movement, no redness no drainage.  Ears: Normal external ear and no drainage.  Nose: No deformity and no drainage.  Mouth and throat: No visible oral lesion.  Neck: Supple, no JVD no thyromegaly.  Chest: Clear breath sounds, no wheezing no crackles.  Heart: Normal S1 and S2, regular, no clinically appreciable murmur.  Abdomen: Soft nontender normal bowel sounds.  CNS: Alert oriented x3, no gross or focal neurological deficit.  Extremities: No edema, pulses 2+ bilaterally.  Musculoskeletal system: No joint deformity and swelling.  Skin: No active skin lesion seen in the exposed part of the body.  Psychiatry: Normal mood and affect.  Lymphatic system: No cervical lymphadenopathy.  Data Review:   I have independently reviewed and interpreted patient's lab and all other diagnostic data    Abnormal Labs Reviewed   COMPREHENSIVE METABOLIC PANEL - Abnormal; Notable for the following components:       Result Value    Glucose 147 (*)     BUN 24 (*)     BUN/Creatinine Ratio 23 (*)     Albumin/Globulin Ratio 1.0 (*)     All other components within normal limits   COMPREHENSIVE METABOLIC PANEL - Abnormal; Notable for the following components:    Glucose 111 (*)     All other components within normal limits   TSH - Abnormal; Notable for the following components:    TSH, 3rd Generation 4.30 (*)     All other components within normal limits       [unfilled]    IMAGING:   CT CHEST ABDOMEN PELVIS W CONTRAST Additional Contrast? None   Final Result         1. 2 cm linear radiodense object in the cecum which may represent foreign body.   2. Large hiatal hernia.   3. Enlarged prostate gland with mild cystitis.   4. Refer to above findings for complete details.         Electronically signed by Shahrzad Christie           ECG/ECHO:  [unfilled]       Notes reviewed  assistance of nursing staff  ANTICIPATED DISCHARGE: Greater than 48 hours.  ANTICIPATED DISPOSITION: Home  EMERGENCY CONTACT/SURROGATE DECISION MAKER: Ernst Lenz, other    CRITICAL CARE WAS PERFORMED FOR THIS ENCOUNTER: NO.      Signed By: Bri Barber MD     December 17, 2024         Please note that this dictation may have been completed with Dragon, the computer voice recognition software.  Quite often unanticipated grammatical, syntax, homophones, and other interpretive errors are inadvertently transcribed by the computer software.  Please disregard these errors.  Please excuse any errors that have escaped final proofreading.

## 2024-12-17 NOTE — PROGRESS NOTES
A Spiritual Care Partner Volunteer visited Logan SHAYLEE Lenz at [facility] in [facility] 5E1 SURGICAL UNIT on 12/17/2024     Documented by: Chaplain Lisa Lu

## 2024-12-17 NOTE — CONSULTS
[facility]    [address] [address]        GASTROENTEROLOGY CONSULTATION NOTE  Will MAULIK Benavides  [phone] office      NAME:  Logan Lenz   :   1955   MRN:   [id number]       Referring Physician: Dr. Luisito Pan    Consult Date: 2024 8:43 AM    Chief Complaint: swallowed foreign body     History of Present Illness:  Patient is a 69 y.o. who is seen in consultation at the request of Dr. Pan for swallowed foreign body.  Patient has a past medical history significant for dyslipidemia, hypertension, atrial fibrillation on Eliquis, and BPH.  He presented to the ED at [facility] for evaluation of accidentally swallowing a piece of glass.  Patient was transferred to [facility] for admission on 2024 for swallowed foreign body.    Patient reportedly swallowed a piece of glass from a salsa jar while eating a wrap last evening around 5:30PM prior to presentation.  He reports chewing something crunchy and spit out a piece of glass.  No nausea, vomiting, or abdominal pain.  Patient completed a bowel prep earlier this morning and reportedly cleared.  No hematochezia or melena.  No fevers or chills.      No NSAID use.  Patient is on Eliquis (last dose  AM).  Occasional alcohol use (1 drink per week).  No tobacco use.  History of inguinal hernia repairs.  Colonoscopy with endoscopic mucosal resection 2024 by Dr. Braun showed an 18 mm polyp in the ascending colon successfully removed, status post 3 clips; 10 mm polyp in the ascending colon-removed.  Pathology showed tubular adenomas. Repeat colonoscopy in 1 year.     I have reviewed the emergency room note, hospital admission note, notes by all other clinicians who have seen the patient during this hospitalization to date. I have reviewed the problem list and the reason for this hospitalization. I have reviewed the allergies and the medications the patient was taking at home prior to this  potassium bicarb-citric acid (EFFER-K) effervescent tablet 40 mEq  40 mEq Oral PRN    Or    potassium chloride 10 mEq/100 mL IVPB (Peripheral Line)  10 mEq IntraVENous PRN    magnesium sulfate 2000 mg in 50 mL IVPB premix  2,000 mg IntraVENous PRN    ondansetron (ZOFRAN-ODT) disintegrating tablet 4 mg  4 mg Oral Q8H PRN    Or    ondansetron (ZOFRAN) injection 4 mg  4 mg IntraVENous Q6H PRN    polyethylene glycol (GLYCOLAX) packet 17 g  17 g Oral Daily PRN    acetaminophen (TYLENOL) tablet 650 mg  650 mg Oral Q6H PRN    Or    acetaminophen (TYLENOL) suppository 650 mg  650 mg Rectal Q6H PRN    0.9 % sodium chloride infusion   IntraVENous Continuous    fentaNYL (SUBLIMAZE) injection 25 mcg  25 mcg IntraVENous Q4H PRN    naloxone (NARCAN) injection 0.4 mg  0.4 mg IntraVENous PRN       Social History:  Social History     Tobacco Use    Smoking status: Never     Passive exposure: Never    Smokeless tobacco: Never   Substance Use Topics    Alcohol use: Yes     Alcohol/week: 2.0 standard drinks of alcohol       Family History:  Family History   Problem Relation Age of Onset    Stroke Maternal Grandfather     Anesth Problems Neg Hx     Diabetes Mother     Hypertension Mother     Hypertension Brother     Diabetes Father     Hypertension Father     Cancer Father     Diabetes Brother     Cancer Mother         esophageal     Review of Systems:  Constitutional: negative fever, negative chills, negative weight loss  Eyes:   negative visual changes  ENT:   negative sore throat  Respiratory:  negative cough, negative dyspnea  Cards:  negative for chest pain, palpitations  GI:   See HPI  :  negative for frequency, dysuria  Integument:  negative for rash and pruritus  Heme:  negative for easy bruising and gum/nose bleeding  Musculoskeletal:negative for myalgias, back pain   Neuro:    negative for headaches, dizziness  Psych: negative for feelings of anxiety, depression     Objective:   Patient Vitals for the past 8 hrs:   BP Temp  in the cecum which may represent foreign body; large hiatal hernia. Status post colonoscopy with EMR 12/5/24 as above.   Atrial fibrillation: Eliquis on hold (last dose 12/16 AM).  Hypertension  Dyslipidemia     Patient Active Problem List   Diagnosis    History of acoustic neuroma    History of BPH    Mixed hyperlipidemia    Diverticulosis    Paroxysmal A-fib (HCC)    Benign essential HTN    Lumbar stenosis    IBS (irritable bowel syndrome)    Colon polyp    Lumbar spinal stenosis    Swallowed foreign body, initial encounter     Plan:       NPO  STAT KUB to evaluate for foreign body. Next consider colonoscopy.   CT with findings of subcentimeter low-density left hepatic lesion and mild nodular contour. Recommend outpatient follow up.   Patient was discussed with Dr. Miller.   Thank you for allowing me to participate in care of Logan Lenz.     Signed By: VIPIN Guerrero     12/17/2024  8:43 AM       GI attending note:    Vitals, labs, and imaging reviewed. Reviewed case with the ZOILA. Agree with the history, physical, and plan of the ZOILA.  Follow-up KUB consistent with endoscopic Hemoclip from recent colonoscopy-this will pass on its own.  No other foreign body seen on CT scan of the chest, abdomen, and pelvis.  Advance diet.  Will sign off.  Outpatient GI follow-up with Dr. Moustapha Braun. Please call with any questions or concerns.    Dr. Miller

## 2024-12-17 NOTE — ED PROVIDER NOTES
[facility]  EMERGENCY DEPARTMENT ENCOUNTER      Pt Name: Logan Lenz  MRN: [id number]  Birthdate 1955  Date of evaluation: 12/16/2024  Provider: Luisito Pan DO    CHIEF COMPLAINT       Chief Complaint   Patient presents with    Swallowed Foreign Body         HISTORY OF PRESENT ILLNESS   (Location/Symptom, Timing/Onset, Context/Setting, Quality, Duration, Modifying Factors, Severity)  Note limiting factors.   69-year-old male presents ED for evaluation of a swallowed piece of glass.  Patient reports that he was eating dinner today and noticed piece of glass in his food.  Patient had been making some salsa wraps and was using salsa from a jar.  Exam is also jar Endocet there was a 2 cm x 1 similar portion of glass missing from the jar.  Had some epigastric pain no nausea no vomiting.  Otherwise is asymptomatic            Review of External Medical Records:     Nursing Notes were reviewed.    REVIEW OF SYSTEMS    (2-9 systems for level 4, 10 or more for level 5)     Review of Systems   Constitutional:  Negative for fever.   Respiratory:  Negative for shortness of breath.    Cardiovascular:  Negative for chest pain.   Gastrointestinal:  Positive for abdominal pain.       Except as noted above the remainder of the review of systems was reviewed and negative.       PAST MEDICAL HISTORY     Past Medical History:   Diagnosis Date    Acoustic neuroma (HCC) 2012    R  Juanis/ns    Arthritis     Diverticulosis 2006    Encounter for screening colonoscopy 07/11/2018    Dr. Braun - repeat in 3 years    HTN, goal below 150/90     Hypertrophy of prostate without urinary obstruction and other lower urinary tract symptoms (LUTS)     Felix/uro    IBS (irritable bowel syndrome)     Lumbar stenosis     severe  Van/os    Other and unspecified hyperlipidemia 07/2011    Paroxysmal atrial fibrillation (HCC)     ASA for anticoagulation. Beta blocker.    Unstable angina ([facility])     negative stress test 10/2016

## 2024-12-17 NOTE — ED TRIAGE NOTES
Patient reports he made a asian wrap and bit into it and felt something hard in the wrap. He spat the wrap out and saw broken pieces of glasses. Upon investigating he found the salsa jar was broken on the bottom. Reports taking eliquis. Repots event took place 1730 today. Denies SOB or CP. Reports epigastric pain. Arrives ambulatory, A & Ox 4, and pov.

## 2024-12-17 NOTE — PROGRESS NOTES
[facility]                                                                                          Hospitalist Progress Note  David M Milligram, PA-C  Answering service: [phone] OR [phone] from in house phone        Date of Service:  2024  NAME:  Logan Lenz  :  1955  MRN:  [id number]      Admission Summary:   Logan Lenz is a 69 y.o. adult with past medical history significant for dyslipidemia, hypertension, BPH, paroxysmal atrial fibrillation on Eliquis for anticoagulation presented at [facility] emergency room at [facility] for evaluation after accidentally swallowing of a piece of glass.  The piece of glass is most likely embedded in the salsa jar.  Patient swallowed the piece of glass when he ate wraps made from the salsa.  He was able to spit out a portion of the glass and came to the emergency room for further evaluation.  He reported mild epigastric pain with no nausea and vomiting.  He underwent routine colonoscopy about 2 weeks ago and benign polyps were removed.  CT scan of the chest abdomen and pelvis done in the emergency room shows 1.2 cm linear radiodense object in the cecum which may represent foreign body.  Patient was discussed with the GI service on-call who advised admission to the hospital for colonoscopy for foreign body removal.     Interval history / Subjective:   No acute interval events, patient denies any abdominal pain, N/V/D or bloating. Awaiting GI evaluation for potential colonoscopy     Assessment & Plan:     Swallowed foreign body vs displaced Endoscopy clip  - GI following, appreciate assistance  - S/P bowel prep  - NPO for now  - MIVF: NS @ 125ml/hr  - KUB (): An endoscopy clip in the cecum is new from 10/7/2024   - May need repeat colonoscopy, GI to eval after KUB today ()      Dyslipidemia :   - Resume PTA statin once no longer NPO     Essential hypertension  - Continue home meds once off NPO

## 2024-12-17 NOTE — CARE COORDINATION
Care Management Initial Assessment       RUR: 8% - low  Readmission? No  1st IM letter given? Yes - 12/17/24  1st  letter given: No    Chart reviewed for low RUR. Patient admitted for swallowing a piece of glass with dinner yesterday. He is currently NPO with possible colonoscopy. At his baseline, patient is independent without the use of DME. He resides alone at address on file. Friends provide support as needed. No CM needs identified at time of assessment.       12/17/24 1202   Service Assessment   Patient Orientation Alert and Oriented   Cognition Alert   History Provided By Patient   Primary Caregiver Self   Support Systems Family Members;Friends/Neighbors   Patient's Healthcare Decision Maker is: Named in Scanned ACP Document   PCP Verified by CM Yes   Last Visit to PCP Within last 6 months   Prior Functional Level Independent in ADLs/IADLs   Current Functional Level Independent in ADLs/IADLs   Can patient return to prior living arrangement Yes   Ability to make needs known: Good   Family able to assist with home care needs: No   Would you like for me to discuss the discharge plan with any other family members/significant others, and if so, who? No   Financial Resources Medicare   Community Resources None   Social/Functional History   Lives With Alone   Type of Home House   Home Equipment None   Receives Help From Friend(s)   Prior Level of Assist for ADLs Independent   Prior Level of Assist for Homemaking Independent   Homemaking Responsibilities Yes   Ambulation Assistance Independent   Prior Level of Assist for Transfers Independent   Active  Yes   Mode of Transportation Car   Occupation Retired   Discharge Planning   Type of Residence House   Living Arrangements Alone   Current Services Prior To Admission None   Potential Assistance Needed N/A   DME Ordered? No   Potential Assistance Purchasing Medications No   Type of Home Care Services None   Patient expects to be discharged to: [place]    Services At/After Discharge   Transition of Care Consult (CM Consult) Discharge Planning   Services At/After Discharge None   [place] Resource Information Provided? No   Mode of Transport at Discharge Other (see comment)  (friend)   Confirm Follow Up Transport Self     David Baker, MPH  Care Manager l [facility]  Available via [facility]

## 2024-12-18 VITALS
HEART RATE: 66 BPM | TEMPERATURE: 97.3 F | RESPIRATION RATE: 18 BRPM | DIASTOLIC BLOOD PRESSURE: 87 MMHG | OXYGEN SATURATION: 97 % | SYSTOLIC BLOOD PRESSURE: 140 MMHG | HEIGHT: 71 IN | WEIGHT: 180.56 LBS | BODY MASS INDEX: 25.28 KG/M2

## 2024-12-18 PROBLEM — T18.9XXA SWALLOWED FOREIGN BODY, INITIAL ENCOUNTER: Status: RESOLVED | Noted: 2024-12-16 | Resolved: 2024-12-18

## 2024-12-18 LAB — LIPASE SERPL-CCNC: 33 U/L (ref 13–75)

## 2024-12-18 PROCEDURE — 83690 ASSAY OF LIPASE: CPT

## 2024-12-18 NOTE — DISCHARGE SUMMARY
Discharge Summary       PATIENT ID: Logan Lenz  MRN: [id number]   YOB: 1955    DATE OF ADMISSION: 12/16/2024  6:54 PM    DATE OF DISCHARGE: 12/18/24   PRIMARY CARE PROVIDER: Yenny Galeano MD     ATTENDING PHYSICIAN: JVAON BIRMINGHAM MD  DISCHARGING PROVIDER: Thuy Balderas PA-C    To contact this individual call [phone] and ask the  to page.  If unavailable ask to be transferred the Adult Hospitalist Department.    CONSULTATIONS: IP CONSULT TO GI  IP CONSULT TO HOSPITALIST    PROCEDURES/SURGERIES: * No surgery found *     ADMITTING DIAGNOSES & HOSPITAL COURSE:     Logan Lenz is a 69 y.o. adult with past medical history significant for dyslipidemia, hypertension, BPH, paroxysmal atrial fibrillation on Eliquis for anticoagulation presented at [facility] emergency room at [facility] for evaluation after accidentally swallowing of a piece of glass.  The piece of glass is most likely embedded in the salsa jar.  Patient swallowed the piece of glass when he ate wraps made from the salsa.  He was able to spit out a portion of the glass and came to the emergency room for further evaluation.  He reported mild epigastric pain with no nausea and vomiting.  He underwent routine colonoscopy about 2 weeks ago and benign polyps were removed.  CT scan of the chest abdomen and pelvis done in the emergency room shows 1.2 cm linear radiodense object in the cecum which may represent foreign body.  Patient was discussed with the GI service on-call who advised admission to the hospital for colonoscopy for foreign body removal.      12/18  Patient is seen and examined this a.m.  Denies abdominal pain, nausea/vomiting.  Last bowel movement was yesterday which she reports was normal, no black or bloody stools.  Tolerating full regular diet.  GI has signed off.  Return precautions provided. Patient verbalizes understanding and agrees with the plan.             DISCHARGE DIAGNOSES / PLAN:   initial encounter  Active Problems:    * No active hospital problems. *        Greater than 31 minutes were spent with the patient on counseling and coordination of care    Signed:   Thuy Balderas PA-C  12/18/2024  10:03 AM       Statement Selected

## 2024-12-18 NOTE — DISCHARGE INSTRUCTIONS
Discharge Instructions       PATIENT ID: Logan Lenz  MRN: [id number]   YOB: 1955    DATE OF ADMISSION: 12/16/2024   DATE OF DISCHARGE: 12/18/2024    PRIMARY CARE PROVIDER: Yenny Galeano     ATTENDING PHYSICIAN: Jose Azevedo MD   DISCHARGING PROVIDER: Thuy Balderas PA-C    To contact this individual call [phone] and ask the  to page.   If unavailable ask to be transferred the Adult Hospitalist Department.    DISCHARGE DIAGNOSES     Swallowed foreign body vs displaced Endoscopy clip  - GI following, appreciate assistance  - tolerating regular diet  - MIVF: NS @ 125ml/hr  - KUB (12/17): An endoscopy clip in the cecum is new from 10/7/2024 , no FB seen, GI signed off 12/17  - f/u with GI outpatient      Dyslipidemia :   - Resume PTA statin once no longer NPO     Essential hypertension  - Continue home meds once off NPO     Benign prostatic hyperplasia    - Resume home medications once the patient is no longer n.p.o.     Paroxysmal atrial fibrillation  - Rate controlled  - Holding Apixaban in anticipation of colonoscopy     Hyperglycemia   - No Hx of DM, A1C: 5.4%     CONSULTATIONS: [unfilled]    PROCEDURES/SURGERIES: * No surgery found *    PENDING TEST RESULTS:   At the time of discharge the following test results are still pending: x    [facility]MENTS:   [facility]     ADDITIONAL CARE RECOMMENDATIONS:   F/u with GI  Return if you experience fever/chills, abdominal pain, nausea/vomiting, bloody or black stools    DIET: regular diet    ACTIVITY: activity as tolerated    WOUND CARE: x    EQUIPMENT needed: x      DISCHARGE MEDICATIONS:   See Medication Reconciliation Form    It is important that you take the medication exactly as they are prescribed.   Keep your medication in the bottles provided by the pharmacist and keep a list of the medication names, dosages, and times to be taken in your wallet.   Do not take other medications without consulting your doctor.

## 2024-12-31 NOTE — PROGRESS NOTES
Physician Progress Note      PATIENT:               RADHA DOSS  [facility] #:                  [id number]  :                       1955  ADMIT DATE:       2024 6:54 PM  DISCH DATE:        2024 12:43 PM  RESPONDING  PROVIDER #:        Thuy Balderas PA-C          QUERY TEXT:    Patient admitted with swallowed foreign body, noted to have paroxysmal atrial   fibrillation and is maintained on Eliquis. If possible, please document in   progress notes and discharge summary if you are evaluating and/or treating any   of the following:?  ?  The medical record reflects the following:  Risk Factors: 69 yea old male, Paf, anticoagulated, HTN    Clinical Indicators:  > Per H&P- Paroxysmal atrial fibrillation POA: Eliquis    Treatment: receiving Eliquis  Options provided:  -- Secondary hypercoagulable state in a patient with atrial fibrillation  -- Other - I will add my own diagnosis  -- Disagree - Not applicable / Not valid  -- Disagree - Clinically unable to determine / Unknown  -- Refer to Clinical Documentation Reviewer    PROVIDER RESPONSE TEXT:    This patient has secondary hypercoagulable state in a patient with atrial   fibrillation.    Query created by: Amanda Summers on 2024 11:06 AM      Electronically signed by:  Thuy Balderas PA-C 2024 9:15 AM

## 2025-01-22 RX ORDER — APIXABAN 5 MG/1
5 TABLET, FILM COATED ORAL 2 TIMES DAILY
Qty: 180 TABLET | Refills: 1 | Status: SHIPPED | OUTPATIENT
Start: 2025-01-22

## 2025-01-22 NOTE — TELEPHONE ENCOUNTER
Requested Prescriptions     Signed Prescriptions Disp Refills    apixaban (ELIQUIS) 5 MG TABS tablet 180 tablet 1     Sig: TAKE 1 TABLET TWICE A DAY     Authorizing Provider: LUIS SALDIVAR     Ordering User: BRAULIO STEWART MD    Future Appointments   Date Time Provider Department Center   3/19/2025 10:20 AM Luis Saldivar MD [facility]

## 2025-03-19 ENCOUNTER — OFFICE VISIT (OUTPATIENT)
Age: 70
End: 2025-03-19
Payer: MEDICARE

## 2025-03-19 VITALS
BODY MASS INDEX: 24.47 KG/M2 | SYSTOLIC BLOOD PRESSURE: 108 MMHG | WEIGHT: 174.8 LBS | DIASTOLIC BLOOD PRESSURE: 74 MMHG | HEART RATE: 60 BPM | HEIGHT: 71 IN | OXYGEN SATURATION: 99 %

## 2025-03-19 DIAGNOSIS — E78.2 MIXED HYPERLIPIDEMIA: ICD-10-CM

## 2025-03-19 DIAGNOSIS — I10 BENIGN ESSENTIAL HTN: ICD-10-CM

## 2025-03-19 DIAGNOSIS — I48.0 PAROXYSMAL ATRIAL FIBRILLATION (HCC): Primary | ICD-10-CM

## 2025-03-19 DIAGNOSIS — Z79.01 LONG TERM (CURRENT) USE OF ANTICOAGULANTS: ICD-10-CM

## 2025-03-19 PROCEDURE — 1159F MED LIST DOCD IN RCRD: CPT | Performed by: INTERNAL MEDICINE

## 2025-03-19 PROCEDURE — G8427 DOCREV CUR MEDS BY ELIG CLIN: HCPCS | Performed by: INTERNAL MEDICINE

## 2025-03-19 PROCEDURE — 99214 OFFICE O/P EST MOD 30 MIN: CPT | Performed by: INTERNAL MEDICINE

## 2025-03-19 PROCEDURE — G8420 CALC BMI NORM PARAMETERS: HCPCS | Performed by: INTERNAL MEDICINE

## 2025-03-19 PROCEDURE — 93005 ELECTROCARDIOGRAM TRACING: CPT | Performed by: INTERNAL MEDICINE

## 2025-03-19 PROCEDURE — 1123F ACP DISCUSS/DSCN MKR DOCD: CPT | Performed by: INTERNAL MEDICINE

## 2025-03-19 PROCEDURE — G2211 COMPLEX E/M VISIT ADD ON: HCPCS | Performed by: INTERNAL MEDICINE

## 2025-03-19 PROCEDURE — 93010 ELECTROCARDIOGRAM REPORT: CPT | Performed by: INTERNAL MEDICINE

## 2025-03-19 PROCEDURE — 1036F TOBACCO NON-USER: CPT | Performed by: INTERNAL MEDICINE

## 2025-03-19 PROCEDURE — 3078F DIAST BP <80 MM HG: CPT | Performed by: INTERNAL MEDICINE

## 2025-03-19 PROCEDURE — 3074F SYST BP LT 130 MM HG: CPT | Performed by: INTERNAL MEDICINE

## 2025-03-19 PROCEDURE — 3017F COLORECTAL CA SCREEN DOC REV: CPT | Performed by: INTERNAL MEDICINE

## 2025-03-19 PROCEDURE — 1126F AMNT PAIN NOTED NONE PRSNT: CPT | Performed by: INTERNAL MEDICINE

## 2025-03-19 RX ORDER — OLMESARTAN MEDOXOMIL 40 MG/1
40 TABLET ORAL DAILY
Qty: 30 TABLET | Refills: 0 | Status: SHIPPED | OUTPATIENT
Start: 2025-03-19

## 2025-03-19 RX ORDER — TRAZODONE HYDROCHLORIDE 50 MG/1
TABLET ORAL AS NEEDED
COMMUNITY
Start: 2025-01-14

## 2025-03-19 ASSESSMENT — PATIENT HEALTH QUESTIONNAIRE - PHQ9
SUM OF ALL RESPONSES TO PHQ QUESTIONS 1-9: 0
1. LITTLE INTEREST OR PLEASURE IN DOING THINGS: NOT AT ALL
SUM OF ALL RESPONSES TO PHQ QUESTIONS 1-9: 0
2. FEELING DOWN, DEPRESSED OR HOPELESS: NOT AT ALL

## 2025-03-19 NOTE — PROGRESS NOTES
[facility] [place]:   [phone]    History of Present Illness:  Mr. Lenz is a 68 yo M with hypertension, borderline hyperlipidemia seen in past for an abnormal EKG with Q waves in V1 and V2 with question of old anteroseptal infarct. Echo 2015 was normal with no infarct.      Since his last visit, overall he has been doing okay cardiac-wise.  Back in December, he swallowed a piece of glass, but ended up passing okay.  Interestingly, he just had a colonoscopy a week or two prior.  Back in February, he had a stomach flu for a few days and his heart rate jumped higher in the 80s to 90s and he took an extra pill.  Otherwise, denies any exertional chest pain or shortness of breath.  He did have one day in January where he had been exercising more and had some positional left shoulder pain and discomfort.  This was not recurrent and he continues to stay active.  He is compensated on exam with clear lungs and no lower extremity edema.  Soc hx.  No tobacco.  Fam hx.  No early CAD.  Assessment and Plan:   1. Paroxysmal AFib, PVCs.  Stable in sinus rhythm.  Did have him see Dr. Lomeli in the past with consideration of ablation, but he has done well with medical management.  Will have him follow back in 6 months.  2. Chronic anticoagulation.  No bleeding issues on Eliquis.  He has a brother who sounds like he had a WATCHMAN and we had some discussion about this, but not indicated at this time.  3. Essential hypertension.  Blood pressure is controlled.  4. Mixed hyperlipidemia.  5. Colon polyps.  Followed by GI, Dr. Braun and he gets colonoscopy every 3 years.  6. Lumbar back surgery.  Followed by Ortho, Dr. Hernandez in 2022.          Logan  has a past medical history of Acoustic neuroma (HCC), Arthritis, Diverticulosis, Encounter for screening colonoscopy, HTN, goal below 150/90, Hypertrophy of prostate without urinary obstruction and other lower urinary tract symptoms (LUTS), IBS (irritable bowel syndrome), Lumbar

## 2025-03-19 NOTE — PROGRESS NOTES
1. Have you been to the ER, urgent care clinic since your last visit?  Hospitalized since your last visit?  [facility], 12/16/24, Swallowed foreign body    2. Have you seen or consulted any other health care providers outside of the [facility] since your last visit?  Include any pap smears or colon screening.   [facility]

## 2025-04-28 RX ORDER — METOPROLOL SUCCINATE 25 MG/1
25 TABLET, EXTENDED RELEASE ORAL DAILY
Qty: 90 TABLET | Refills: 3 | Status: SHIPPED | OUTPATIENT
Start: 2025-04-28

## 2025-04-28 NOTE — TELEPHONE ENCOUNTER
Requested Prescriptions     Signed Prescriptions Disp Refills    metoprolol succinate (TOPROL XL) 25 MG extended release tablet 90 tablet 3     Sig: TAKE 1 TABLET DAILY     Authorizing Provider: LUIS SALDIVAR     Ordering User: BRAULIO STEWART MD    Future Appointments   Date Time Provider Department Center   9/16/2025  9:40 AM Luis Saldivar MD [facility]

## 2025-04-30 RX ORDER — OLMESARTAN MEDOXOMIL 40 MG/1
40 TABLET ORAL DAILY
Qty: 90 TABLET | Refills: 3 | Status: SHIPPED | OUTPATIENT
Start: 2025-04-30

## 2025-04-30 NOTE — TELEPHONE ENCOUNTER
Requested Prescriptions     Signed Prescriptions Disp Refills    olmesartan (BENICAR) 40 MG tablet 90 tablet 3     Sig: TAKE 1 TABLET DAILY     Authorizing Provider: LUIS SALDIVAR     Ordering User: BRAULIO STEWART MD    Future Appointments   Date Time Provider Department Center   9/16/2025  9:40 AM Luis Saldivar MD [facility]

## (undated) DEVICE — PAD,ABDOMINAL,5"X9",ST,LF,25/BX: Brand: MEDLINE INDUSTRIES, INC.

## (undated) DEVICE — APPLICATOR PREP 26ML 0.7% IOD POVACRYLEX 74% ISO ALC ST

## (undated) DEVICE — NEEDLE SCLERO 23GA L4MM CATH L240CM CNTRST SHTH DIA1.8MM

## (undated) DEVICE — SUPPLEMENT DIGESTIVE H2O SOL GI-EASE

## (undated) DEVICE — TRAP SURG QUAD PARABOLA SLOT DSGN SFTY SCRN TRAPEASE

## (undated) DEVICE — SNARE POLYP SM AD W13MMXL240CM SHTH DIA2.4MM HEX STIFF

## (undated) DEVICE — SUTURE STRATAFIX SPRL MCRYL + SZ 3 0 L27IN ABSRB UD SH L26MM SXMP1B428

## (undated) DEVICE — ADHESIVE LIQ 2OZ ADJUNCT FOR DSG MASTISOL

## (undated) DEVICE — GLOVE SURG SZ 8 L12IN FNGR THK79MIL GRN LTX FREE

## (undated) DEVICE — STRIP,CLOSURE,WOUND,MEDI-STRIP,1/2X4: Brand: MEDLINE

## (undated) DEVICE — BONE WAX WHITE: Brand: BONE WAX WHITE

## (undated) DEVICE — SUTURE STRATAFIX SYMMETRIC SZ 1 L18IN ABSRB VLT CT1 L36CM SXPP1A404

## (undated) DEVICE — WORKING LENGTH 155CM, WORKING CHANNEL 2.8MM: Brand: RESOLUTION 360 CLIP

## (undated) DEVICE — SOLUTION IV 250ML 0.9% SOD CHL CLR INJ FLX BG CONT PRT CLSR

## (undated) DEVICE — SUTURE ABS MF 2-0 CT1 27IN STRATAFIX PDS+ SXPP1B412

## (undated) DEVICE — POSITIONER HD REST FOAM CMFRT TCH

## (undated) DEVICE — REM POLYHESIVE ADULT PATIENT RETURN ELECTRODE: Brand: VALLEYLAB

## (undated) DEVICE — SUTURE NONABSORBABLE MONOFILAMENT 5-0 CV-6 TTC-9 24 IN GORTX 6K02A

## (undated) DEVICE — TUBING HYDR IRR --

## (undated) DEVICE — FORCEPS BX L240CM JAW DIA2.8MM L CAP W/ NDL MIC MESH TOOTH

## (undated) DEVICE — SINGLE-USE POLYPECTOMY SNARE: Brand: CAPTIVATOR

## (undated) DEVICE — GLOVE SURG SZ 75 L12IN FNGR THK94MIL STD WHT LTX FREE

## (undated) DEVICE — SUTURE ABS ANTIBACT 1-0 CTX 24IN STRATAFIX PDS+ SXPP1A445

## (undated) DEVICE — C-ARM: Brand: UNBRANDED

## (undated) DEVICE — COVER,MAYO STAND,STERILE: Brand: MEDLINE

## (undated) DEVICE — 4-PORT MANIFOLD: Brand: NEPTUNE 2

## (undated) DEVICE — FLOSEAL HEMOSTATIC MATRIX, 10ML: Brand: FLOSEAL HEMOSTATIC MATRIX

## (undated) DEVICE — SUTURE ABSRB L30CM 2-0 VLT SPRL PDS + STRATAFIX SXPP1B410

## (undated) DEVICE — SNARE ENDOSCP M L240CM W27MM SHTH DIA2.4MM CHN 2.8MM OVL

## (undated) DEVICE — CLIP HEMO ENDOSCP 235CM BX/20 -- RESOLUTION 360

## (undated) DEVICE — BIPOLAR IRRIGATOR INTEGRATED TUBING AND BIPOLAR CORD SET, DISPOSABLE

## (undated) DEVICE — LAMINECTOMY-SMH: Brand: MEDLINE INDUSTRIES, INC.

## (undated) DEVICE — GEL SUBMUCOSAL LIFT AGNT -- ORISE

## (undated) DEVICE — SUTURE VCRL 2-0 L27IN ABSRB UD CP-2 L26MM 1/2 CIR REV CUT J869H

## (undated) DEVICE — RETRIEVAL DEVICE: Brand: RESCUENET™

## (undated) DEVICE — AGENT LIFTING 10 CC SUBMUCOSAL INJ SOLUTION STRL BLUEBOOST

## (undated) DEVICE — SUT STRATA PGA 3-0 PS-2 60CM -- STRATAFIX PGA PCL